# Patient Record
Sex: FEMALE | Race: WHITE | NOT HISPANIC OR LATINO | Employment: OTHER | URBAN - NONMETROPOLITAN AREA
[De-identification: names, ages, dates, MRNs, and addresses within clinical notes are randomized per-mention and may not be internally consistent; named-entity substitution may affect disease eponyms.]

---

## 2021-04-08 DIAGNOSIS — Z23 ENCOUNTER FOR IMMUNIZATION: ICD-10-CM

## 2021-08-20 ENCOUNTER — TELEPHONE (OUTPATIENT)
Dept: PAIN MEDICINE | Facility: CLINIC | Age: 71
End: 2021-08-20

## 2021-08-20 ENCOUNTER — CONSULT (OUTPATIENT)
Dept: PAIN MEDICINE | Facility: CLINIC | Age: 71
End: 2021-08-20
Payer: MEDICARE

## 2021-08-20 VITALS
HEART RATE: 89 BPM | SYSTOLIC BLOOD PRESSURE: 140 MMHG | RESPIRATION RATE: 20 BRPM | DIASTOLIC BLOOD PRESSURE: 72 MMHG | HEIGHT: 66 IN | TEMPERATURE: 97.8 F | BODY MASS INDEX: 33.91 KG/M2 | WEIGHT: 211 LBS

## 2021-08-20 DIAGNOSIS — M47.816 LUMBAR FACET ARTHROPATHY: Primary | ICD-10-CM

## 2021-08-20 PROCEDURE — 99204 OFFICE O/P NEW MOD 45 MIN: CPT | Performed by: ANESTHESIOLOGY

## 2021-08-20 RX ORDER — AMLODIPINE BESYLATE 5 MG/1
5 TABLET ORAL DAILY
COMMUNITY
Start: 2021-06-18

## 2021-08-20 RX ORDER — METHYLPREDNISOLONE ACETATE 80 MG/ML
80 INJECTION, SUSPENSION INTRA-ARTICULAR; INTRALESIONAL; INTRAMUSCULAR; PARENTERAL; SOFT TISSUE ONCE
Status: CANCELLED | OUTPATIENT
Start: 2021-08-20 | End: 2021-08-20

## 2021-08-20 RX ORDER — CLINDAMYCIN HYDROCHLORIDE 150 MG/1
CAPSULE ORAL
COMMUNITY
Start: 2021-06-21 | End: 2021-09-15

## 2021-08-20 RX ORDER — LIDOCAINE HYDROCHLORIDE 10 MG/ML
10 INJECTION, SOLUTION EPIDURAL; INFILTRATION; INTRACAUDAL; PERINEURAL ONCE
Status: CANCELLED | OUTPATIENT
Start: 2021-08-20 | End: 2021-08-20

## 2021-08-20 RX ORDER — BUPIVACAINE HCL/PF 2.5 MG/ML
10 VIAL (ML) INJECTION ONCE
Status: CANCELLED | OUTPATIENT
Start: 2021-08-20 | End: 2021-08-20

## 2021-08-20 RX ORDER — ASPIRIN 81 MG/1
81 TABLET ORAL 2 TIMES DAILY
COMMUNITY
Start: 2021-06-01 | End: 2022-06-01

## 2021-08-20 NOTE — H&P (VIEW-ONLY)
Assessment  1  Lumbar facet arthropathy  -     Case request operating room: BLOCK MEDIAL BRANCH L3, L4, L5; Standing  -     Case request operating room: BLOCK MEDIAL BRANCH L3, L4, L5    Right low axial back pain described by arthritic/ axial features  Denies any radicular component to the pain apart from some mild numbness in right L5 dermatomal distribution that is very intermittent in nature  She has positive facet loading maneuvers, right greater than left  Negative SLR bilaterally; 5/5 strength in all extremities with active range of motion movements bilaterally  Facet arthropathy that is prominent right greater than left at L3 -4 L4-5, L5-S1 facet joints,  Causing in combination with right L4-5 and L5-S1 transforaminal stenosis secondary to disc bulge, severe stenosis with exiting nerve roots at these levels  Previously underwent facet joint injections and epidural steroid injections that significant relief more than 1 year ago  Reasonable at this time to proceed with multimodal pain therapy plan as noted to treat patient's arthritic low back pain  Plan  -Right L3, L4, L5 medial branch blocks #1  -may continue advil sparingly  Patient educated regarding bleeding risk of taking this medication not taking any other nonsteroidal anti-inflammatory medications while taking this medication; counseled thoroughly regarding potential risk of Cardiovascular injury, Kidney injury, Gastrointestinal ulceration/bleeding  Patient voiced understanding  -physical therapy for right-sided lumbar facet arthropathy; Core exercises additionally provided for physician directed home PT that the patient plans to participate with for 1 hour, twice a week for the next 6 weeks  There are risks associated with opioid medications, including dependence, addiction and tolerance  The patient understands and agrees to use these medications only as prescribed   Potential side effects of the medications include, but are not limited to, constipation, drowsiness, addiction, impaired judgment and risk of fatal overdose if not taken as prescribed  The patient was warned against driving while taking sedation medications  Sharing medications is a felony  At this point in time, the patient is showing no signs of addiction, abuse, diversion or suicidal ideation  [place] Prescription Drug Monitoring Program report was reviewed and was appropriate     Complete risks and benefits including bleeding, infection, tissue reaction, nerve injury and allergic reaction were discussed  The approach was demonstrated using models and literature was provided  Verbal and written consent was obtained  My impressions and treatment recommendations were discussed in detail with the patient who verbalized understanding and had no further questions  Discharge instructions were provided  I personally saw and examined the patient and I agree with the above discussed plan of care  New Medications Ordered This Visit   Medications    amLODIPine (NORVASC) 5 mg tablet     Sig: Take 5 mg by mouth daily    aspirin (ECOTRIN LOW STRENGTH) 81 mg EC tablet     Sig: Take 81 mg by mouth 2 (two) times a day    clindamycin (CLEOCIN) 150 mg capsule     Sig: take 4 capsules by mouth 1 HOUR BEFORE DENTIST       History of Present Illness    A Nemo Duenas is a 80 y o  female with previous gastric bypass surgery, obesity presenting with a past medical history of chronic low back pain described primarily as arthritic in nature  She describes 8/10 low back pain that is worse in the mornings and worse at the end of the day  The pain is characterized by achy, nagging, indolent, crampy, stabbing pain in her axial low back  The patient describes that the pain is worse with standing for long periods of time on hard surfaces as well as with walking    The patient is a very active individual and feels as though this pain compromisesher participation with independent activities of daily living  The pain can be debilitating at times and contribute to significant disability, compromising overall activity and independent activities of daily living  She has tried physical therapy with limited relief of symptoms in the past   Medications the patient has tried in the past include tylenol, meloxicam, celebrex, alleve  She describes no radicular symptoms other than some intermittent numbness in right L5 dermatome and otherwise has good strength  The patient denies any bowel or bladder dysfunction as well  I have personally reviewed and/or updated the patient's past medical history, past surgical history, family history, social history, current medications, allergies, and vital signs today  Review of Systems   Constitutional: Positive for activity change  HENT: Negative  Eyes: Negative  Respiratory: Negative  Cardiovascular: Negative  Gastrointestinal: Negative  Endocrine: Negative  Genitourinary: Negative  Musculoskeletal: Positive for arthralgias, back pain, gait problem and myalgias  Skin: Negative  Allergic/Immunologic: Negative  Neurological: Negative for weakness and numbness  Hematological: Negative  Psychiatric/Behavioral: Negative  All other systems reviewed and are negative  There is no problem list on file for this patient  History reviewed  No pertinent past medical history  History reviewed  No pertinent surgical history      Family History   Problem Relation Age of Onset    No Known Problems Mother     No Known Problems Father        Social History     Occupational History    Not on file   Tobacco Use    Smoking status: Never Smoker    Smokeless tobacco: Never Used   Substance and Sexual Activity    Alcohol use: Not on file    Drug use: Not on file    Sexual activity: Not on file       Current Outpatient Medications on File Prior to Visit   Medication Sig    amLODIPine (NORVASC) 5 mg tablet Take 5 mg by mouth daily    aspirin (ECOTRIN LOW STRENGTH) 81 mg EC tablet Take 81 mg by mouth 2 (two) times a day    clindamycin (CLEOCIN) 150 mg capsule take 4 capsules by mouth 1 HOUR BEFORE DENTIST     No current facility-administered medications on file prior to visit  Allergies   Allergen Reactions    Amoxicillin Abdominal Pain and Other (See Comments)    Azithromycin Abdominal Pain and Other (See Comments)         Physical Exam    /72   Pulse 89   Temp 97 8 °F (36 6 °C)   Resp 20   Ht 5' 6 25" (1 683 m)   Wt 95 7 kg (211 lb)   BMI 33 80 kg/m²     Constitutional: normal, well developed, well nourished, alert, in no distress and non-toxic and no overt pain behavior  and obese  Eyes: anicteric  HEENT: grossly intact  Neck: supple, symmetric, trachea midline and no masses   Pulmonary:even and unlabored  Cardiovascular:No edema or pitting edema present  Skin:Normal without rashes or lesions and well hydrated  Psychiatric:Mood and affect appropriate  Neurologic:Cranial Nerves II-XII grossly intact Sensation grossly intact; no clonus negative sanchez's  Reflexes 2+ and brisk  SLR negative bilaterally  Musculoskeletal:normal gait  5/5 strength in all extremities with active range of motion movements bilaterally  Normal heel toe and tip toe walking  pain with lumbar facet loading bilaterally and with lateral spine rotation  ttp over lumbar paraspinal muscles  Negative toby's test, negative gaenslen's negative SIJ loading bilaterally  Imaging      MRI lumbar spine shows no central canal stenosis; however, right L4-5 and L5-S1 transforaminal stenosis secondary to disc herniations  Additionally there is prominent right greater than left facet arthropathy that is moderate to severe throughout axial lumbar spine

## 2021-08-20 NOTE — TELEPHONE ENCOUNTER
In room with [facility] NP for vaginal exam.     Debo Snider RN  01/31/20 9043 S/w pt and scheduled her procedure with Dr Uriel Benz  Went over pre procedure instructions as well  No eating/drinking 1 hour prior  Pt aware she will be called the day before with a time  Appropriate clothing   required  Take medications as usual - hold prn pain meds 6 hrs before  If you get sick or abx, call  Pt verbalized understanding

## 2021-08-20 NOTE — PATIENT INSTRUCTIONS
Core Strengthening Exercises   WHAT YOU NEED TO KNOW:   Your core includes the muscles of your lower back, hip, pelvis, and abdomen  Core strengthening exercises help heal and strengthen these muscles  This helps prevent another injury, and keeps your pelvis, spine, and hips in the correct position  DISCHARGE INSTRUCTIONS:   Contact your healthcare provider if:   · You have sharp or worsening pain during exercise or at rest     · You have questions or concerns about your shoulder exercises  Safety tips:  Talk to your healthcare provider before you start an exercise program  A physical therapist can teach you how to do core strengthening exercises safely  · Do the exercises on a mat or firm surface  A firm surface will support your spine and prevent low back pain  Do not do these exercises on a bed  · Move slowly and smoothly  Avoid fast or jerky motions  · Stop if you feel pain  Core exercises should not be painful  Stop if you feel pain  · Breathe normally during core exercises  Do not hold your breath  This may cause an increase in blood pressure and prevent muscle strengthening  Your healthcare provider will tell you when to inhale and exhale during the exercise  · Begin all of your exercises with abdominal bracing  Abdominal bracing helps warm up your core muscles  You can also practice abdominal bracing throughout the day  Lie on your back with your knees bent and feet flat on the floor  Place your arms in a relaxed position beside your body  Tighten your abdominal muscles  Pull your belly button in and up toward your spine  Hold for 5 seconds  Relax your muscles  Repeat 10 times  Core strengthening exercises: Your healthcare provider will tell you how often to do these exercises  The provider will also tell you how many repetitions of each exercise you should do  Hold each exercise for 5 seconds or as directed  As you get stronger, increase your hold to 10 to 15 seconds   You can do some of these exercises on a stability ball, or with a weight  Ask your healthcare provider how to use a stability ball or weight for these exercises:  · Bridging:  Lie on your back with your knees bent and feet flat on the floor  Rest your arms at your side  Tighten your buttocks, and then lift your hips 1 inch off the floor  Hold for 5 seconds  When you can do this exercise without pain for 10 seconds, increase the distance you lift your hips  A good goal is to be able to lift your hips so that your shoulders, hips, and knees are in a straight line  · Dead bug:  Lie on your back with your knees bent and feet flat on the floor  Place your arms in a relaxed position beside your body  Begin with abdominal bracing  Next, raise one leg, keeping your knee bent  Hold for 5 seconds  Repeat with the other leg  When you can do this exercise without pain for 10 to 15 seconds, you may raise one straight leg and hold  Repeat with the other leg  · Quadruped:  Place your hands and knees on the floor  Keep your wrists directly below your shoulders and your knees directly below your hips  Pull your belly button in toward your spine  Do not flatten or arch your back  Tighten your abdominal muscles below your belly button  Hold for 5 seconds  When you can do this exercise without pain for 10 to 15 seconds, you may extend one arm and hold  Repeat on the other side  · Side bridge exercises:      ? Standing side bridge:  Stand next to a wall and extend one arm toward the wall  Place your palm flat on the wall with your fingers pointing upward  Begin with abdominal bracing  Next, without moving your feet, slowly bend your arm to 90 degrees  Hold for 5 seconds  Repeat on the other side  When you can do this exercise without pain for 10 to 15 seconds, you may do the bent leg side bridge on the floor  ? Bent leg side bridge:  Lie on one side with your legs, hips, and shoulders in a straight line   Prop yourself up onto your forearm so your elbow is directly below your shoulder  Bend your knees back to 90 degrees  Begin with abdominal bracing  Next, lift your hips and balance yourself on your forearm and knees  Hold for 5 seconds  Repeat on the other side  When you can do this exercise without pain for 10 to 15 seconds, you may do the straight leg side bridge on the floor  ? Straight leg side bridge:  Lie on one side with your legs, hips, and shoulders in a straight line  Prop yourself up onto your forearm so your elbow is directly below your shoulder  Begin with abdominal bracing  Lift your hips off the floor and balance yourself on your forearm and the outside of your flexed foot  Do not let your ankle bend sideways  Hold for 5 seconds  Repeat on the other side  When you can do this exercise without pain for 10 to 15 seconds, ask your healthcare provider for more advanced exercises  · Superman:  Lie on your stomach  Extend your arms forward on the floor  Tighten your abdominal muscles and lift your right hand and left leg off the floor  Hold this position  Slowly return to the starting position  Tighten your abdominal muscles and lift your left hand and right leg off the floor  Hold this position  Slowly return to the starting position  · Clam:  Lie on your side with your knees bent  Put your bottom arm under your head to keep your neck in line  Put your top hand on your hip to keep your pelvis from moving  Put your heels together, and keep them together during this exercise  Slowly raise your top knee toward the ceiling  Then lower your leg so your knees are together  Repeat this exercise 10 times  Then switch sides and do the exercise 10 times with the other leg  · Curl up:  Lie on your back with your knees bent and feet flat on the floor  Place your hands, palms down, underneath your lower back   Next, with your elbows on the floor, lift your shoulders and chest 2 to 3 inches off the floor  Keep your head in line with your shoulders  Hold this position  Slowly return to the starting position  · Straight leg raises:  Lie on your back with one leg straight  Bend the other knee and place your foot flat on the floor  Tighten your abdominal muscles  Keep your leg straight and slowly lift it straight up 6 to 12 inches off the floor  Hold this position  Lower your leg slowly  Do as many repetitions as directed on this side  Repeat with the other leg  · Plank:  Lie on your stomach  Bend your elbows and place your forearms flat on the floor  Lift your chest, stomach, and knees off the floor  Make sure your elbows are below your shoulders  Your body should be in a straight line  Do not let your hips or lower back sink to the ground  Squeeze your abdominal muscles together and hold for 15 seconds  To make this exercise harder, hold for 30 seconds or lift 1 leg at a time  · Bicycles:  Lie on your back  Bend both knees and bring them toward your chest  Your calves should be parallel to the floor  Place the palms of your hands on the back of your head  Straighten your right leg and keep it lifted 2 inches off the floor  Raise your head and shoulders off the floor and twist towards your left  Keep your head and shoulders lifted  Bend your right knee while you straighten your left leg  Keep your left leg 2 inches off the floor  Twist your head and chest towards the left leg  Continue to straighten 1 leg at a time and twist        Follow up with your healthcare provider as directed:  Write down your questions so you remember to ask them during your visits  © Copyright [employer] 2021 Information is for End User's use only and may not be sold, redistributed or otherwise used for commercial purposes  All illustrations and images included in CareNotes® are the copyrighted property of A D A M , Inc  or [facility]   The above information is an  only   It is not intended as medical advice for individual conditions or treatments  Talk to your doctor, nurse or pharmacist before following any medical regimen to see if it is safe and effective for you  Lumbar Facet Block   WHAT YOU NEED TO KNOW:   A lumbar facet block is a procedure used to decrease inflammation in your lower spine  Medicines are injected at facet joints in your lower back  Facet joints are found at the back of each vertebrae  HOW TO PREPARE:   The week before your procedure:   · Your healthcare provider will talk to you about how to prepare for your procedure  Arrange to have someone drive you home after the procedure  · Tell your provider about all the medicines you currently take  He or she will tell you if you need to stop any medicine before the procedure, and when to stop  He or she will tell you what medicines to take or not take on the day of your procedure  · You may need blood or urine tests before your procedure  You may also need x-rays, a CT scan, or an MRI  Tell your healthcare provider if you have ever had an allergic reaction to contrast liquid  Do not enter the MRI room with anything metal  Metal can cause serious damage  Tell the provider if you have any metal in or on your body  The night before your procedure: You may be told not to eat or drink anything after midnight  The day of your procedure:   · Take only the medicines your healthcare provider told you to take  · You or a close family member will be asked to sign a legal document called a consent form  It gives healthcare providers permission to do the procedure or surgery  It also explains the problems that may happen, and your choices  Make sure all your questions are answered before you sign this form  · Healthcare providers may insert an intravenous tube (IV) into your vein  A vein in the arm is usually chosen  Through the IV tube, you may be given liquids and medicine      · An anesthesiologist will talk to you before your surgery  You may need medicine to keep you asleep or numb an area of your body during surgery  Tell healthcare providers if you or anyone in your family has had a problem with anesthesia in the past     WHAT WILL HAPPEN:   What will happen:   · You will lie on your stomach, with your body slightly turned to the side  A pillow may be placed under your abdomen, or you may be asked to bend one or both knees  · A needle will be inserted into the facet joint in your lower back  Your surgeon may use contrast liquid with an x-ray or CT to help guide the needle  He or she will inject medicines, such as steroids, to decrease inflammation  After your procedure: You will be taken to a room to rest until you are fully awake  You will be monitored closely for any problems  Do not get out of bed until your healthcare provider says it is okay  Your provider may have you move the area to see if you still have pain  You may then be able to go home  CONTACT YOUR HEALTHCARE PROVIDER IF:   · You have a fever, a cold, or the flu  · You have questions or concerns about your procedure  RISKS:   You may bleed more than expected or get an infection  Nerves, blood vessels, or muscles may be damaged  You may have numbness in other areas  You may still have lower back or leg pain  CARE AGREEMENT:   You have the right to help plan your care  Learn about your health condition and how it may be treated  Discuss treatment options with your healthcare providers to decide what care you want to receive  You always have the right to refuse treatment  © Copyright [employer] 2021 Information is for End User's use only and may not be sold, redistributed or otherwise used for commercial purposes  All illustrations and images included in CareNotes® are the copyrighted property of [employer]  or Martín Perdue  The above information is an  only   It is not intended as medical advice for individual conditions or treatments  Talk to your doctor, nurse or pharmacist before following any medical regimen to see if it is safe and effective for you  Lumbar Radiofrequency Ablation   WHAT YOU NEED TO KNOW:   What do I need to know about lumbar radiofrequency ablation? Lumbar radiofrequency ablation (RFA) is a procedure used to treat facet joint pain in your lower back  Facet joints are found at the back of each vertebra  A needle electrode is used to send electrical currents to the nerves in your facet joint  The electrical currents create heat that damages the nerve so it cannot send pain signals  How do I prepare for lumbar RFA? Your healthcare provider will talk to you about how to prepare for this procedure  He may tell you not to eat or drink anything after midnight on the day of your procedure  He will tell you what medicines to take or not take on the day of your procedure  What will happen during lumbar RFA? · You will lie on your stomach  You will be given local anesthesia to numb the area of your back where the needle electrode will be inserted  You may be given a sedative to help keep you relaxed  You may still feel pressure or pushing during the procedure, but you should not feel any pain  Your healthcare provider will use fluoroscopy (a type of x-ray) to guide the needle electrode to the nerves near your facet joint  · Your healthcare provider may touch the affected nerve to make sure the needle electrode is in the right place  You will feel tingling or pressure when he does this  He will then apply local anesthesia to the nerve to numb it  This will prevent you from feeling pain when he applies heat to the nerve  Your healthcare provider will then apply heat to the nerve using the needle electrode  He may need to apply heat to more than one nerve  He will remove the needle electrode and apply a bandage over the area  What are the risks of lumbar RFA?   You may have pain, numbness, tingling, or burning in the area where the lumbar RFA was done  These normally go away within 6 weeks  The needle electrode may injure your spinal nerves  This may cause permanent leg weakness or nerve pain  CARE AGREEMENT:   You have the right to help plan your care  Learn about your health condition and how it may be treated  Discuss treatment options with your healthcare providers to decide what care you want to receive  You always have the right to refuse treatment  The above information is an  only  It is not intended as medical advice for individual conditions or treatments  Talk to your doctor, nurse or pharmacist before following any medical regimen to see if it is safe and effective for you  © Copyright [employer] 2021 Information is for End User's use only and may not be sold, redistributed or otherwise used for commercial purposes   All illustrations and images included in CareNotes® are the copyrighted property of [employer]  or [address] [place] [address] [employer]

## 2021-08-20 NOTE — PROGRESS NOTES
Assessment  1  Lumbar facet arthropathy  -     Case request operating room: BLOCK MEDIAL BRANCH L3, L4, L5; Standing  -     Case request operating room: BLOCK MEDIAL BRANCH L3, L4, L5    Right low axial back pain described by arthritic/ axial features  Denies any radicular component to the pain apart from some mild numbness in right L5 dermatomal distribution that is very intermittent in nature  She has positive facet loading maneuvers, right greater than left  Negative SLR bilaterally; 5/5 strength in all extremities with active range of motion movements bilaterally  Facet arthropathy that is prominent right greater than left at L3 -4 L4-5, L5-S1 facet joints,  Causing in combination with right L4-5 and L5-S1 transforaminal stenosis secondary to disc bulge, severe stenosis with exiting nerve roots at these levels  Previously underwent facet joint injections and epidural steroid injections that significant relief more than 1 year ago  Reasonable at this time to proceed with multimodal pain therapy plan as noted to treat patient's arthritic low back pain  Plan  -Right L3, L4, L5 medial branch blocks #1  -may continue advil sparingly  Patient educated regarding bleeding risk of taking this medication not taking any other nonsteroidal anti-inflammatory medications while taking this medication; counseled thoroughly regarding potential risk of Cardiovascular injury, Kidney injury, Gastrointestinal ulceration/bleeding  Patient voiced understanding  -physical therapy for right-sided lumbar facet arthropathy; Core exercises additionally provided for physician directed home PT that the patient plans to participate with for 1 hour, twice a week for the next 6 weeks  There are risks associated with opioid medications, including dependence, addiction and tolerance  The patient understands and agrees to use these medications only as prescribed   Potential side effects of the medications include, but are not limited to, constipation, drowsiness, addiction, impaired judgment and risk of fatal overdose if not taken as prescribed  The patient was warned against driving while taking sedation medications  Sharing medications is a felony  At this point in time, the patient is showing no signs of addiction, abuse, diversion or suicidal ideation  [place] Prescription Drug Monitoring Program report was reviewed and was appropriate     Complete risks and benefits including bleeding, infection, tissue reaction, nerve injury and allergic reaction were discussed  The approach was demonstrated using models and literature was provided  Verbal and written consent was obtained  My impressions and treatment recommendations were discussed in detail with the patient who verbalized understanding and had no further questions  Discharge instructions were provided  I personally saw and examined the patient and I agree with the above discussed plan of care  New Medications Ordered This Visit   Medications    amLODIPine (NORVASC) 5 mg tablet     Sig: Take 5 mg by mouth daily    aspirin (ECOTRIN LOW STRENGTH) 81 mg EC tablet     Sig: Take 81 mg by mouth 2 (two) times a day    clindamycin (CLEOCIN) 150 mg capsule     Sig: take 4 capsules by mouth 1 HOUR BEFORE DENTIST       History of Present Illness    A Wendy Apley is a 80 y o  female with previous gastric bypass surgery, obesity presenting with a past medical history of chronic low back pain described primarily as arthritic in nature  She describes 8/10 low back pain that is worse in the mornings and worse at the end of the day  The pain is characterized by achy, nagging, indolent, crampy, stabbing pain in her axial low back  The patient describes that the pain is worse with standing for long periods of time on hard surfaces as well as with walking    The patient is a very active individual and feels as though this pain compromisesher participation with independent activities of daily living  The pain can be debilitating at times and contribute to significant disability, compromising overall activity and independent activities of daily living  She has tried physical therapy with limited relief of symptoms in the past   Medications the patient has tried in the past include tylenol, meloxicam, celebrex, alleve  She describes no radicular symptoms other than some intermittent numbness in right L5 dermatome and otherwise has good strength  The patient denies any bowel or bladder dysfunction as well  I have personally reviewed and/or updated the patient's past medical history, past surgical history, family history, social history, current medications, allergies, and vital signs today  Review of Systems   Constitutional: Positive for activity change  HENT: Negative  Eyes: Negative  Respiratory: Negative  Cardiovascular: Negative  Gastrointestinal: Negative  Endocrine: Negative  Genitourinary: Negative  Musculoskeletal: Positive for arthralgias, back pain, gait problem and myalgias  Skin: Negative  Allergic/Immunologic: Negative  Neurological: Negative for weakness and numbness  Hematological: Negative  Psychiatric/Behavioral: Negative  All other systems reviewed and are negative  There is no problem list on file for this patient  History reviewed  No pertinent past medical history  History reviewed  No pertinent surgical history      Family History   Problem Relation Age of Onset    No Known Problems Mother     No Known Problems Father        Social History     Occupational History    Not on file   Tobacco Use    Smoking status: Never Smoker    Smokeless tobacco: Never Used   Substance and Sexual Activity    Alcohol use: Not on file    Drug use: Not on file    Sexual activity: Not on file       Current Outpatient Medications on File Prior to Visit   Medication Sig    amLODIPine (NORVASC) 5 mg tablet Take 5 mg by mouth daily    aspirin (ECOTRIN LOW STRENGTH) 81 mg EC tablet Take 81 mg by mouth 2 (two) times a day    clindamycin (CLEOCIN) 150 mg capsule take 4 capsules by mouth 1 HOUR BEFORE DENTIST     No current facility-administered medications on file prior to visit  Allergies   Allergen Reactions    Amoxicillin Abdominal Pain and Other (See Comments)    Azithromycin Abdominal Pain and Other (See Comments)         Physical Exam    /72   Pulse 89   Temp 97 8 °F (36 6 °C)   Resp 20   Ht 5' 6 25" (1 683 m)   Wt 95 7 kg (211 lb)   BMI 33 80 kg/m²     Constitutional: normal, well developed, well nourished, alert, in no distress and non-toxic and no overt pain behavior  and obese  Eyes: anicteric  HEENT: grossly intact  Neck: supple, symmetric, trachea midline and no masses   Pulmonary:even and unlabored  Cardiovascular:No edema or pitting edema present  Skin:Normal without rashes or lesions and well hydrated  Psychiatric:Mood and affect appropriate  Neurologic:Cranial Nerves II-XII grossly intact Sensation grossly intact; no clonus negative sanchez's  Reflexes 2+ and brisk  SLR negative bilaterally  Musculoskeletal:normal gait  5/5 strength in all extremities with active range of motion movements bilaterally  Normal heel toe and tip toe walking  pain with lumbar facet loading bilaterally and with lateral spine rotation  ttp over lumbar paraspinal muscles  Negative toby's test, negative gaenslen's negative SIJ loading bilaterally  Imaging      MRI lumbar spine shows no central canal stenosis; however, right L4-5 and L5-S1 transforaminal stenosis secondary to disc herniations  Additionally there is prominent right greater than left facet arthropathy that is moderate to severe throughout axial lumbar spine

## 2021-08-24 ENCOUNTER — TELEPHONE (OUTPATIENT)
Dept: PAIN MEDICINE | Facility: CLINIC | Age: 71
End: 2021-08-24

## 2021-08-24 RX ORDER — LEVOTHYROXINE SODIUM 0.1 MG/1
100 TABLET ORAL DAILY
COMMUNITY

## 2021-08-24 RX ORDER — PRAVASTATIN SODIUM 20 MG
20 TABLET ORAL DAILY
COMMUNITY

## 2021-08-24 RX ORDER — MULTIVITAMIN
1 CAPSULE ORAL DAILY
COMMUNITY

## 2021-08-24 RX ORDER — FAMOTIDINE 40 MG/1
40 TABLET, FILM COATED ORAL
COMMUNITY
End: 2021-09-15

## 2021-08-24 RX ORDER — ERGOCALCIFEROL 1.25 MG/1
50000 CAPSULE ORAL
COMMUNITY

## 2021-08-24 RX ORDER — PANTOPRAZOLE SODIUM 40 MG/1
40 TABLET, DELAYED RELEASE ORAL DAILY
COMMUNITY

## 2021-08-24 RX ORDER — LORATADINE 10 MG/1
10 TABLET ORAL DAILY
COMMUNITY

## 2021-08-24 NOTE — TELEPHONE ENCOUNTER
Patient is asking if there is a different procedure that can be done to target the area of pain instead of the 2 diagnostic procedures and then Ablation  Please advise

## 2021-08-26 ENCOUNTER — APPOINTMENT (OUTPATIENT)
Dept: RADIOLOGY | Facility: HOSPITAL | Age: 71
End: 2021-08-26
Payer: MEDICARE

## 2021-08-26 ENCOUNTER — HOSPITAL ENCOUNTER (OUTPATIENT)
Facility: HOSPITAL | Age: 71
Setting detail: OUTPATIENT SURGERY
Discharge: HOME | End: 2021-08-26
Attending: ANESTHESIOLOGY | Admitting: ANESTHESIOLOGY
Payer: MEDICARE

## 2021-08-26 VITALS
DIASTOLIC BLOOD PRESSURE: 82 MMHG | RESPIRATION RATE: 16 BRPM | TEMPERATURE: 98.2 F | SYSTOLIC BLOOD PRESSURE: 155 MMHG | HEART RATE: 89 BPM | OXYGEN SATURATION: 96 %

## 2021-08-26 PROCEDURE — 64493 INJ PARAVERT F JNT L/S 1 LEV: CPT | Performed by: ANESTHESIOLOGY

## 2021-08-26 PROCEDURE — 64494 INJ PARAVERT F JNT L/S 2 LEV: CPT | Performed by: ANESTHESIOLOGY

## 2021-08-26 PROCEDURE — 76000 FLUOROSCOPY <1 HR PHYS/QHP: CPT

## 2021-08-26 RX ORDER — METHYLPREDNISOLONE ACETATE 80 MG/ML
80 INJECTION, SUSPENSION INTRA-ARTICULAR; INTRALESIONAL; INTRAMUSCULAR; PARENTERAL; SOFT TISSUE ONCE
Status: COMPLETED | OUTPATIENT
Start: 2021-08-26 | End: 2021-08-26

## 2021-08-26 RX ORDER — BUPIVACAINE HCL/PF 2.5 MG/ML
10 VIAL (ML) INJECTION ONCE
Status: COMPLETED | OUTPATIENT
Start: 2021-08-26 | End: 2021-08-26

## 2021-08-26 RX ORDER — LIDOCAINE HYDROCHLORIDE 10 MG/ML
10 INJECTION, SOLUTION EPIDURAL; INFILTRATION; INTRACAUDAL; PERINEURAL ONCE
Status: COMPLETED | OUTPATIENT
Start: 2021-08-26 | End: 2021-08-26

## 2021-08-26 RX ORDER — ALPRAZOLAM 0.5 MG/1
0.5 TABLET ORAL ONCE
Status: DISCONTINUED | OUTPATIENT
Start: 2021-08-26 | End: 2021-08-26 | Stop reason: HOSPADM

## 2021-08-26 NOTE — INTERVAL H&P NOTE
H&P reviewed  After examining the patient I find no changes in the patients condition since the H&P had been written      Vitals:    08/26/21 1032   BP: 137/66   Pulse: 96   Resp: 18   Temp: 98 1 °F (36 7 °C)   SpO2: 95%

## 2021-08-26 NOTE — DISCHARGE INSTRUCTIONS
PLEASE SCHEDULE 2 WEEK FOLLOW UP BY CALLING THE [facility] AT [place]: [phone]      MEDIAL BRANCH BLOCK DISCHARGE INSTRUCTIONS      ACTIVITY  · Please do activities that will bring the normal pain that we are rating  For example, if vacuuming or walking increases the painm do that  Ilan twill give the most accurate response to the diary  · You may shower, but no tub baths today, or applied heat  CARE OF THE INJECTION SITE  · This area may be numb for several hours after the injection  · Notify the [facility] if you have any of the following: redness, drainage, swelling or fever above 100°F     SPECIAL INSTRUCTIONS  · Please return the MBB diary to our office by mail, fax, or drop it off  MEDICATIONS  · Please do not take any break through or short acting pain medications for 8 hours after the block  · Continue to take all routine medications  · Our office may have instructed you to hold some medications  · You may resume _______________________________________________  If you have any problems specifically related to your procedure, please call our office at [phone]  Problems not related to your procedure should be directed at your primary care physician  Lumbar Radiofrequency Ablation   WHAT YOU NEED TO KNOW:   Lumbar radiofrequency ablation (RFA) is a procedure used to treat facet joint pain in your lower back  Facet joints are found at the back of each vertebra  A needle electrode is used to send electrical currents to the nerves in your facet joint  The electrical currents create heat that damages the nerve so it cannot send pain signals  DISCHARGE INSTRUCTIONS:   Seek care immediately if:   · You cannot move your leg  · You cannot control your urine or bowel movements  · You have severe pain in your lower back  Contact your healthcare provider if:   · You have leg weakness       · You develop new symptoms  · You have questions or concerns about your condition or care  Medicines:   · Pain medicine  may be given  Ask how to take this medicine safely  · Take your medicine as directed  Contact your healthcare provider if you think your medicine is not helping or if you have side effects  Tell him or her if you are allergic to any medicine  Keep a list of the medicines, vitamins, and herbs you take  Include the amounts, and when and why you take them  Bring the list or the pill bottles to follow-up visits  Carry your medicine list with you in case of an emergency  Follow up with your healthcare provider as directed:  Write down your questions so you remember to ask them during your visits  Activity:  Do not drive a car or operate machinery within 24 hours after your procedure  Ask your healthcare provider about any other activities you should avoid  © Copyright [employer] 2021 Information is for End User's use only and may not be sold, redistributed or otherwise used for commercial purposes  All illustrations and images included in CareNotes® are the copyrighted property of A D [employer]  or [facility] [employer]   The above information is an  only  It is not intended as medical advice for individual conditions or treatments  Talk to your doctor, nurse or pharmacist before following any medical regimen to see if it is safe and effective for you

## 2021-08-26 NOTE — PROCEDURES
Pre-procedure Diagnosis: Lumbar Facet Arthropathy  Post-procedure Diagnosis: Lumbar Facet Arthropathy  Procedure Title(s):  [RIGHT L3, L4, L5] medial branch nerve blocks [(DIAGNOSTIC)]  Attending Surgeon:   Walter Walker MD  Anesthesia:   Local     Indications: The patient is a 80y o  year-old female with a diagnosis of lumbar facet arthropathy  The patient's history and physical exam were reviewed  The risks, benefits and alternatives to the procedure were discussed, and all questions were answered to the patient's satisfaction  The patient agreed to proceed, and written informed consent was obtained  Procedure in Detail: The patient was brought into the procedure room and placed in the prone position on the fluoroscopy table  The area of the lumbar spine was prepped with chloraprep and then draped in a sterile manner  AP fluoroscopy was used to identify the [L3-L5] levels on the [RIGHT] side  The C-arm was obliqued to visualize the junction of the superior articulate process and transverse process  The sacral ala was identified and marked  The skin in these identified areas was anesthetized with 1% lidocaine  A 22-gauge, 5-inch spinal needle was advanced toward each of these points under fluoroscopic guidance  Once bone was contacted, negative aspiration was confirmed and [1-mL] of a [4mL]mixture of [3mL] [0 25% bupivicaine] and 1mL of 80mg/mL Depomedrol was injected at each level  After the procedure was completed, the patient's back was cleaned and bandages were placed at the needle insertion sites  Disposition: The patient tolerated the procedure well, and there were no apparent complications  The patient was taken to the recovery area where written discharge instructions for the procedure were given  The patient was given a pain diary to determine if the patient's pain improves following the injection   Once the diary is returned we will consider next appropriate course of treatment      Postoperative pain relief [WAS] significant    Estimated Blood Loss: None  Specimens Obtained: N/A

## 2021-08-26 NOTE — OP NOTE
OPERATIVE REPORT  PATIENT NAME: Daina Penny    :  1950  MRN: [id number]  Pt Location:  GI ROOM 01    SURGERY DATE: 2021    Surgeon(s) and Role:      Lucien Stone MD - Primary    Preop Diagnosis:  Lumbar facet arthropathy [M47 816]    Post-Op Diagnosis Codes:     * Lumbar facet arthropathy [M47 816]    Procedure(s) (LRB):  BLOCK MEDIAL BRANCH L3, L4, L5 (Right)    Specimen(s):  * No specimens in log *    Estimated Blood Loss:   Minimal    Drains:  * No LDAs found *    Anesthesia Type:   Local    Operative Indications:  Lumbar facet arthropathy [M47 816]    Operative Findings:  Right L3, L4, L5 medial branch nerve regions identified under fluoroscopic guidance    Complications:   None    Procedure and Technique:  Please see detailed procedure note     I was present for the entire procedure    Patient Disposition:  PACU     SIGNATURE: Luis M Dewitt MD  DATE: 2021  TIME: 11:04 AM

## 2021-08-27 DIAGNOSIS — M47.816 LUMBAR FACET ARTHROPATHY: Primary | ICD-10-CM

## 2021-08-27 RX ORDER — CELECOXIB 200 MG/1
200 CAPSULE ORAL 2 TIMES DAILY
Qty: 60 CAPSULE | Refills: 0 | Status: SHIPPED | OUTPATIENT
Start: 2021-08-27 | End: 2021-09-15

## 2021-09-15 ENCOUNTER — OFFICE VISIT (OUTPATIENT)
Dept: PAIN MEDICINE | Facility: CLINIC | Age: 71
End: 2021-09-15
Payer: MEDICARE

## 2021-09-15 ENCOUNTER — TELEPHONE (OUTPATIENT)
Dept: PAIN MEDICINE | Facility: CLINIC | Age: 71
End: 2021-09-15

## 2021-09-15 VITALS
TEMPERATURE: 97.8 F | BODY MASS INDEX: 33.04 KG/M2 | RESPIRATION RATE: 20 BRPM | DIASTOLIC BLOOD PRESSURE: 88 MMHG | HEIGHT: 66 IN | HEART RATE: 96 BPM | SYSTOLIC BLOOD PRESSURE: 150 MMHG | WEIGHT: 205.6 LBS

## 2021-09-15 DIAGNOSIS — M47.816 LUMBAR FACET ARTHROPATHY: Primary | ICD-10-CM

## 2021-09-15 PROCEDURE — 99214 OFFICE O/P EST MOD 30 MIN: CPT | Performed by: ANESTHESIOLOGY

## 2021-09-15 RX ORDER — METHYLPREDNISOLONE ACETATE 80 MG/ML
80 INJECTION, SUSPENSION INTRA-ARTICULAR; INTRALESIONAL; INTRAMUSCULAR; PARENTERAL; SOFT TISSUE ONCE
Status: CANCELLED | OUTPATIENT
Start: 2021-09-15 | End: 2021-09-15

## 2021-09-15 RX ORDER — OMEPRAZOLE 20 MG/1
20 CAPSULE, DELAYED RELEASE ORAL
COMMUNITY
End: 2021-09-15

## 2021-09-15 RX ORDER — BUPIVACAINE HCL/PF 2.5 MG/ML
10 VIAL (ML) INJECTION ONCE
Status: CANCELLED | OUTPATIENT
Start: 2021-09-15 | End: 2021-09-15

## 2021-09-15 RX ORDER — LIDOCAINE HYDROCHLORIDE 10 MG/ML
10 INJECTION, SOLUTION EPIDURAL; INFILTRATION; INTRACAUDAL; PERINEURAL ONCE
Status: CANCELLED | OUTPATIENT
Start: 2021-09-15 | End: 2021-09-15

## 2021-09-15 RX ORDER — NAPROXEN 500 MG/1
500 TABLET ORAL 2 TIMES DAILY WITH MEALS
Qty: 60 TABLET | Refills: 2 | Status: SHIPPED | OUTPATIENT
Start: 2021-09-15

## 2021-09-15 NOTE — H&P (VIEW-ONLY)
Assessment  1  Lumbar facet arthropathy  -     naproxen (NAPROSYN) 500 mg tablet; Take 1 tablet (500 mg total) by mouth 2 (two) times a day with meals  -     Case request operating room: BLOCK MEDIAL BRANCH Right L3, L4, L5 #2; Standing  -     Case request operating room: BLOCK MEDIAL BRANCH Right L3, L4, L5 #2    Greater than 90% relief of pain with improved ability to participate with IADLs after right L3, L4, L5 medial branch nerve blocks #1 for 2 days  Previously reported the following symptomatology:     Right low axial back pain described by arthritic/ axial features  Denies any radicular component to the pain apart from some mild numbness in right L5 dermatomal distribution that is very intermittent in nature  She has positive facet loading maneuvers, right greater than left  Negative SLR bilaterally; 5/5 strength in all extremities with active range of motion movements bilaterally  Facet arthropathy that is prominent right greater than left at L3 -4 L4-5, L5-S1 facet joints,  Causing in combination with right L4-5 and L5-S1 transforaminal stenosis secondary to disc bulge, severe stenosis with exiting nerve roots at these levels  Previously underwent facet joint injections and epidural steroid injections that significant relief more than 1 year ago  Reasonable at this time to proceed with multimodal pain therapy plan as noted to treat patient's arthritic low back pain  Plan  -Right L3, L4, L5 medial branch blocks #2  -discontinued advil; rx naproxen 500mg BID prn pain  Patient educated regarding bleeding risk of taking this medication not taking any other nonsteroidal anti-inflammatory medications while taking this medication; counseled thoroughly regarding potential risk of Cardiovascular injury, Kidney injury, Gastrointestinal ulceration/bleeding  Patient voiced understanding   If she notes elevation of BP, will consult with pcp to better manage   -physical therapy for right-sided lumbar facet arthropathy; Core exercises additionally provided for physician directed home PT that the patient plans to participate with for 1 hour, twice a week for the next 6 weeks  There are risks associated with opioid medications, including dependence, addiction and tolerance  The patient understands and agrees to use these medications only as prescribed  Potential side effects of the medications include, but are not limited to, constipation, drowsiness, addiction, impaired judgment and risk of fatal overdose if not taken as prescribed  The patient was warned against driving while taking sedation medications  Sharing medications is a felony  At this point in time, the patient is showing no signs of addiction, abuse, diversion or suicidal ideation  [place] Prescription Drug Monitoring Program report was reviewed and was appropriate     Complete risks and benefits including bleeding, infection, tissue reaction, nerve injury and allergic reaction were discussed  The approach was demonstrated using models and literature was provided  Verbal and written consent was obtained  My impressions and treatment recommendations were discussed in detail with the patient who verbalized understanding and had no further questions  Discharge instructions were provided  I personally saw and examined the patient and I agree with the above discussed plan of care  New Medications Ordered This Visit   Medications    CALCIUM-PHOSPHORUS PO     Sig: Take by mouth daily    Multiple Vitamin (MULTIVITAMIN PO)     Sig: Take 1 tablet by mouth daily    naproxen (NAPROSYN) 500 mg tablet     Sig: Take 1 tablet (500 mg total) by mouth 2 (two) times a day with meals     Dispense:  60 tablet     Refill:  2       History of Present Illness    Greater than 90% relief of pain with improved ability to participate with IADLs after right L3, L4, L5 medial branch nerve blocks #1 for 2 days   Previously reported the following symptomatology:     A Aster Pelletier is a 80 y o  female with previous gastric bypass surgery, obesity presenting with a past medical history of chronic low back pain described primarily as arthritic in nature  She describes 8/10 low back pain that is worse in the mornings and worse at the end of the day  The pain is characterized by achy, nagging, indolent, crampy, stabbing pain in her axial low back  The patient describes that the pain is worse with standing for long periods of time on hard surfaces as well as with walking  The patient is a very active individual and feels as though this pain compromisesher participation with independent activities of daily living  The pain can be debilitating at times and contribute to significant disability, compromising overall activity and independent activities of daily living  She has tried physical therapy with limited relief of symptoms in the past   Medications the patient has tried in the past include tylenol, meloxicam, celebrex, alleve  She describes no radicular symptoms other than some intermittent numbness in right L5 dermatome and otherwise has good strength  The patient denies any bowel or bladder dysfunction as well  I have personally reviewed and/or updated the patient's past medical history, past surgical history, family history, social history, current medications, allergies, and vital signs today  Review of Systems   Constitutional: Positive for activity change  HENT: Negative  Eyes: Negative  Respiratory: Negative  Cardiovascular: Negative  Gastrointestinal: Negative  Endocrine: Negative  Genitourinary: Negative  Musculoskeletal: Positive for arthralgias, back pain, gait problem and myalgias  Skin: Negative  Allergic/Immunologic: Negative  Neurological: Negative for weakness and numbness  Hematological: Negative  Psychiatric/Behavioral: Negative  All other systems reviewed and are negative        There is no problem list on file for this patient        Past Medical History:   Diagnosis Date    Chronic pain disorder     Disease of thyroid gland     GERD (gastroesophageal reflux disease)     Hyperlipidemia     Hypertension     Seasonal allergies        Past Surgical History:   Procedure Laterality Date    BACK SURGERY  2019    laminectomy    COLONOSCOPY      JOINT REPLACEMENT Right     RTHR    NERVE BLOCK Right 8/26/2021    Procedure: BLOCK MEDIAL BRANCH L3, L4, L5;  Surgeon: Carmela Hartman MD;  Location: [facility];  Service: Pain Management        Family History   Problem Relation Age of Onset    No Known Problems Mother     No Known Problems Father        Social History     Occupational History    Not on file   Tobacco Use    Smoking status: Never Smoker    Smokeless tobacco: Never Used   Vaping Use    Vaping Use: Never used   Substance and Sexual Activity    Alcohol use: Yes     Comment: social occasions like a wedding    Drug use: Never    Sexual activity: Not on file     Comment: did not ask       Current Outpatient Medications on File Prior to Visit   Medication Sig    amLODIPine (NORVASC) 5 mg tablet Take 5 mg by mouth daily    aspirin (ECOTRIN LOW STRENGTH) 81 mg EC tablet Take 81 mg by mouth 2 (two) times a day    BIOTIN PO Take by mouth daily    CALCIUM-PHOSPHORUS PO Take by mouth daily    Calcium-Phosphorus-Vitamin D (CALCIUM GUMMIES PO) Take by mouth    ergocalciferol (VITAMIN D2) 50,000 units Take 50,000 Units by mouth Pt takes 2 times a month    levothyroxine 100 mcg tablet Take 100 mcg by mouth daily    loratadine (CLARITIN) 10 mg tablet Take 10 mg by mouth daily    Multiple Vitamin (MULTIVITAMIN PO) Take 1 tablet by mouth daily    Multiple Vitamin (multivitamin) capsule Take 1 capsule by mouth daily    Omega-3 Fatty Acids (FISH OIL ADULT GUMMIES PO) Take by mouth    pantoprazole (PROTONIX) 40 mg tablet Take 40 mg by mouth daily    pravastatin (PRAVACHOL) 20 mg tablet Take 20 mg by mouth daily  [DISCONTINUED] celecoxib (CeleBREX) 200 mg capsule Take 1 capsule (200 mg total) by mouth 2 (two) times a day    [DISCONTINUED] CHOLECALCIFEROL PO Take 1 capsule by mouth daily    [DISCONTINUED] clindamycin (CLEOCIN) 150 mg capsule take 4 capsules by mouth 1 HOUR BEFORE DENTIST    [DISCONTINUED] famotidine (PEPCID) 40 MG tablet Take 40 mg by mouth daily at bedtime    [DISCONTINUED] omeprazole (PriLOSEC) 20 mg delayed release capsule Take 20 mg by mouth     No current facility-administered medications on file prior to visit  Allergies   Allergen Reactions    Amoxicillin Abdominal Pain and Other (See Comments)    Azithromycin Abdominal Pain and Other (See Comments)         Physical Exam    /88   Pulse 96   Temp 97 8 °F (36 6 °C)   Resp 20   Ht 5' 6 25" (1 683 m)   Wt 93 3 kg (205 lb 9 6 oz)   BMI 32 93 kg/m²     Constitutional: normal, well developed, well nourished, alert, in no distress and non-toxic and no overt pain behavior  and obese  Eyes: anicteric  HEENT: grossly intact  Neck: supple, symmetric, trachea midline and no masses   Pulmonary:even and unlabored  Cardiovascular:No edema or pitting edema present  Skin:Normal without rashes or lesions and well hydrated  Psychiatric:Mood and affect appropriate  Neurologic:Cranial Nerves II-XII grossly intact Sensation grossly intact; no clonus negative sanchez's  Reflexes 2+ and brisk  SLR negative bilaterally  Musculoskeletal:normal gait  5/5 strength in all extremities with active range of motion movements bilaterally  Normal heel toe and tip toe walking  pain with lumbar facet loading bilaterally and with lateral spine rotation  ttp over lumbar paraspinal muscles  Negative toby's test, negative gaenslen's negative SIJ loading bilaterally  Imaging      MRI lumbar spine shows no central canal stenosis; however, right L4-5 and L5-S1 transforaminal stenosis secondary to disc herniations    Additionally there is prominent right greater than left facet arthropathy that is moderate to severe throughout axial lumbar spine

## 2021-09-15 NOTE — TELEPHONE ENCOUNTER
----- Message from Adriana Bowen MD sent at 9/15/2021 12:02 PM EDT -----  [facility] no worries, can you just let her know when you call for scheduling a later date? Appreciate it  ----- Message -----  From: Nkechi Weller  Sent: 9/15/2021  11:48 AM EDT  To: Adriana Bowen MD    She can't be scheduled until 9/21  Your day is full tomorrow  ----- Message -----  From: Adriana Bowen MD  Sent: 9/15/2021  11:22 AM EDT  To: Nkechi Weller    Can we schedule her for tomorrow?  Right L3, L4, L5 medial branch nerve blocks #2

## 2021-09-15 NOTE — PATIENT INSTRUCTIONS
Core Strengthening Exercises   WHAT YOU NEED TO KNOW:   Your core includes the muscles of your lower back, hip, pelvis, and abdomen  Core strengthening exercises help heal and strengthen these muscles  This helps prevent another injury, and keeps your pelvis, spine, and hips in the correct position  DISCHARGE INSTRUCTIONS:   Contact your healthcare provider if:   · You have sharp or worsening pain during exercise or at rest     · You have questions or concerns about your shoulder exercises  Safety tips:  Talk to your healthcare provider before you start an exercise program  A physical therapist can teach you how to do core strengthening exercises safely  · Do the exercises on a mat or firm surface  A firm surface will support your spine and prevent low back pain  Do not do these exercises on a bed  · Move slowly and smoothly  Avoid fast or jerky motions  · Stop if you feel pain  Core exercises should not be painful  Stop if you feel pain  · Breathe normally during core exercises  Do not hold your breath  This may cause an increase in blood pressure and prevent muscle strengthening  Your healthcare provider will tell you when to inhale and exhale during the exercise  · Begin all of your exercises with abdominal bracing  Abdominal bracing helps warm up your core muscles  You can also practice abdominal bracing throughout the day  Lie on your back with your knees bent and feet flat on the floor  Place your arms in a relaxed position beside your body  Tighten your abdominal muscles  Pull your belly button in and up toward your spine  Hold for 5 seconds  Relax your muscles  Repeat 10 times  Core strengthening exercises: Your healthcare provider will tell you how often to do these exercises  The provider will also tell you how many repetitions of each exercise you should do  Hold each exercise for 5 seconds or as directed  As you get stronger, increase your hold to 10 to 15 seconds   You can do some of these exercises on a stability ball, or with a weight  Ask your healthcare provider how to use a stability ball or weight for these exercises:  · Bridging:  Lie on your back with your knees bent and feet flat on the floor  Rest your arms at your side  Tighten your buttocks, and then lift your hips 1 inch off the floor  Hold for 5 seconds  When you can do this exercise without pain for 10 seconds, increase the distance you lift your hips  A good goal is to be able to lift your hips so that your shoulders, hips, and knees are in a straight line  · Dead bug:  Lie on your back with your knees bent and feet flat on the floor  Place your arms in a relaxed position beside your body  Begin with abdominal bracing  Next, raise one leg, keeping your knee bent  Hold for 5 seconds  Repeat with the other leg  When you can do this exercise without pain for 10 to 15 seconds, you may raise one straight leg and hold  Repeat with the other leg  · Quadruped:  Place your hands and knees on the floor  Keep your wrists directly below your shoulders and your knees directly below your hips  Pull your belly button in toward your spine  Do not flatten or arch your back  Tighten your abdominal muscles below your belly button  Hold for 5 seconds  When you can do this exercise without pain for 10 to 15 seconds, you may extend one arm and hold  Repeat on the other side  · Side bridge exercises:      ? Standing side bridge:  Stand next to a wall and extend one arm toward the wall  Place your palm flat on the wall with your fingers pointing upward  Begin with abdominal bracing  Next, without moving your feet, slowly bend your arm to 90 degrees  Hold for 5 seconds  Repeat on the other side  When you can do this exercise without pain for 10 to 15 seconds, you may do the bent leg side bridge on the floor  ? Bent leg side bridge:  Lie on one side with your legs, hips, and shoulders in a straight line   Prop yourself up onto your forearm so your elbow is directly below your shoulder  Bend your knees back to 90 degrees  Begin with abdominal bracing  Next, lift your hips and balance yourself on your forearm and knees  Hold for 5 seconds  Repeat on the other side  When you can do this exercise without pain for 10 to 15 seconds, you may do the straight leg side bridge on the floor  ? Straight leg side bridge:  Lie on one side with your legs, hips, and shoulders in a straight line  Prop yourself up onto your forearm so your elbow is directly below your shoulder  Begin with abdominal bracing  Lift your hips off the floor and balance yourself on your forearm and the outside of your flexed foot  Do not let your ankle bend sideways  Hold for 5 seconds  Repeat on the other side  When you can do this exercise without pain for 10 to 15 seconds, ask your healthcare provider for more advanced exercises  · Superman:  Lie on your stomach  Extend your arms forward on the floor  Tighten your abdominal muscles and lift your right hand and left leg off the floor  Hold this position  Slowly return to the starting position  Tighten your abdominal muscles and lift your left hand and right leg off the floor  Hold this position  Slowly return to the starting position  · Clam:  Lie on your side with your knees bent  Put your bottom arm under your head to keep your neck in line  Put your top hand on your hip to keep your pelvis from moving  Put your heels together, and keep them together during this exercise  Slowly raise your top knee toward the ceiling  Then lower your leg so your knees are together  Repeat this exercise 10 times  Then switch sides and do the exercise 10 times with the other leg  · Curl up:  Lie on your back with your knees bent and feet flat on the floor  Place your hands, palms down, underneath your lower back   Next, with your elbows on the floor, lift your shoulders and chest 2 to 3 inches off the floor  Keep your head in line with your shoulders  Hold this position  Slowly return to the starting position  · Straight leg raises:  Lie on your back with one leg straight  Bend the other knee and place your foot flat on the floor  Tighten your abdominal muscles  Keep your leg straight and slowly lift it straight up 6 to 12 inches off the floor  Hold this position  Lower your leg slowly  Do as many repetitions as directed on this side  Repeat with the other leg  · Plank:  Lie on your stomach  Bend your elbows and place your forearms flat on the floor  Lift your chest, stomach, and knees off the floor  Make sure your elbows are below your shoulders  Your body should be in a straight line  Do not let your hips or lower back sink to the ground  Squeeze your abdominal muscles together and hold for 15 seconds  To make this exercise harder, hold for 30 seconds or lift 1 leg at a time  · Bicycles:  Lie on your back  Bend both knees and bring them toward your chest  Your calves should be parallel to the floor  Place the palms of your hands on the back of your head  Straighten your right leg and keep it lifted 2 inches off the floor  Raise your head and shoulders off the floor and twist towards your left  Keep your head and shoulders lifted  Bend your right knee while you straighten your left leg  Keep your left leg 2 inches off the floor  Twist your head and chest towards the left leg  Continue to straighten 1 leg at a time and twist        Follow up with your healthcare provider as directed:  Write down your questions so you remember to ask them during your visits  © Copyright [employer] 2021 Information is for End User's use only and may not be sold, redistributed or otherwise used for commercial purposes  All illustrations and images included in CareNotes® are the copyrighted property of [employer]  or [facility] [employer]   The above information is an  only   It is not intended as medical advice for individual conditions or treatments  Talk to your doctor, nurse or pharmacist before following any medical regimen to see if it is safe and effective for you

## 2021-09-15 NOTE — PROGRESS NOTES
Assessment  1  Lumbar facet arthropathy  -     naproxen (NAPROSYN) 500 mg tablet; Take 1 tablet (500 mg total) by mouth 2 (two) times a day with meals  -     Case request operating room: BLOCK MEDIAL BRANCH Right L3, L4, L5 #2; Standing  -     Case request operating room: BLOCK MEDIAL BRANCH Right L3, L4, L5 #2    Greater than 90% relief of pain with improved ability to participate with IADLs after right L3, L4, L5 medial branch nerve blocks #1 for 2 days  Previously reported the following symptomatology:     Right low axial back pain described by arthritic/ axial features  Denies any radicular component to the pain apart from some mild numbness in right L5 dermatomal distribution that is very intermittent in nature  She has positive facet loading maneuvers, right greater than left  Negative SLR bilaterally; 5/5 strength in all extremities with active range of motion movements bilaterally  Facet arthropathy that is prominent right greater than left at L3 -4 L4-5, L5-S1 facet joints,  Causing in combination with right L4-5 and L5-S1 transforaminal stenosis secondary to disc bulge, severe stenosis with exiting nerve roots at these levels  Previously underwent facet joint injections and epidural steroid injections that significant relief more than 1 year ago  Reasonable at this time to proceed with multimodal pain therapy plan as noted to treat patient's arthritic low back pain  Plan  -Right L3, L4, L5 medial branch blocks #2  -discontinued advil; rx naproxen 500mg BID prn pain  Patient educated regarding bleeding risk of taking this medication not taking any other nonsteroidal anti-inflammatory medications while taking this medication; counseled thoroughly regarding potential risk of Cardiovascular injury, Kidney injury, Gastrointestinal ulceration/bleeding  Patient voiced understanding   If she notes elevation of BP, will consult with pcp to better manage   -physical therapy for right-sided lumbar facet arthropathy; Core exercises additionally provided for physician directed home PT that the patient plans to participate with for 1 hour, twice a week for the next 6 weeks  There are risks associated with opioid medications, including dependence, addiction and tolerance  The patient understands and agrees to use these medications only as prescribed  Potential side effects of the medications include, but are not limited to, constipation, drowsiness, addiction, impaired judgment and risk of fatal overdose if not taken as prescribed  The patient was warned against driving while taking sedation medications  Sharing medications is a felony  At this point in time, the patient is showing no signs of addiction, abuse, diversion or suicidal ideation  [place] Prescription Drug Monitoring Program report was reviewed and was appropriate     Complete risks and benefits including bleeding, infection, tissue reaction, nerve injury and allergic reaction were discussed  The approach was demonstrated using models and literature was provided  Verbal and written consent was obtained  My impressions and treatment recommendations were discussed in detail with the patient who verbalized understanding and had no further questions  Discharge instructions were provided  I personally saw and examined the patient and I agree with the above discussed plan of care  New Medications Ordered This Visit   Medications    CALCIUM-PHOSPHORUS PO     Sig: Take by mouth daily    Multiple Vitamin (MULTIVITAMIN PO)     Sig: Take 1 tablet by mouth daily    naproxen (NAPROSYN) 500 mg tablet     Sig: Take 1 tablet (500 mg total) by mouth 2 (two) times a day with meals     Dispense:  60 tablet     Refill:  2       History of Present Illness    Greater than 90% relief of pain with improved ability to participate with IADLs after right L3, L4, L5 medial branch nerve blocks #1 for 2 days   Previously reported the following symptomatology:     A Jumana Stringer is a 80 y o  female with previous gastric bypass surgery, obesity presenting with a past medical history of chronic low back pain described primarily as arthritic in nature  She describes 8/10 low back pain that is worse in the mornings and worse at the end of the day  The pain is characterized by achy, nagging, indolent, crampy, stabbing pain in her axial low back  The patient describes that the pain is worse with standing for long periods of time on hard surfaces as well as with walking  The patient is a very active individual and feels as though this pain compromisesher participation with independent activities of daily living  The pain can be debilitating at times and contribute to significant disability, compromising overall activity and independent activities of daily living  She has tried physical therapy with limited relief of symptoms in the past   Medications the patient has tried in the past include tylenol, meloxicam, celebrex, alleve  She describes no radicular symptoms other than some intermittent numbness in right L5 dermatome and otherwise has good strength  The patient denies any bowel or bladder dysfunction as well  I have personally reviewed and/or updated the patient's past medical history, past surgical history, family history, social history, current medications, allergies, and vital signs today  Review of Systems   Constitutional: Positive for activity change  HENT: Negative  Eyes: Negative  Respiratory: Negative  Cardiovascular: Negative  Gastrointestinal: Negative  Endocrine: Negative  Genitourinary: Negative  Musculoskeletal: Positive for arthralgias, back pain, gait problem and myalgias  Skin: Negative  Allergic/Immunologic: Negative  Neurological: Negative for weakness and numbness  Hematological: Negative  Psychiatric/Behavioral: Negative  All other systems reviewed and are negative        There is no problem list on file for this patient        Past Medical History:   Diagnosis Date    Chronic pain disorder     Disease of thyroid gland     GERD (gastroesophageal reflux disease)     Hyperlipidemia     Hypertension     Seasonal allergies        Past Surgical History:   Procedure Laterality Date    BACK SURGERY  2019    laminectomy    COLONOSCOPY      JOINT REPLACEMENT Right     RTHR    NERVE BLOCK Right 8/26/2021    Procedure: BLOCK MEDIAL BRANCH L3, L4, L5;  Surgeon: Justyna Tamez MD;  [facility]: [facility];  Service: Pain Management        Family History   Problem Relation Age of Onset    No Known Problems Mother     No Known Problems Father        Social History     Occupational History    Not on file   Tobacco Use    Smoking status: Never Smoker    Smokeless tobacco: Never Used   Vaping Use    Vaping Use: Never used   Substance and Sexual Activity    Alcohol use: Yes     Comment: social occasions like a wedding    Drug use: Never    Sexual activity: Not on file     Comment: did not ask       Current Outpatient Medications on File Prior to Visit   Medication Sig    amLODIPine (NORVASC) 5 mg tablet Take 5 mg by mouth daily    aspirin (ECOTRIN LOW STRENGTH) 81 mg EC tablet Take 81 mg by mouth 2 (two) times a day    BIOTIN PO Take by mouth daily    CALCIUM-PHOSPHORUS PO Take by mouth daily    Calcium-Phosphorus-Vitamin D (CALCIUM GUMMIES PO) Take by mouth    ergocalciferol (VITAMIN D2) 50,000 units Take 50,000 Units by mouth Pt takes 2 times a month    levothyroxine 100 mcg tablet Take 100 mcg by mouth daily    loratadine (CLARITIN) 10 mg tablet Take 10 mg by mouth daily    Multiple Vitamin (MULTIVITAMIN PO) Take 1 tablet by mouth daily    Multiple Vitamin (multivitamin) capsule Take 1 capsule by mouth daily    Omega-3 Fatty Acids (FISH OIL ADULT GUMMIES PO) Take by mouth    pantoprazole (PROTONIX) 40 mg tablet Take 40 mg by mouth daily    pravastatin (PRAVACHOL) 20 mg tablet Take 20 mg by mouth daily  [DISCONTINUED] celecoxib (CeleBREX) 200 mg capsule Take 1 capsule (200 mg total) by mouth 2 (two) times a day    [DISCONTINUED] CHOLECALCIFEROL PO Take 1 capsule by mouth daily    [DISCONTINUED] clindamycin (CLEOCIN) 150 mg capsule take 4 capsules by mouth 1 HOUR BEFORE DENTIST    [DISCONTINUED] famotidine (PEPCID) 40 MG tablet Take 40 mg by mouth daily at bedtime    [DISCONTINUED] omeprazole (PriLOSEC) 20 mg delayed release capsule Take 20 mg by mouth     No current facility-administered medications on file prior to visit  Allergies   Allergen Reactions    Amoxicillin Abdominal Pain and Other (See Comments)    Azithromycin Abdominal Pain and Other (See Comments)         Physical Exam    /88   Pulse 96   Temp 97 8 °F (36 6 °C)   Resp 20   Ht 5' 6 25" (1 683 m)   Wt 93 3 kg (205 lb 9 6 oz)   BMI 32 93 kg/m²     Constitutional: normal, well developed, well nourished, alert, in no distress and non-toxic and no overt pain behavior  and obese  Eyes: anicteric  HEENT: grossly intact  Neck: supple, symmetric, trachea midline and no masses   Pulmonary:even and unlabored  Cardiovascular:No edema or pitting edema present  Skin:Normal without rashes or lesions and well hydrated  Psychiatric:Mood and affect appropriate  Neurologic:Cranial Nerves II-XII grossly intact Sensation grossly intact; no clonus negative sanchez's  Reflexes 2+ and brisk  SLR negative bilaterally  Musculoskeletal:normal gait  5/5 strength in all extremities with active range of motion movements bilaterally  Normal heel toe and tip toe walking  pain with lumbar facet loading bilaterally and with lateral spine rotation  ttp over lumbar paraspinal muscles  Negative toby's test, negative gaenslen's negative SIJ loading bilaterally  Imaging      MRI lumbar spine shows no central canal stenosis; however, right L4-5 and L5-S1 transforaminal stenosis secondary to disc herniations    Additionally there is prominent right greater than left facet arthropathy that is moderate to severe throughout axial lumbar spine

## 2021-09-16 NOTE — TELEPHONE ENCOUNTER
Pt scheduled for 2nd MBB on 9/21  Went over pre procedure instructions with her as well  Pt wants to know if she can have a virtual follow up on 10/4 instead of coming in for it because she is supposed to be in [place] with her grandchildren

## 2021-09-16 NOTE — TELEPHONE ENCOUNTER
Patient is requesting to change her procedure time frame for afternoon if possible  Procedure  states she'll put in a note for Dr Kath Peña & will receive a call the day before procedure to confirm time frame

## 2021-09-21 ENCOUNTER — HOSPITAL ENCOUNTER (OUTPATIENT)
Facility: HOSPITAL | Age: 71
Setting detail: OUTPATIENT SURGERY
Discharge: HOME | End: 2021-09-21
Attending: ANESTHESIOLOGY | Admitting: ANESTHESIOLOGY
Payer: MEDICARE

## 2021-09-21 ENCOUNTER — APPOINTMENT (OUTPATIENT)
Dept: RADIOLOGY | Facility: HOSPITAL | Age: 71
End: 2021-09-21
Payer: MEDICARE

## 2021-09-21 VITALS
TEMPERATURE: 97.9 F | BODY MASS INDEX: 32.95 KG/M2 | DIASTOLIC BLOOD PRESSURE: 75 MMHG | OXYGEN SATURATION: 98 % | HEIGHT: 66 IN | SYSTOLIC BLOOD PRESSURE: 138 MMHG | RESPIRATION RATE: 18 BRPM | HEART RATE: 83 BPM | WEIGHT: 205 LBS

## 2021-09-21 PROCEDURE — 64493 INJ PARAVERT F JNT L/S 1 LEV: CPT | Performed by: ANESTHESIOLOGY

## 2021-09-21 PROCEDURE — 76000 FLUOROSCOPY <1 HR PHYS/QHP: CPT

## 2021-09-21 PROCEDURE — 64494 INJ PARAVERT F JNT L/S 2 LEV: CPT | Performed by: ANESTHESIOLOGY

## 2021-09-21 RX ORDER — ALPRAZOLAM 0.5 MG/1
0.5 TABLET ORAL ONCE
Status: COMPLETED | OUTPATIENT
Start: 2021-09-21 | End: 2021-09-21

## 2021-09-21 RX ORDER — METHYLPREDNISOLONE ACETATE 80 MG/ML
INJECTION, SUSPENSION INTRA-ARTICULAR; INTRALESIONAL; INTRAMUSCULAR; SOFT TISSUE AS NEEDED
Status: DISCONTINUED | OUTPATIENT
Start: 2021-09-21 | End: 2021-09-21 | Stop reason: HOSPADM

## 2021-09-21 RX ORDER — LIDOCAINE HYDROCHLORIDE 10 MG/ML
INJECTION, SOLUTION EPIDURAL; INFILTRATION; INTRACAUDAL; PERINEURAL AS NEEDED
Status: DISCONTINUED | OUTPATIENT
Start: 2021-09-21 | End: 2021-09-21 | Stop reason: HOSPADM

## 2021-09-21 RX ORDER — BUPIVACAINE HYDROCHLORIDE 2.5 MG/ML
INJECTION, SOLUTION EPIDURAL; INFILTRATION; INTRACAUDAL AS NEEDED
Status: DISCONTINUED | OUTPATIENT
Start: 2021-09-21 | End: 2021-09-21 | Stop reason: HOSPADM

## 2021-09-21 RX ADMIN — ALPRAZOLAM 0.5 MG: 0.5 TABLET ORAL at 12:18

## 2021-09-21 NOTE — INTERVAL H&P NOTE
H&P reviewed  After examining the patient I find no changes in the patients condition since the H&P had been written      Vitals:    09/21/21 1204   BP: 159/76   Pulse: 86   Resp: 18   Temp: 98 °F (36 7 °C)   SpO2: 98%

## 2021-09-21 NOTE — PROCEDURES
Pre-procedure Diagnosis: Lumbar Facet Arthropathy  Post-procedure Diagnosis: Lumbar Facet Arthropathy  Procedure Title(s):  [RIGHT L3, L4, L5] medial branch nerve blocks [(CONFIRMATORY)]  Attending Surgeon:   Justyna Tamez MD  Anesthesia:   Local     Indications: The patient is a 80y o  year-old female with a diagnosis of lumbar facet arthropathy  The patient's history and physical exam were reviewed  The risks, benefits and alternatives to the procedure were discussed, and all questions were answered to the patient's satisfaction  The patient agreed to proceed, and written informed consent was obtained  Procedure in Detail: The patient was brought into the procedure room and placed in the prone position on the fluoroscopy table  The area of the lumbar spine was prepped with chloraprep and then draped in a sterile manner  AP fluoroscopy was used to identify the [L3-L5] levels on the [RIGHT] side  The C-arm was obliqued to visualize the junction of the superior articulate process and transverse process  The sacral ala was identified and marked  The skin in these identified areas was anesthetized with 1% lidocaine  A 22-gauge, 5-inch spinal needle was advanced toward each of these points under fluoroscopic guidance  Once bone was contacted, negative aspiration was confirmed and [1-mL] of a [4mL]mixture of [3mL] [0 25% bupivicaine] and 1mL of 80mg/mL Depomedrol was injected at each level  After the procedure was completed, the patient's back was cleaned and bandages were placed at the needle insertion sites  Disposition: The patient tolerated the procedure well, and there were no apparent complications  The patient was taken to the recovery area where written discharge instructions for the procedure were given  The patient was given a pain diary to determine if the patient's pain improves following the injection   Once the diary is returned we will consider next appropriate course of treatment      Postoperative pain relief [WAS] significant    Estimated Blood Loss: None  Specimens Obtained: N/A

## 2021-09-21 NOTE — DISCHARGE INSTRUCTIONS
PLEASE SCHEDULE 2 WEEK FOLLOW UP BY CALLING THE [facility] [place]: [phone]    MEDIAL BRANCH BLOCK DISCHARGE INSTRUCTIONS      ACTIVITY  · Please do activities that will bring the normal pain that we are rating  For example, if vacuuming or walking increases the painm do that  Ilan twill give the most accurate response to the diary  · You may shower, but no tub baths today, or applied heat  CARE OF THE INJECTION SITE  · This area may be numb for several hours after the injection  · Notify the [facility] if you have any of the following: redness, drainage, swelling or fever above 100°F     SPECIAL INSTRUCTIONS  · Please return the MBB diary to our office by mail, fax, or drop it off  MEDICATIONS  · Please do not take any break through or short acting pain medications for 8 hours after the block  · Continue to take all routine medications  · Our office may have instructed you to hold some medications  · You may resume _______________________________________________  If you have any problems specifically related to your procedure, please call our office at [phone]  Problems not related to your procedure should be directed at your primary care physician  Lumbar Radiofrequency Ablation   WHAT YOU NEED TO KNOW:   Lumbar radiofrequency ablation (RFA) is a procedure used to treat facet joint pain in your lower back  Facet joints are found at the back of each vertebra  A needle electrode is used to send electrical currents to the nerves in your facet joint  The electrical currents create heat that damages the nerve so it cannot send pain signals  DISCHARGE INSTRUCTIONS:   Seek care immediately if:   · You cannot move your leg  · You cannot control your urine or bowel movements  · You have severe pain in your lower back  Contact your healthcare provider if:   · You have leg weakness       · You develop new symptoms  · You have questions or concerns about your condition or care  Medicines:   · Pain medicine  may be given  Ask how to take this medicine safely  · Take your medicine as directed  Contact your healthcare provider if you think your medicine is not helping or if you have side effects  Tell him or her if you are allergic to any medicine  Keep a list of the medicines, vitamins, and herbs you take  Include the amounts, and when and why you take them  Bring the list or the pill bottles to follow-up visits  Carry your medicine list with you in case of an emergency  Follow up with your healthcare provider as directed:  Write down your questions so you remember to ask them during your visits  Activity:  Do not drive a car or operate machinery within 24 hours after your procedure  Ask your healthcare provider about any other activities you should avoid  © Copyright [employer] 2021 Information is for End User's use only and may not be sold, redistributed or otherwise used for commercial purposes  All illustrations and images included in CareNotes® are the copyrighted property of A D [employer] M [employer]  or [facility] [employer]   The above information is an  only  It is not intended as medical advice for individual conditions or treatments  Talk to your doctor, nurse or pharmacist before following any medical regimen to see if it is safe and effective for you

## 2021-09-21 NOTE — OP NOTE
OPERATIVE REPORT  PATIENT NAME: Sandoval Garcia    :  1950  MRN: [id number]  Pt Location:  GI ROOM 01    SURGERY DATE: 2021    Surgeon(s) and Role: Lion Ortez MD - Primary    Preop Diagnosis:  Lumbar facet arthropathy [M47 816]    Post-Op Diagnosis Codes:     * Lumbar facet arthropathy [M47 816]    Procedure(s) (LRB):  BLOCK MEDIAL BRANCH Right L3, L4, L5 #2 (Right)    Specimen(s):  * No specimens in log *    Estimated Blood Loss:   Minimal    Drains:  * No LDAs found *    Anesthesia Type:   Local    Operative Indications:  Lumbar facet arthropathy [M47 816]    Operative Findings:  Right L3, L4, L5 medial branch nerve regions identified under fluoroscopic guidance      Complications:   None    Procedure and Technique:  Please see detailed procedure note     I was present for the entire procedure    Patient Disposition:  PACU     SIGNATURE: Galindo Wadsworth MD  DATE: 2021  TIME: 12:50 PM

## 2021-10-04 ENCOUNTER — TELEMEDICINE (OUTPATIENT)
Dept: PAIN MEDICINE | Facility: CLINIC | Age: 71
End: 2021-10-04
Payer: MEDICARE

## 2021-10-04 DIAGNOSIS — M47.816 LUMBAR FACET ARTHROPATHY: Primary | ICD-10-CM

## 2021-10-04 PROCEDURE — G2012 BRIEF CHECK IN BY MD/QHP: HCPCS | Performed by: ANESTHESIOLOGY

## 2021-10-04 PROCEDURE — 1124F ACP DISCUSS-NO DSCNMKR DOCD: CPT | Performed by: ANESTHESIOLOGY

## 2021-10-04 RX ORDER — LATANOPROST 50 UG/ML
SOLUTION/ DROPS OPHTHALMIC
COMMUNITY
Start: 2021-09-17 | End: 2021-11-03

## 2021-10-04 RX ORDER — FAMOTIDINE 40 MG/1
TABLET, FILM COATED ORAL
COMMUNITY

## 2021-10-06 ENCOUNTER — TELEPHONE (OUTPATIENT)
Dept: PAIN MEDICINE | Facility: CLINIC | Age: 71
End: 2021-10-06

## 2021-10-08 ENCOUNTER — PREP FOR PROCEDURE (OUTPATIENT)
Dept: PAIN MEDICINE | Facility: CLINIC | Age: 71
End: 2021-10-08

## 2021-10-08 DIAGNOSIS — M47.816 LUMBAR FACET ARTHROPATHY: Primary | ICD-10-CM

## 2021-10-14 ENCOUNTER — APPOINTMENT (OUTPATIENT)
Dept: RADIOLOGY | Facility: HOSPITAL | Age: 71
End: 2021-10-14
Payer: MEDICARE

## 2021-10-14 ENCOUNTER — TELEPHONE (OUTPATIENT)
Dept: RADIOLOGY | Facility: CLINIC | Age: 71
End: 2021-10-14

## 2021-10-14 ENCOUNTER — HOSPITAL ENCOUNTER (OUTPATIENT)
Facility: HOSPITAL | Age: 71
Setting detail: OUTPATIENT SURGERY
Discharge: HOME | End: 2021-10-14
Attending: ANESTHESIOLOGY | Admitting: ANESTHESIOLOGY
Payer: MEDICARE

## 2021-10-14 VITALS
BODY MASS INDEX: 32.95 KG/M2 | HEART RATE: 84 BPM | RESPIRATION RATE: 18 BRPM | SYSTOLIC BLOOD PRESSURE: 143 MMHG | WEIGHT: 205 LBS | TEMPERATURE: 97.8 F | DIASTOLIC BLOOD PRESSURE: 82 MMHG | HEIGHT: 66 IN | OXYGEN SATURATION: 97 %

## 2021-10-14 PROCEDURE — 64636 DESTROY L/S FACET JNT ADDL: CPT | Performed by: ANESTHESIOLOGY

## 2021-10-14 PROCEDURE — 76000 FLUOROSCOPY <1 HR PHYS/QHP: CPT

## 2021-10-14 PROCEDURE — 64635 DESTROY LUMB/SAC FACET JNT: CPT | Performed by: ANESTHESIOLOGY

## 2021-10-14 RX ORDER — ALPRAZOLAM 0.5 MG/1
0.5 TABLET ORAL ONCE
Status: COMPLETED | OUTPATIENT
Start: 2021-10-14 | End: 2021-10-14

## 2021-10-14 RX ORDER — METHYLPREDNISOLONE ACETATE 80 MG/ML
INJECTION, SUSPENSION INTRA-ARTICULAR; INTRALESIONAL; INTRAMUSCULAR; SOFT TISSUE AS NEEDED
Status: DISCONTINUED | OUTPATIENT
Start: 2021-10-14 | End: 2021-10-14 | Stop reason: HOSPADM

## 2021-10-14 RX ORDER — LIDOCAINE HYDROCHLORIDE 20 MG/ML
INJECTION, SOLUTION EPIDURAL; INFILTRATION; INTRACAUDAL; PERINEURAL AS NEEDED
Status: DISCONTINUED | OUTPATIENT
Start: 2021-10-14 | End: 2021-10-14 | Stop reason: HOSPADM

## 2021-10-14 RX ORDER — OXYCODONE HYDROCHLORIDE AND ACETAMINOPHEN 5; 325 MG/1; MG/1
1 TABLET ORAL ONCE
Status: COMPLETED | OUTPATIENT
Start: 2021-10-14 | End: 2021-10-14

## 2021-10-14 RX ORDER — BUPIVACAINE HYDROCHLORIDE 2.5 MG/ML
INJECTION, SOLUTION EPIDURAL; INFILTRATION; INTRACAUDAL AS NEEDED
Status: DISCONTINUED | OUTPATIENT
Start: 2021-10-14 | End: 2021-10-14 | Stop reason: HOSPADM

## 2021-10-14 RX ADMIN — OXYCODONE HYDROCHLORIDE AND ACETAMINOPHEN 1 TABLET: 5; 325 TABLET ORAL at 07:56

## 2021-10-14 RX ADMIN — ALPRAZOLAM 0.5 MG: 0.5 TABLET ORAL at 07:56

## 2021-11-03 ENCOUNTER — OFFICE VISIT (OUTPATIENT)
Dept: PAIN MEDICINE | Facility: CLINIC | Age: 71
End: 2021-11-03
Payer: MEDICARE

## 2021-11-03 VITALS
DIASTOLIC BLOOD PRESSURE: 78 MMHG | HEIGHT: 67 IN | SYSTOLIC BLOOD PRESSURE: 142 MMHG | BODY MASS INDEX: 33.24 KG/M2 | HEART RATE: 83 BPM | TEMPERATURE: 97.5 F | WEIGHT: 211.8 LBS | RESPIRATION RATE: 20 BRPM

## 2021-11-03 DIAGNOSIS — F11.20 UNCOMPLICATED OPIOID DEPENDENCE (HCC): ICD-10-CM

## 2021-11-03 DIAGNOSIS — M54.16 LUMBAR RADICULOPATHY: Primary | ICD-10-CM

## 2021-11-03 DIAGNOSIS — M96.1 POSTLAMINECTOMY SYNDROME: ICD-10-CM

## 2021-11-03 DIAGNOSIS — G89.4 CHRONIC PAIN SYNDROME: ICD-10-CM

## 2021-11-03 PROCEDURE — 99214 OFFICE O/P EST MOD 30 MIN: CPT | Performed by: ANESTHESIOLOGY

## 2021-11-03 RX ORDER — TRAMADOL HYDROCHLORIDE 50 MG/1
50 TABLET ORAL EVERY 8 HOURS PRN
Qty: 90 TABLET | Refills: 0 | Status: SHIPPED | OUTPATIENT
Start: 2021-11-03

## 2021-11-06 LAB
6MAM UR QL CFM: NEGATIVE NG/ML
7AMINOCLONAZEPAM UR QL CFM: NEGATIVE NG/ML
A-OH ALPRAZ UR QL CFM: NEGATIVE NG/ML
ACCEPTABLE CREAT UR QL: NORMAL MG/DL
ACCEPTIBLE SP GR UR QL: NORMAL
AMPHET UR QL CFM: NEGATIVE NG/ML
AMPHET UR QL CFM: NEGATIVE NG/ML
BUPRENORPHINE UR QL CFM: NEGATIVE NG/ML
BZE UR QL CFM: NEGATIVE NG/ML
CARISOPRODOL UR QL CFM: NEGATIVE NG/ML
CODEINE UR QL CFM: NEGATIVE NG/ML
EDDP UR QL CFM: NEGATIVE NG/ML
ETHYL GLUCURONIDE UR QL SCN: NEGATIVE NG/ML
FENTANYL UR QL CFM: NEGATIVE NG/ML
GLIADIN IGG SER IA-ACNC: NEGATIVE NG/ML
HYDROCODONE UR QL CFM: NEGATIVE NG/ML
HYDROCODONE UR QL CFM: NEGATIVE NG/ML
HYDROMORPHONE UR QL CFM: NEGATIVE NG/ML
LORAZEPAM UR QL CFM: NEGATIVE NG/ML
MDMA UR QL CFM: NEGATIVE NG/ML
MEPROBAMATE UR QL CFM: NEGATIVE NG/ML
METHADONE UR QL CFM: NEGATIVE NG/ML
METHAMPHET UR QL CFM: NEGATIVE NG/ML
MORPHINE UR QL CFM: NEGATIVE NG/ML
MORPHINE UR QL CFM: NEGATIVE NG/ML
NITRITE UR QL: NORMAL UG/ML
NORBUPRENORPHINE UR QL CFM: NEGATIVE NG/ML
NORDIAZEPAM UR QL CFM: NEGATIVE NG/ML
NORFENTANYL UR QL CFM: NEGATIVE NG/ML
NORHYDROCODONE UR QL CFM: NEGATIVE NG/ML
NORHYDROCODONE UR QL CFM: NEGATIVE NG/ML
NOROXYCODONE UR QL CFM: NEGATIVE NG/ML
OXAZEPAM UR QL CFM: NEGATIVE NG/ML
OXYCODONE UR QL CFM: NEGATIVE NG/ML
OXYMORPHONE UR QL CFM: NEGATIVE NG/ML
OXYMORPHONE UR QL CFM: NEGATIVE NG/ML
RESULT ALL_PRESCRIBED MEDS AND SPECIAL INSTRUCTIONS: NORMAL
SL AMB 3-METHYL-FENTANYL QUANTIFICATION: NORMAL NG/ML
SL AMB 4-ANPP QUANTIFICATION: NORMAL NG/ML
SL AMB 4-FIBF QUANTIFICATION: NORMAL NG/ML
SL AMB 7-OH-MITRAGYNINE (KRATOM ALKALOID) QUANTIFICATION: NEGATIVE NG/ML
SL AMB ACETYL FENTANYL QUANTIFICATION: NORMAL NG/ML
SL AMB ACETYL NORFENTANYL QUANTIFICATION: NORMAL NG/ML
SL AMB ACRYL FENTANYL QUANTIFICATION: NORMAL NG/ML
SL AMB BUTRYL FENTANYL QUANTIFICATION: NORMAL NG/ML
SL AMB CARFENTANIL QUANTIFICATION: NORMAL NG/ML
SL AMB CITALOPRAM/ESCITALOPRAM QUANTIFICATION: NEGATIVE NG/ML
SL AMB CITALOPRAM/ESCITALOPRAM QUANTIFICATION: NEGATIVE NG/ML
SL AMB CTHC (MARIJUANA METABOLITE) QUANTIFICATION: NEGATIVE NG/ML
SL AMB CYCLOPROPYL FENTANYL QUANTIFICATION: NORMAL NG/ML
SL AMB FURANYL FENTANYL QUANTIFICATION: NORMAL NG/ML
SL AMB HYDROXYBUPROPION QUANTIFICATION: NEGATIVE NG/ML
SL AMB METHOXYACETYL FENTANYL QUANTIFICATION: NORMAL NG/ML
SL AMB N-DESMETHYL U-47700 QUANTIFICATION: NORMAL NG/ML
SL AMB N-DESMETHYL-TRAMADOL QUANTIFICATION: NEGATIVE NG/ML
SL AMB PREGABALIN QUANTIFICATION: NEGATIVE
SL AMB U-47700 QUANTIFICATION: NORMAL NG/ML
SPECIMEN PH ACCEPTABLE UR: NORMAL
TAPENTADOL UR QL CFM: NEGATIVE NG/ML
TEMAZEPAM UR QL CFM: NEGATIVE NG/ML
TEMAZEPAM UR QL CFM: NEGATIVE NG/ML
TRAMADOL UR QL CFM: NEGATIVE NG/ML
URATE/CREAT 24H UR: NEGATIVE NG/ML

## 2021-11-08 ENCOUNTER — TELEPHONE (OUTPATIENT)
Dept: PAIN MEDICINE | Facility: CLINIC | Age: 71
End: 2021-11-08

## 2023-03-21 ENCOUNTER — EVALUATION (OUTPATIENT)
Dept: PHYSICAL THERAPY | Facility: CLINIC | Age: 73
End: 2023-03-21

## 2023-03-21 DIAGNOSIS — M70.61 TROCHANTERIC BURSITIS, RIGHT HIP: ICD-10-CM

## 2023-03-21 DIAGNOSIS — M25.551 RIGHT HIP PAIN: ICD-10-CM

## 2023-03-21 DIAGNOSIS — Z96.641 HISTORY OF TOTAL RIGHT HIP REPLACEMENT: ICD-10-CM

## 2023-03-21 DIAGNOSIS — S76.011D TEAR OF GLUTEUS MINIMUS TENDON, RIGHT, SUBSEQUENT ENCOUNTER: Primary | ICD-10-CM

## 2023-03-21 NOTE — LETTER
2023    Bg Millard, [address] [facility]  [address]  [facility]    Patient: Cherylene Cuff   YOB: 1950   Date of Visit: 3/21/2023     Encounter Diagnosis     ICD-10-CM    1  Tear of gluteus minimus tendon, right, subsequent encounter  S76 011D       2  History of total right hip replacement  Z96 641       3  Trochanteric bursitis, right hip  M70 61       4  Right hip pain  M25 551           Dear Dr Thalia Henry: Thank you for your recent referral of Cherylene Cuff  Please review the attached evaluation summary from A Porsche's recent visit  Please verify that you agree with the plan of care by signing the attached order  If you have any questions or concerns, please do not hesitate to call  I sincerely appreciate the opportunity to share in the care of one of your patients and hope to have another opportunity to work with you in the near future  Sincerely,    Manuel Louis, PT      Referring Provider:      I certify that I have read the below Plan of Care and certify the need for these services furnished under this plan of treatment while under my care  Bg Millard MD  [address] [id number]  Via Fax: [phone]          PT Evaluation     Today's date: 3/22/2023  Patient name: Cherylene Cuff  : 1950  MRN: [id number]  Referring provider: Cassie Lomas, *  Dx:   Encounter Diagnosis     ICD-10-CM    1  Tear of gluteus minimus tendon, right, subsequent encounter  S76 011D       2  History of total right hip replacement  Z96 641       3  Trochanteric bursitis, right hip  M70 61       4   Right hip pain  M25 551                      Assessment  Assessment details: 67 yo motivated  female referred to care for  R gluteus minimus tear presenting with strength deficits, single limb balance deficits, decreased pelvic dynamic control,  gait dysfunction and impairments of ambulation tolerances, gait pattern, pain and limited walking for exercise and community outings, recreation family  Prior hx of chronic hip pain, chronic long distance ambulation deficits, use of AD, prior R hip surgery, back surgery and L hip OA may impact ability to achieve rehabilitation goals  MRI findings of positive gluteus minimus tear and tendinosis of gluteus medius tendon may impact ability to achieve goals  Patient was educated in prognosis of achieving goals of care  Impairments: abnormal or restricted ROM, impaired physical strength, lacks appropriate home exercise program, pain with function and weight-bearing intolerance  Functional limitations: distance ambulation, develops + trendelenberg adn pain  100 ft  w/o AD  Symptom irritability: moderateUnderstanding of Dx/Px/POC: good   Prognosis: fair  Prognosis details: Fair to good - MRI findings , prior hx of chronic hip pain, chronic long distance ambulation deficits, use of AD, prior R hip surgery, prior injections,  back surgery and L hip OA may impact ability to achieve rehabilitation goals  Goals  STG 2-4 weeks  Increase  R hip single limb stance to 20-30 seconds to improve hip/pelvioc stability with gait  Increase  Core and hip strength by  1/2 MMT  To allow for improved ambulation tolerances and decrased hip pain, no greater than 1-2/10 amb   40-50 min with least AD  LTG 4-6 weeks:  Increase strength R hip and core mm by 1 MMT to allow for ambulation no to least AD 60 min with pain no greater than 2/10 R hip  Improve SLB RLE to 40-60 seconds to allow for improved endurance stability of hip/pelvis with ambulation  Patient I with final HEP and self managements to self manage and reduce likelihood of recurrence following d/c to HEP      Plan  Patient would benefit from: skilled physical therapy  Planned modality interventions: cryotherapy  Planned therapy interventions: manual therapy, abdominal trunk stabilization, balance/weight bearing training, neuromuscular re-education, patient education, therapeutic exercise, home exercise program, graded exercise, functional ROM exercises and flexibility  Frequency: 2x week  Duration in weeks: 6  Plan of Care beginning date: 3/21/2023  Plan of Care expiration date: 2023  Treatment plan discussed with: patient        Subjective Evaluation    History of Present Illness  Mechanism of injury: Hx of right hip pain, underwent THR RLE  21 and completed PT rehabilitation and reports hx of persistent difficulty with ambulation following feeling something "was not quite right"  Underwent  lumbar surgery  22 fusion of L4-5 which she notes is well healed but did have rhizotomy 2023 secondary to persistent pain  Patient  CC  Hip pain, limp and difficulty walking distances  Current ability is  30-40 minutes with use of SLC 3 days per week for exercise  She reports increasing tolerance for ambuation over the past 6 months since prior PT and  Initiation of walking program, but pain persists  Patient using ice and alleve, sitting, cane and heel lift on L,   rest from WB to assist with pain inR  hip  Denies any night pain   Patient notes recent xray assessment of L hip shows advanced OA and surgical approach has been discussed with her orthopedist            [place] jaimes    Quality of life: excellent    Pain  Current pain ratin  At best pain ratin  At worst pain ratin  Location: R posterolateral hip  Quality: sharp, dull ache and discomfort  Relieving factors: ice, relaxation, rest, support and medications  Aggravating factors: standing  Progression: improved    Social Support  Lives in: multiple-level home  Lives with: spouse    Employment status: not working  Exercise history: 30-40 min 3 days per week - walking with SLC      Diagnostic Tests  X-ray: abnormal  MRI studies: abnormal  Treatments  Previous treatment: physical therapy, injection treatment and medication (heel lift L, SLC use, 3 injections for bursitis post THR)  Current treatment: medication  Patient Goals  Patient goals for therapy: decreased pain and increased strength  Patient goal: walking  tolerances to increase with no to little pain, to be more active with family  and walking in community, recreationslly and for exercise        Objective     Observations   Left Hip  Positive for atrophy  Right Hip  Positive for atrophy  Additional Observation Details  Atrophy of gluteal musculature B LEs, lumbar incision well healed and mobile scar  Increase length of RLE versus L  Scar posterolateral R hip from THR  Palpation     Right   Tenderness of the gluteus gigi, gluteus medius, iliopsoas, lumbar paraspinals, piriformis and TFL  Trigger point to gluteus medius  Lumbar Screen  Lumbar range of motion within normal limits with the following exceptions:Severe loss of lumbar extension with hx of fusion L4-5, strength of  RA/TA  3+/5 to 4-/5    Neurological Testing     Sensation     Hip   Left Hip   Intact: light touch    Right Hip   Intact: light touch    Active Range of Motion   Left Hip   Extension: 5 degrees   Abduction: 20 degrees   Adduction: 5 degrees with pain  External rotation (90/90): 30 degrees   Internal rotation (90/90): 10 degrees     Right Hip   Flexion: 95 degrees   Extension: 5 degrees   Abduction: 30 degrees   External rotation (90/90): 25 degrees   Internal rotation (90/90): 10 degrees     Additional Active Range of Motion Details  Moderate B HS muscle flexibility loss assessed supine PSLR  Knee AROM B  WNLs except for L knee  extenison decreased by  10 deg  From 0       Passive Range of Motion   Left Hip   Flexion: 110 degrees   Extension: 8 degrees   Abduction: 30 degrees with pain  Adduction: 5 degrees with pain    Right Hip   Flexion: 105 degrees   Extension: 7 degrees   Abduction: 35 degrees     Strength/Myotome Testing     Left Hip   Planes of Motion   Flexion: 4+  Extension: 4  Abduction: 4  Adduction: 4  External rotation: 4  Internal rotation: 4    Right Hip   Planes of Motion   Flexion: 4  Extension: 4  Abduction: 4  Adduction: 4  External rotation: 4-  Internal rotation: 4    Tests     Left Hip   Positive PETRONA and scour  SLR: Negative  Right Hip   SLR: Negative  Additional Tests Details  trendelenberg test  Negative with SLB RLE x 10-15 seconds but is observed as positive with ambulation 100 ft    With pain  3/10 R post hip            Precautions: RTHR, lumbar fusion  L4-5, OA L hip     Daily Treatment Diary:      Initial Evaluation Date: 03/22/23  Compliance 3/21                     Visit Number 1                    Re-Eval  IE                 [facility]   [employer]                           3/21                     Manual                      STM/TPR R gluteals, TFL, ITB 10m                                                                 Ther-Ex                      Bridging  B, R only 10x each                     LS/L RLE hip abd w/ext knee neutral and ER hip 10 ea                     Clam shell and firehydr mod -                     Self HS stretch -                     SLR, PNF OKC, CKC -                     Step up lat -                     Lunge walk outs  -                                                                                       Neuro Re-Ed                      SLB RLE 10" x 3                     Min isquats AE             AE step overs                                       Ther-Act              pt ed [facility] use PC                                                Modalities                      CP R hip -

## 2023-03-21 NOTE — PROGRESS NOTES
PT Evaluation     Today's date: 3/22/2023  Patient name: Gary Walker  : 1950  MRN: [id number]  Referring provider: Kiley Rivera, *  Dx:   Encounter Diagnosis     ICD-10-CM    1  Tear of gluteus minimus tendon, right, subsequent encounter  S76 011D       2  History of total right hip replacement  Z96 641       3  Trochanteric bursitis, right hip  M70 61       4  Right hip pain  M25 551                      Assessment  Assessment details: 67 yo motivated  female referred to care for  R gluteus minimus tear presenting with strength deficits, single limb balance deficits, decreased pelvic dynamic control,  gait dysfunction and impairments of ambulation tolerances, gait pattern, pain and limited walking for exercise and community outings, recreation family  Prior hx of chronic hip pain, chronic long distance ambulation deficits, use of AD, prior R hip surgery, back surgery and L hip OA may impact ability to achieve rehabilitation goals  MRI findings of positive gluteus minimus tear and tendinosis of gluteus medius tendon may impact ability to achieve goals  Patient was educated in prognosis of achieving goals of care  Impairments: abnormal or restricted ROM, impaired physical strength, lacks appropriate home exercise program, pain with function and weight-bearing intolerance  Functional limitations: distance ambulation, develops + trendelenberg adn pain  100 ft  w/o AD  Symptom irritability: moderateUnderstanding of Dx/Px/POC: good   Prognosis: fair  Prognosis details: Fair to good - MRI findings , prior hx of chronic hip pain, chronic long distance ambulation deficits, use of AD, prior R hip surgery, prior injections,  back surgery and L hip OA may impact ability to achieve rehabilitation goals         Goals  STG 2-4 weeks  Increase  R hip single limb stance to 20-30 seconds to improve hip/pelvioc stability with gait  Increase  Core and hip strength by  1/2 MMT  To allow for improved ambulation tolerances and decrased hip pain, no greater than 1-2/10 amb  40-50 min with least AD  LTG 4-6 weeks:  Increase strength R hip and core mm by 1 MMT to allow for ambulation no to least AD 60 min with pain no greater than 2/10 R hip  Improve SLB RLE to 40-60 seconds to allow for improved endurance stability of hip/pelvis with ambulation  Patient I with final HEP and self managements to self manage and reduce likelihood of recurrence following d/c to HEP      Plan  Patient would benefit from: skilled physical therapy  Planned modality interventions: cryotherapy  Planned therapy interventions: manual therapy, abdominal trunk stabilization, balance/weight bearing training, neuromuscular re-education, patient education, therapeutic exercise, home exercise program, graded exercise, functional ROM exercises and flexibility  Frequency: 2x week  Duration in weeks: 6  Plan of Care beginning date: 3/21/2023  Plan of Care expiration date: 5/2/2023  Treatment plan discussed with: patient        Subjective Evaluation    History of Present Illness  Mechanism of injury: Hx of right hip pain, underwent THR RLE  6/1/21 and completed PT rehabilitation and reports hx of persistent difficulty with ambulation following feeling something "was not quite right"  Underwent  lumbar surgery  2/18/22 fusion of L4-5 which she notes is well healed but did have rhizotomy 2/14/2023 secondary to persistent pain  Patient  CC  Hip pain, limp and difficulty walking distances  Current ability is  30-40 minutes with use of SLC 3 days per week for exercise  She reports increasing tolerance for ambuation over the past 6 months since prior PT and  Initiation of walking program, but pain persists  Patient using ice and alleve, sitting, cane and heel lift on L,   rest from WB to assist with pain inR  hip  Denies any night pain   Patient notes recent xray assessment of L hip shows advanced OA and surgical approach has been discussed with her orthopedist  Recurrent probem    Quality of life: excellent    Pain  Current pain ratin  At best pain ratin  At worst pain ratin  Location: R posterolateral hip  Quality: sharp, dull ache and discomfort  Relieving factors: ice, relaxation, rest, support and medications  Aggravating factors: standing  Progression: improved    Social Support  Lives in: multiple-level home  Lives with: spouse    Employment status: not working  Exercise history: 30-40 min 3 days per week - walking with [facility] & [address]      Diagnostic Tests  X-ray: abnormal  MRI studies: abnormal  Treatments  Previous treatment: physical therapy, injection treatment and medication (heel lift L, SLC use, 3 injections for bursitis post THR)  Current treatment: medication  Patient Goals  Patient goals for therapy: decreased pain and increased strength  Patient goal: walking  tolerances to increase with no to little pain, to be more active with family  and walking in community, recreationslly and for exercise        Objective     Observations   Left Hip  Positive for atrophy  Right Hip  Positive for atrophy  Additional Observation Details  Atrophy of gluteal musculature B LEs, lumbar incision well healed and mobile scar  Increase length of RLE versus L  Scar posterolateral R hip from THR  Palpation     Right   Tenderness of the gluteus gigi, gluteus medius, iliopsoas, lumbar paraspinals, piriformis and TFL  Trigger point to gluteus medius       Lumbar Screen  Lumbar range of motion within normal limits with the following exceptions:Severe loss of lumbar extension with hx of fusion L4-5, strength of  RA/TA  3+/5 to 4-/5    Neurological Testing     Sensation     Hip   Left Hip   Intact: light touch    Right Hip   Intact: light touch    Active Range of Motion   Left Hip   Extension: 5 degrees   Abduction: 20 degrees   Adduction: 5 degrees with pain  External rotation (90/90): 30 degrees   Internal rotation (90/90): 10 degrees     Right Hip   Flexion: 95 degrees   Extension: 5 degrees   Abduction: 30 degrees   External rotation (90/90): 25 degrees   Internal rotation (90/90): 10 degrees     Additional Active Range of Motion Details  Moderate B HS muscle flexibility loss assessed supine PSLR  Knee AROM B  WNLs except for L knee  extenison decreased by  10 deg  From 0  Passive Range of Motion   Left Hip   Flexion: 110 degrees   Extension: 8 degrees   Abduction: 30 degrees with pain  Adduction: 5 degrees with pain    Right Hip   Flexion: 105 degrees   Extension: 7 degrees   Abduction: 35 degrees     Strength/Myotome Testing     Left Hip   Planes of Motion   Flexion: 4+  Extension: 4  Abduction: 4  Adduction: 4  External rotation: 4  Internal rotation: 4    Right Hip   Planes of Motion   Flexion: 4  Extension: 4  Abduction: 4  Adduction: 4  External rotation: 4-  Internal rotation: 4    Tests     Left Hip   Positive PETRONA and scour  SLR: Negative  Right Hip   SLR: Negative  Additional Tests Details  trendelenberg test  Negative with SLB RLE x 10-15 seconds but is observed as positive with ambulation 100 ft    With pain  3/10 R post hip             Precautions: RTHR, lumbar fusion  L4-5, OA L hip     Daily Treatment Diary:      Initial Evaluation Date: 03/22/23  Compliance 3/21                     Visit Number 1                    Re-Eval  IE                 [address]   [employer] [employer]                           3/21                     Manual                      STM/TPR R gluteals, TFL, ITB 10m                                                                 Ther-Ex                      Bridging  B, R only 10x each                     LS/L RLE hip abd w/ext knee neutral and ER hip 10 ea                     Clam shell and firehydr mod -                     Self HS stretch -                     SLR, PNF OKC, CKC -                     Step up lat -                     Lunge walk outs  - Neuro Re-Ed                      SLB RLE 10" x 3                     Min isquats AE             AE step overs                                       Ther-Act              pt ed [facility] use PC                                                Modalities                      CP R hip -

## 2023-03-22 VITALS — OXYGEN SATURATION: 98 % | SYSTOLIC BLOOD PRESSURE: 128 MMHG | DIASTOLIC BLOOD PRESSURE: 80 MMHG | HEART RATE: 77 BPM

## 2023-03-23 ENCOUNTER — OFFICE VISIT (OUTPATIENT)
Dept: PHYSICAL THERAPY | Facility: CLINIC | Age: 73
End: 2023-03-23

## 2023-03-23 DIAGNOSIS — M25.551 RIGHT HIP PAIN: ICD-10-CM

## 2023-03-23 DIAGNOSIS — Z96.641 HISTORY OF TOTAL RIGHT HIP REPLACEMENT: ICD-10-CM

## 2023-03-23 DIAGNOSIS — S76.011D TEAR OF GLUTEUS MINIMUS TENDON, RIGHT, SUBSEQUENT ENCOUNTER: Primary | ICD-10-CM

## 2023-03-23 DIAGNOSIS — M70.61 TROCHANTERIC BURSITIS, RIGHT HIP: ICD-10-CM

## 2023-03-23 NOTE — PROGRESS NOTES
Daily Note     Today's date: 3/23/2023  Patient name: Perez Guido  : 1950  MRN: [id number]  Referring provider: Juan Manuel Rivas, *  Dx:   Encounter Diagnosis     ICD-10-CM    1  Tear of gluteus minimus tendon, right, subsequent encounter  S76 011D       2  History of total right hip replacement  Z96 641       3  Trochanteric bursitis, right hip  M70 61       4  Right hip pain  M25 551                      Subjective: " I walked in [address] [place] [address] about  6,000 step each day and my left hip became painful"      Objective: See treatment diary below      Assessment:   Patient progressed with care today including tband green clam shell and fire hydrants, B LE bridging with Gr Tband, AE step overs for neuro maritza, static stabs WBAT RLE 4 dir UE pulls and ended session with nustep 11:00 minutes  RPE 5  Patient progressed to mini squats 2 x 15 each,  Leg press with 75# 2 x 15 reps  Patient noted fatigue following care today but had no pain in either hip  Pt continues to require skiled care to achieve goals  Plan: Continue per plan of care        Precautions: RTHR, lumbar fusion  L4-5, OA L hip, HTN     Daily Treatment Diary:      Initial Evaluation Date: 23  Compliance 3/21  3/23                   Visit Number 1 2                   Re-Eval  IE -                MC   [employer] -                          3/21  3/23                   Manual                      STM/TPR R gluteals, TFL, ITB 10m  10m                                                               Ther-Ex                      Bridging  B, R only 10x each  B GTB 2x10,                    LS/L RLE hip abd w/ext knee neutral and ER hip 10 ea  2x10 AA to A                   Clam shell and firehydr mod -  GR Tband 1 x 10 ea                   Self HS stretch -  -                   SLR, PNF OKC, CKC -  -                   Step up lat -                     Lunge walk outs  -                                                                 mustep 11:00, L3; RPE 5                   Neuro Re-Ed                      SLB RLE 10" x 3  15"x3                   Min isquats AE  15x2 floor           AE step overs  30x RLE           Static stabs 4 dir  15x ea  4 dir GTB                         Ther-Act              pt ed SLC use PC  -                                              Modalities                      CP R hip -

## 2023-03-28 ENCOUNTER — OFFICE VISIT (OUTPATIENT)
Dept: PHYSICAL THERAPY | Facility: CLINIC | Age: 73
End: 2023-03-28

## 2023-03-28 DIAGNOSIS — Z96.641 HISTORY OF TOTAL RIGHT HIP REPLACEMENT: ICD-10-CM

## 2023-03-28 DIAGNOSIS — M70.61 TROCHANTERIC BURSITIS, RIGHT HIP: ICD-10-CM

## 2023-03-28 DIAGNOSIS — S76.011D TEAR OF GLUTEUS MINIMUS TENDON, RIGHT, SUBSEQUENT ENCOUNTER: Primary | ICD-10-CM

## 2023-03-28 DIAGNOSIS — M25.551 RIGHT HIP PAIN: ICD-10-CM

## 2023-03-28 NOTE — PROGRESS NOTES
"Daily Note     Today's date: 3/28/2023  Patient name: Vito Nieto  : 1950  MRN: [id number]  Referring provider: Royce Wesley, *  Dx:   Encounter Diagnosis     ICD-10-CM    1  Tear of gluteus minimus tendon, right, subsequent encounter  S76 011D       2  History of total right hip replacement  Z96 641       3  Trochanteric bursitis, right hip  M70 61       4  Right hip pain  M25 551                      Subjective: \" I am stiff today in R Hip today\"      Objective: See treatment diary below      Assessment: Tolerated treatment well  She completed strength ex with out pain sx  Gait mechanics demonstrated decreased stance RLE with no trunk rotation and decreases B arm swing but denied pain  Pt progressed to tband resisted ex, standing CKC RLE slides into flex, abd, ext LLE with finger tip A and dfficulty maintaining Balance RLE for task without UE A  Patient demonstrated fatigue post treatment, exhibited good technique with therapeutic exercises and would benefit from continued PT      Plan: Continue per plan of care        Precautions: RTHR, lumbar fusion  L4-5, OA L hip, HTN     Daily Treatment Diary:      Initial Evaluation Date: 23  Compliance 3/21  3/23  3/28                 Visit Number 1 2  3                 Re-Eval  IE -                MC   [employer] Y -                          3/21  3/23  3/28                 Manual                      STM/TPR R gluteals, TFL, ITB 10m  10m  10m  Roller ITB                                                             Ther-Ex                      Bridging  B, R only 10x each  B GTB 2x10,   B GTB 2x10                 LS/L RLE hip abd w/ext knee neutral and ER hip 10 ea  2x10 AA to A  2x15 AA to A                 Clam shell and firehydr mod -  GR Tband 1 x 10 ea  Gr TBand 2 x 10                 Self HS stretch -  -                   SLR, PNF OKC, CKC -  -                   Step up lat -                     Lunge walk outs  -                   " "  semirecl hip lifts 3\"     2 x 10                                       mustep   11:00, L3; RPE 5  11:00 L3, RPE 5                 Neuro Re-Ed                      SLB RLE 10\" x 3  15\"x3  15 x 3\"                 Min isquats AE  15x2 floor 15x2 AE          AE step overs  30x RLE 30x          Static stabs 4 dir  15x ea  4 dir GTB  -          Sliders LLE 3 way   2x10 UEA          Ther-Act              pt ed SLC use PC  -                                              Modalities                      CP R hip -                                                                 "

## 2023-03-30 ENCOUNTER — OFFICE VISIT (OUTPATIENT)
Dept: PHYSICAL THERAPY | Facility: CLINIC | Age: 73
End: 2023-03-30

## 2023-03-30 DIAGNOSIS — M25.551 RIGHT HIP PAIN: ICD-10-CM

## 2023-03-30 DIAGNOSIS — M70.61 TROCHANTERIC BURSITIS, RIGHT HIP: ICD-10-CM

## 2023-03-30 DIAGNOSIS — Z96.641 HISTORY OF TOTAL RIGHT HIP REPLACEMENT: ICD-10-CM

## 2023-03-30 DIAGNOSIS — S76.011D TEAR OF GLUTEUS MINIMUS TENDON, RIGHT, SUBSEQUENT ENCOUNTER: Primary | ICD-10-CM

## 2023-03-30 NOTE — PROGRESS NOTES
Daily Note     Today's date: 3/30/2023  Patient name: Sada Whitaker  : 1950  MRN: [id number]  Referring provider: Bella Simeon, *  Dx:   Encounter Diagnosis     ICD-10-CM    1  Tear of gluteus minimus tendon, right, subsequent encounter  S76 011D       2  History of total right hip replacement  Z96 641       3  Trochanteric bursitis, right hip  M70 61       4  Right hip pain  M25 551                      Subjective:  I feel good, no pain      Objective: See treatment diary below      Assessment: Tolerated treatment well including progression as documented for strengthening R Hip musculature  Patient did demonstrate improved dynamic control of R hip for  WB and progressed to CKC L hip PNF patterns today  Patient tolerated progression  With height of lift for hip lifts today semi reclined for hip strength progression  Pt completed leg extension and leg press isotonics for strength ex  Patient ended tx with  CP to right hip seated x 10 m to assist with any post tx muscle soreness  Patient is reporting good sx managements and functional improvements to date  Patient exhibited good technique with therapeutic exercises and would benefit from continued PT      Plan: Continue per plan of care        Precautions: RTHR, lumbar fusion  L4-5, OA L hip, HTN     Daily Treatment Diary:      Initial Evaluation Date: 23  Compliance 3/21  3/23  3/28  3/30               Visit Number 1 2  3  4               Re-Eval  IE -    -            MC   Foto Captured Y -    -                      3/21  3/23  3/28  3/30               Manual                      STM/TPR R gluteals, TFL, ITB 10m  10m  10m  Roller ITB  10 m   completed TPR and roller ITB                                                           Ther-Ex                      Bridging  B, R only 10x each  B GTB 2x10,   B GTB 2x10  B BTB  2x10; 2 x 10 RLE only                LS/L RLE hip abd w/ext knee neutral and ER hip 10 ea  2x10 AA to A  2x15 AA to A  2x15 "AA To A               Clam shell and firehydr mod -  GR Tband 1 x 10 ea  Gr TBand 2 x 10  Blue Tband  2x10               Self HS stretch -  -                   SLR, PNF OKC, CKC -  -    PNF CKC 1 x 15 WB On RLE               Step up lat -                     Lunge walk outs  -                     semirecl hip lifts 3\"     2 x 10  over plyoball 3 x 10               Leg press/ leg ext machines       2 x 10  3pl, 85# B               mustep   11:00, L3; RPE 5  11:00 L3, RPE 5  12:00  L3  RPE 7               Neuro Re-Ed                      SLB RLE 10\" x 3  15\"x3  15 x 3\"  15x3\"               Min isquats AE  15x2 floor 15x2 AE 15x2         AE step overs  30x RLE 30x 30x         Static stabs 4 dir  15x ea  4 dir GTB  - -         Sliders LLE 3 way   2x10 UEA 2 x 10 UEA          Ther-Act              pt ed SLC use PC  -                                              Modalities                      CP R hip -   10 m                                                                "

## 2023-04-04 ENCOUNTER — OFFICE VISIT (OUTPATIENT)
Dept: PHYSICAL THERAPY | Facility: CLINIC | Age: 73
End: 2023-04-04

## 2023-04-04 DIAGNOSIS — Z96.641 HISTORY OF TOTAL RIGHT HIP REPLACEMENT: ICD-10-CM

## 2023-04-04 DIAGNOSIS — S76.011D TEAR OF GLUTEUS MINIMUS TENDON, RIGHT, SUBSEQUENT ENCOUNTER: Primary | ICD-10-CM

## 2023-04-04 NOTE — PROGRESS NOTES
Daily Note     Today's date: 2023  Patient name: Sada Whitaker  : 1950  MRN: [id number]  Referring provider: Bella Simeon, *  Dx:   Encounter Diagnosis     ICD-10-CM    1  Tear of gluteus minimus tendon, right, subsequent encounter  S76 011D       2  History of total right hip replacement  Z96 641                    Subjective: Im doing well and  happy with having only mild soreness , no pain  Objective: See treatment diary below      Assessment: Tolerated treatment well and is showing improved dynamic control of R hip in WB with tasks in tx today  Patient tolerated 10 active reps of hip abd with ER in L S/L with good technqiue , improved from IE  Berdie Landau Patient demonstrated fatigue post treatment, exhibited good technique with therapeutic exercises and would benefit from continued PT  Discharge planning initiated today as pt is progressing well toward all goals of care  Anticipate d/c in the next 1-2 weeks  Plan: Continue per plan of care        Precautions: RTHR, lumbar fusion  L4-5, OA L hip, HTN     Daily Treatment Diary:      Initial Evaluation Date: 23  Compliance 3/21  3/23  3/28  3/30  4/4             Visit Number 1 2  3  4  5             Re-Eval  IE -    -  -          MC   Foto Captured Y -    -  Y                    3/21  3/23  3/28  3/30  4/4             Manual                      STM/TPR R gluteals, TFL, ITB 10m  10m  10m  Roller ITB  10 m   completed TPR and roller ITB  10m TPR, roller GM, ITB                                                         Ther-Ex                      Bridging  B, R only 10x each  B GTB 2x10,   B GTB 2x10  B BTB  2x10; 2 x 10 RLE only   B B TB 2 x 10, singel RLE bridge             LS/L RLE hip abd w/ext knee neutral and ER hip 10 ea  2x10 AA to A  2x15 AA to A  2x15 AA To A  10x A             Clam shell and firehydr mod -  GR Tband 1 x 10 ea  Gr TBand 2 x 10  Blue Tband  2x10  BTB 2 x 10             Self HS stretch -  -                   SLR, "PNF OKC, CKC -  -    PNF CKC 1 x 15 WB On RLE  PNF CKC 15x WB On RLE             Step up lat -                     Lunge walk outs  -                     semirecl hip lifts 3\"     2 x 10  over plyoball 3 x 10  -             Leg press/ leg ext machines       2 x 10  3pl, 85# B  2x10, 3 pl, 85# B leg press             mustep   11:00, L3; RPE 5  11:00 L3, RPE 5  12:00  L3  RPE 7  NT             Neuro Re-Ed                      SLB RLE 10\" x 3  15\"x3  15 x 3\"  15x3\"  15x4 on AE             Min isquats AE  15x2 floor 15x2 AE 15x2         AE step overs  30x RLE 30x 30x 30x        Static stabs 4 dir  15x ea  4 dir GTB  - -         Sliders LLE 3 way   2x10 UEA 2 x 10 UEA  20x UE A        Ther-Act              pt ed SLC use PC  -      Pt ed   D/C plan initiated                                        Modalities                      CP R hip -   10 m 10m                                                                 "

## 2023-04-06 ENCOUNTER — APPOINTMENT (OUTPATIENT)
Dept: PHYSICAL THERAPY | Facility: CLINIC | Age: 73
End: 2023-04-06

## 2023-10-24 ENCOUNTER — EVALUATION (OUTPATIENT)
Dept: PHYSICAL THERAPY | Facility: CLINIC | Age: 73
End: 2023-10-24
Payer: MEDICARE

## 2023-10-24 VITALS — DIASTOLIC BLOOD PRESSURE: 82 MMHG | OXYGEN SATURATION: 96 % | HEART RATE: 89 BPM | SYSTOLIC BLOOD PRESSURE: 140 MMHG

## 2023-10-24 DIAGNOSIS — G57.01 PIRIFORMIS SYNDROME, RIGHT: ICD-10-CM

## 2023-10-24 DIAGNOSIS — M76.31 ILIOTIBIAL BAND SYNDROME, RIGHT: Primary | ICD-10-CM

## 2023-10-24 DIAGNOSIS — M54.16 RIGHT LUMBAR RADICULOPATHY: ICD-10-CM

## 2023-10-24 PROCEDURE — 97162 PT EVAL MOD COMPLEX 30 MIN: CPT | Performed by: PHYSICAL THERAPIST

## 2023-10-24 NOTE — PROGRESS NOTES
PT Evaluation     Today's date: 10/24/2023  Patient name: Kat Espinoza  : 1950  MRN: [id number]  Referring provider: Jennifer Jacob MD  Dx:   Encounter Diagnosis     ICD-10-CM    1. Iliotibial band syndrome, right  M76.31       2. Piriformis syndrome, right  G57.01       3. Right lumbar radiculopathy  M54.16                      Assessment  Assessment details: 69 yo female dx with right gluteus minimus tear, fatty infiltration of gluteus medium muscle, OA of L hip, Right ITB syndrome, R piriformis syndrome with R sacroiliitis, s/p R THR and leg length discrepancy: Pt referred for physical therapy for stretching program with ambulation training. Pt with known hx of lumbar surgery, radiofrequency ablation in pain management. Prior use of heel lift for leg length discrepency. Current amb with use of SLC limited tolerances to short distances currently secondary to pain and gait dysfunction. Prior hx of lumbar fusion L4-5 and pain provocation with lumbar extension and R SB. Pt is anticipated to benefit from skilled care to address impairments and achieve goals outlined. Impairments: abnormal gait, abnormal or restricted ROM, impaired physical strength, lacks appropriate home exercise program, pain with function, weight-bearing intolerance and poor posture   Other impairment: SLS LLE 60 seconds,  RLE 30 seconds with sway no pain increase    Symptom irritability: moderateUnderstanding of Dx/Px/POC: good   Prognosis: fair  Prognosis details: Chronic pain, tear of gluteus minimus, lumbar fusion, prior injections may impact ability to achieve rehab goals of care    Goals  Goals  STG 2-4 weeks  Increase  R hip single limb stance to 20-30 seconds to improve hip/pelvioc stability with gait  Increase  Core and hip strength by  1/2 MMT  To allow for improved ambulation tolerances and decrased hip pain, no greater than 1-2/10 amb.  40-50 min.with least AD  LTG 4-6 weeks:  Increase strength R hip and core mm by 1 MMT to allow for ambulation no to least AD 60 min with pain no greater than 2/10 R hip  Improve SLB RLE to 40-60 seconds to allow for improved endurance stability of hip/pelvis with ambulation  Patient I with final HEP and self managements to self manage and reduce likelihood of recurrence following d/c to HEP      Plan  Patient would benefit from: skilled physical therapy  Planned modality interventions: cryotherapy and unattended electrical stimulation  Planned therapy interventions: abdominal trunk stabilization, gait training, functional ROM exercises, stretching, strengthening, neuromuscular re-education, manual therapy, home exercise program, therapeutic exercise, postural training and patient education  Frequency: 2x week  Duration in weeks: 5  Plan of Care beginning date: 10/24/2023  Plan of Care expiration date: 11/28/2023  Treatment plan discussed with: patient        Subjective Evaluation    History of Present Illness  Mechanism of injury: Chronc pain in R lateral hip since about one year duration. She has had PT in the past with improvements- most recently in 3/23 at our facility. In the past, she has tried heel lifts,  AD of cane, salon pas, arnica gel, tylenol, mobic or celebrex, CP, injections in Each Hip  and lumbar spine, lumbar rhizotomies. Most recent injections into R hip were completed 1/23. Most recent rhizotomy  8/15/23 with some relief reported. Lumbar fusion L4-5 2022. Pt was recently to Dr. Letty Olivarez for assessment of R gluteus minimus tear and open repair was recommended she reports. Pt notes she is seeking another opinion from [facility]. Pt notes regression in actvity tolerances and pain increases since June 2023 with out injury or known CADEN, but did travel for family vacation and had increase in pain sx. She did  note increase in sx of pain again with travel in September 2023 but no injury or incident occurring.      Was doing walking program at mall 30-35 minutes with [facility] in winter 2023 when she felt her best and now reports inability to tolerate 30-35 minutes secondary to her pain. Pt medications, PMH and allergies reviewed in chart and with  patient today. Recurrent probem    Quality of life: good    Patient Goals  Patient goals for therapy: decreased pain and increased strength  Patient goal: decrease pain medications to manage her pain, to stand to complete cooking, cleaning, ambulation longer and on stairs improved  Pain  Current pain ratin  At best pain ratin  At worst pain ratin  Quality: sharp, discomfort and dull ache  Relieving factors: relaxation, rest, ice and medications  Aggravating factors: standing, walking, stair climbing and lifting  Progression: worsening    Social Support  Steps to enter house: yes  Stairs in house: yes   Lives in: multiple-level home  Lives with: spouse    Employment status: not working (retired)  Hand dominance: right      Diagnostic Tests  Abnormal MRI: tear of gluteus minimus, atrophy of gluteus medius. Treatments  Previous treatment: physical therapy, medication and injection treatment  Current treatment: medication        Objective     Static Posture     Comments  Increase in RLE leg length, R knee flexed during stance, WB on lateral aspect of L foot during stance. Postural Observations  Seated posture: fair  Standing posture: fair  Correction of posture: makes symptoms worse    Additional Postural Observation Details  Minimal to moderate forward head posturing in seated and standing as well as with ambulation. Pt with increasing lumbar extension toward neutral increases pain in R lower lumbar region and R buttock lateral hip with out radiation into RLE. Palpation   Left   No palpable tenderness to the erector spinae. Right   Hypertonic in the erector spinae. Tenderness of the erector spinae. Trigger point to erector spinae.      Additional Palpation Details  Palpation of R TFL and ITB  pain reported, hypertonic  Palpation of R piriformis m hypertonic and painful    Neurological Testing     Sensation     Lumbar   Left   Intact: light touch    Right   Intact: light touch    Reflexes   Left   Patellar (L4): normal (2+)  Achilles (S1): trace (1+)    Right   Patellar (L4): normal (2+)  Achilles (S1): trace (1+)    Active Range of Motion   Cervical/Thoracic Spine       Thoracic    Flexion:  Restriction level: minimal  Extension:  with pain Restriction level: maximal  Left lateral flexion:  Restriction level: moderate  Right lateral flexion:  Restriction level: moderate  Left rotation:  Restriction level: moderate  Right rotation:  Restriction level: moderate    Lumbar   Flexion:  Restriction level: minimal  Extension:  with pain Restriction level: maximal  Left lateral flexion:  Restriction level: moderate  Right lateral flexion:  with pain Restriction level: maximal    Additional Active Range of Motion Details  Repeated movements in to lumbar extension and right side bending mcclelland create increase in pain sx in R lower lumbar region and R buttock/hip with out radiation into RLE    Strength/Myotome Testing     Left Hip   Planes of Motion   Flexion: 4+  Extension: 4  Abduction: 4  Adduction: 4+  External rotation: 4+  Internal rotation: 4+    Right Hip   Planes of Motion   Flexion: 4+  Extension: 4  Abduction: 4  Adduction: 4+  External rotation: 4+  Internal rotation: 4    Left Knee   Flexion: 4+  Extension: 4+    Right Knee   Flexion: 4+  Extension: 4+    Left Ankle/Foot   Dorsiflexion: 4+  Plantar flexion: 4+  Inversion: 4+  Eversion: 4+  Great toe flexion: 4+  Great toe extension: 4+    Right Ankle/Foot   Dorsiflexion: 4+  Plantar flexion: 4+  Eversion: 4+  Great toe flexion: 4+  Great toe extension: 4+    Additional Strength Details  TA activation fair hooklying  and seated    Tests     Lumbar     Left   Negative passive SLR. Right   Negative passive SLR. Right Hip   Positive long sit. Ambulation     Observational Gait   Gait: antalgic   Decreased walking speed, stride length and right stance time.               Precautions: Lumbar surgery, L THR,      Daily Treatment Diary:      Initial Evaluation Date: 10/24/23  Compliance 10/24                     Visit Number 1                    Re-Eval  IE                 [facility]   Foto Captured Y                           10/24                     Manual                      STM R lumbar PVMs, R piri, ITB/TFL                      IASTM same                                            Ther-Ex                      TA setting H/L 10x 5"                     LTR short arc 10x                     Hip abd w/knee ext S/L B 10x                     Th ext over roll                      Lumbar ext standing                      Seated UTR B                      Seated TA with scap retractions                                                                  nustep                      Neuro Re-Ed                      Mini squats                        Lunges with TA             Step ups/lateral                                       Ther-Act                                                               Modalities                      CP/HP R hip             TENs R hip

## 2023-10-24 NOTE — LETTER
2023    Ivelisse Fuentes MD  [facility]  [address] [facility].    Patient: Cem Nguyen   YOB: 1950   Date of Visit: 10/24/2023     Encounter Diagnosis     ICD-10-CM    1. Iliotibial band syndrome, right  M76.31       2. Piriformis syndrome, right  G57.01       3. Right lumbar radiculopathy  M54.16           Dear Dr. Mimi Rueda:    Thank you for your recent referral of Cem Nguyen. Please review the attached evaluation summary from A Porsche's recent visit. Please verify that you agree with the plan of care by signing the attached order. If you have any questions or concerns, please do not hesitate to call. I sincerely appreciate the opportunity to share in the care of one of your patients and hope to have another opportunity to work with you in the near future. Sincerely,    Alyson Alamo, PT      Referring Provider:      I certify that I have read the below Plan of Care and certify the need for these services furnished under this plan of treatment while under my care. Ivelisse Fuentes MD  [address] [facility] [address]  Via Fax: [phone]          PT Evaluation     Today's date: 10/24/2023  Patient name: Cem Nguyen  : 1950  MRN: [id number]  Referring provider: Ivelisse Fuentes MD  Dx:   Encounter Diagnosis     ICD-10-CM    1. Iliotibial band syndrome, right  M76.31       2. Piriformis syndrome, right  G57.01       3. Right lumbar radiculopathy  M54.16                      Assessment  Assessment details: 67 yo female dx with right gluteus minimus tear, fatty infiltration of gluteus medium muscle, OA of L hip, Right ITB syndrome, R piriformis syndrome with R sacroiliitis, s/p R THR and leg length discrepancy: Pt referred for physical therapy for stretching program with ambulation training. Pt with known hx of lumbar surgery, radiofrequency ablation in pain management.  Prior use of heel lift for leg length discrepency. Current amb with use of SLC limited tolerances to short distances currently secondary to pain and gait dysfunction. Prior hx of lumbar fusion L4-5 and pain provocation with lumbar extension and R SB. Pt is anticipated to benefit from skilled care to address impairments and achieve goals outlined. Impairments: abnormal gait, abnormal or restricted ROM, impaired physical strength, lacks appropriate home exercise program, pain with function, weight-bearing intolerance and poor posture   Other impairment: SLS LLE 60 seconds,  RLE 30 seconds with sway no pain increase    Symptom irritability: moderateUnderstanding of Dx/Px/POC: good   Prognosis: fair  Prognosis details: Chronic pain, tear of gluteus minimus, lumbar fusion, prior injections may impact ability to achieve rehab goals of care    Goals  Goals  STG 2-4 weeks  Increase  R hip single limb stance to 20-30 seconds to improve hip/pelvioc stability with gait  Increase  Core and hip strength by  1/2 MMT  To allow for improved ambulation tolerances and decrased hip pain, no greater than 1-2/10 amb.  40-50 min.with least AD  LTG 4-6 weeks:  Increase strength R hip and core mm by 1 MMT to allow for ambulation no to least AD 60 min with pain no greater than 2/10 R hip  Improve SLB RLE to 40-60 seconds to allow for improved endurance stability of hip/pelvis with ambulation  Patient I with final HEP and self managements to self manage and reduce likelihood of recurrence following d/c to HEP      Plan  Patient would benefit from: skilled physical therapy  Planned modality interventions: cryotherapy and unattended electrical stimulation  Planned therapy interventions: abdominal trunk stabilization, gait training, functional ROM exercises, stretching, strengthening, neuromuscular re-education, manual therapy, home exercise program, therapeutic exercise, postural training and patient education  Frequency: 2x week  Duration in weeks: 5  Plan of Care beginning date: 10/24/2023  Plan of Care expiration date: 2023  Treatment plan discussed with: patient        Subjective Evaluation    History of Present Illness  Mechanism of injury: Chronc pain in R lateral hip since about one year duration. She has had PT in the past with improvements- most recently in 3/23 at our facility. In the past, she has tried heel lifts,  AD of cane, salon pas, arnica gel, tylenol, mobic or celebrex, CP, injections in Each Hip  and lumbar spine, lumbar rhizotomies. Most recent injections into R hip were completed . Most recent rhizotomy  8/15/23 with some relief reported. Lumbar fusion L4-5 . Pt was recently to Dr. Zach Mac for assessment of R gluteus minimus tear and open repair was recommended she reports. Pt notes she is seeking another opinion from [facility]. Pt notes regression in actvity tolerances and pain increases since 2023 with out injury or known CADEN, but did travel for family vacation and had increase in pain sx. She did  note increase in sx of pain again with travel in 2023 but no injury or incident occurring. Was doing walking program at mall 30-35 minutes with [address] in winter 2023 when she felt her best and now reports inability to tolerate 30-35 minutes secondary to her pain. Pt medications, PMH and allergies reviewed in chart and with  patient today.            Recurrent probem    Quality of life: good    Patient Goals  Patient goals for therapy: decreased pain and increased strength  Patient goal: decrease pain medications to manage her pain, to stand to complete cooking, cleaning, ambulation longer and on stairs improved  Pain  Current pain ratin  At best pain ratin  At worst pain ratin  Quality: sharp, discomfort and dull ache  Relieving factors: relaxation, rest, ice and medications  Aggravating factors: standing, walking, stair climbing and lifting  Progression: worsening    Social Support  Steps to enter house: yes  Stairs in house: yes   Lives in: multiple-level home  Lives with: spouse    Employment status: not working (retired)  Hand dominance: right      Diagnostic Tests  Abnormal MRI: tear of gluteus minimus, atrophy of gluteus medius. Treatments  Previous treatment: physical therapy, medication and injection treatment  Current treatment: medication        Objective     Static Posture     Comments  Increase in RLE leg length, R knee flexed during stance, WB on lateral aspect of L foot during stance. Postural Observations  Seated posture: fair  Standing posture: fair  Correction of posture: makes symptoms worse    Additional Postural Observation Details  Minimal to moderate forward head posturing in seated and standing as well as with ambulation. Pt with increasing lumbar extension toward neutral increases pain in R lower lumbar region and R buttock lateral hip with out radiation into RLE. Palpation   Left   No palpable tenderness to the erector spinae. Right   Hypertonic in the erector spinae. Tenderness of the erector spinae. Trigger point to erector spinae.      Additional Palpation Details  Palpation of R TFL and ITB  pain reported, hypertonic  Palpation of R piriformis m hypertonic and painful    Neurological Testing     Sensation     Lumbar   Left   Intact: light touch    Right   Intact: light touch    Reflexes   Left   Patellar (L4): normal (2+)  Achilles (S1): trace (1+)    Right   Patellar (L4): normal (2+)  Achilles (S1): trace (1+)    Active Range of Motion   Cervical/Thoracic Spine       Thoracic    Flexion:  Restriction level: minimal  Extension:  with pain Restriction level: maximal  Left lateral flexion:  Restriction level: moderate  Right lateral flexion:  Restriction level: moderate  Left rotation:  Restriction level: moderate  Right rotation:  Restriction level: moderate    Lumbar   Flexion:  Restriction level: minimal  Extension:  with pain Restriction level: maximal  Left lateral flexion:  Restriction level: moderate  Right lateral flexion:  with pain Restriction level: maximal    Additional Active Range of Motion Details  Repeated movements in to lumbar extension and right side bending mcclelland create increase in pain sx in R lower lumbar region and R buttock/hip with out radiation into RLE    Strength/Myotome Testing     Left Hip   Planes of Motion   Flexion: 4+  Extension: 4  Abduction: 4  Adduction: 4+  External rotation: 4+  Internal rotation: 4+    Right Hip   Planes of Motion   Flexion: 4+  Extension: 4  Abduction: 4  Adduction: 4+  External rotation: 4+  Internal rotation: 4    Left Knee   Flexion: 4+  Extension: 4+    Right Knee   Flexion: 4+  Extension: 4+    Left Ankle/Foot   Dorsiflexion: 4+  Plantar flexion: 4+  Inversion: 4+  Eversion: 4+  Great toe flexion: 4+  Great toe extension: 4+    Right Ankle/Foot   Dorsiflexion: 4+  Plantar flexion: 4+  Eversion: 4+  Great toe flexion: 4+  Great toe extension: 4+    Additional Strength Details  TA activation fair hooklying  and seated    Tests     Lumbar     Left   Negative passive SLR. Right   Negative passive SLR. Right Hip   Positive long sit. Ambulation     Observational Gait   Gait: antalgic   Decreased walking speed, stride length and right stance time.               Precautions: Lumbar surgery, L THR,      Daily Treatment Diary:      Initial Evaluation Date: 10/24/23  Compliance 10/24                     Visit Number 1                    Re-Eval  IE                 [facility]   [employer]                           10/24                     Manual                      STM R lumbar PVMs, R piri, ITB/TFL                      IASTM same                                            Ther-Ex                      TA setting H/L 10x 5"                     LTR short arc 10x                     Hip abd w/knee ext S/L B 10x                     Th ext over roll                      Lumbar ext standing                      Seated UTR B Seated TA with scap retractions                                                                  nustep                      Neuro Re-Ed                      Mini squats                        Lunges with TA             Step ups/lateral                                       Ther-Act                                                               Modalities                      CP/HP R hip             TENs R hip

## 2023-10-26 ENCOUNTER — OFFICE VISIT (OUTPATIENT)
Dept: PHYSICAL THERAPY | Facility: CLINIC | Age: 73
End: 2023-10-26
Payer: MEDICARE

## 2023-10-26 DIAGNOSIS — M54.16 RIGHT LUMBAR RADICULOPATHY: ICD-10-CM

## 2023-10-26 DIAGNOSIS — M76.31 ILIOTIBIAL BAND SYNDROME, RIGHT: Primary | ICD-10-CM

## 2023-10-26 DIAGNOSIS — G57.01 PIRIFORMIS SYNDROME, RIGHT: ICD-10-CM

## 2023-10-26 PROCEDURE — 97110 THERAPEUTIC EXERCISES: CPT

## 2023-10-26 PROCEDURE — 97140 MANUAL THERAPY 1/> REGIONS: CPT

## 2023-10-26 NOTE — PROGRESS NOTES
Daily Note     Today's date: 10/26/2023  Patient name: Corona Eugene  : 1950  MRN: [id number]  Referring provider: La Garcia MD  Dx:   Encounter Diagnosis     ICD-10-CM    1. Iliotibial band syndrome, right  M76.31       2. Piriformis syndrome, right  G57.01       3. Right lumbar radiculopathy  M54.16                      Subjective: Patient reports some mild R sided low back and R hip pain at beginning of session. She says it was worse earlier this am, but she used the salonpas. Objective: See treatment diary below      Assessment: Tolerated treatment well without complaint. Hypertonicity of piriformis, R QL and ITB with MT. Patient would benefit from continued PT to increase R LE and trunk srength and mobility for improved function in ADLs. Plan: Continue per plan of care.       Precautions: Lumbar surgery, L THR,      Daily Treatment Diary:      Initial Evaluation Date: 10/24/23  Compliance 10/24  10/26                   Visit Number 1 2                   Re-Eval  IE                 [facility]   [employer]                           10/24  10/26                   Manual                      STM R lumbar PVMs, R piri, ITB/TFL   15'                   IASTM same                                            Ther-Ex                      TA setting H/L 10x 5"  5"x15                   LTR short arc 10x  5"x10                   Hip abd w/knee ext S/L B 10x  15x L, 10x R                                Th ext over roll                      Lumbar ext standing   5"x5                   Seated UTR B   10x ea                   Seated TA with scap retractions   5"x10                                                               nustep   L1 6 min                   Neuro Re-Ed                      Mini squats     10x                   Lunges with TA             Step ups/lateral                                       Ther-Act                                                               Modalities CP/HP R hip             TENs R hip

## 2023-10-30 ENCOUNTER — OFFICE VISIT (OUTPATIENT)
Dept: PHYSICAL THERAPY | Facility: CLINIC | Age: 73
End: 2023-10-30
Payer: MEDICARE

## 2023-10-30 DIAGNOSIS — G57.01 PIRIFORMIS SYNDROME, RIGHT: Primary | ICD-10-CM

## 2023-10-30 DIAGNOSIS — M54.16 RIGHT LUMBAR RADICULOPATHY: ICD-10-CM

## 2023-10-30 DIAGNOSIS — M76.31 ILIOTIBIAL BAND SYNDROME, RIGHT: ICD-10-CM

## 2023-10-30 PROCEDURE — 97110 THERAPEUTIC EXERCISES: CPT | Performed by: PHYSICAL THERAPIST

## 2023-10-30 PROCEDURE — 97140 MANUAL THERAPY 1/> REGIONS: CPT | Performed by: PHYSICAL THERAPIST

## 2023-10-30 NOTE — PROGRESS NOTES
Daily Note     Today's date: 10/30/2023  Patient name: Mervin Edge  : 1950  MRN: [id number]  Referring provider: Marin Mckeon MD  Dx:   Encounter Diagnosis     ICD-10-CM    1. Piriformis syndrome, right  G57.01       2. Iliotibial band syndrome, right  M76.31       3. Right lumbar radiculopathy  M54.16                      Subjective: I am more achy in the left hip and knees today related to the weather. I did feel ok leaving here last time. I got a new lift for my L shoe. Objective: See treatment diary below      Assessment: Pt  shows improved posture and symmetry in standing with new heel lift on L shoe-figures other was about one year old. Pt  notes she is pursuing appointment with hip specialist in [facility] Dr. Fely Javier to discuss surgical approach for R hip gluteus minimus tear. Tolerated treatment well. Progressed to Supine GTB resisted clam shells for further strength ex and was well tolerated. Pt. Tolerated gentelepassive stretch supine of R hip ITB into minimal flexion and cross body adduction with ROM achieved 20-25 degrees without pain or apprehension and reported it was well tolerated. Will avoid aggressive or combined stretching of R lateral hip secondary to posterolateral approach THR. Patient demonstrated fatigue post treatment and would benefit from continued PT. Tx ended with HP to R hip/ITB x 10 m seated to assist with any post tx soreness. Plan: Continue per plan of care.       Precautions: Lumbar surgery, R THR, L hip OA     Daily Treatment Diary:      Initial Evaluation Date: 10/24/23  Compliance 10/24  10/26  10/30                 Visit Number 1 2  3                 Re-Eval  IE                 [facility]                           10/24  10/26  10/30                 Manual                      STM R lumbar PVMs, R piri, ITB/TFL   15'  15'                 IASTM same                      Gentle passive ITB no combined flexion and adduction     3 x 30" supine                 Ther-Ex                      TA setting H/L 10x 5"  5"x15  5" x 15                 LTR short arc 10x  5"x10  5" x 10                 Hip abd w/knee ext S/L B 10x  15x L, 10x R  2x 10 R and L                              Th ext over roll                      Lumbar ext standing   5"x5  5" x 5                 Seated UTR B   10x ea  10x ea                 Seated TA with scap retractions   5"x10  5" x 10                                                             nustep   L1 6 min  L1 10m                 Neuro Re-Ed                      Mini squats     10x 2 x stopped sec to R knee pain                 Lunges with TA             Step ups/lateral                                       Ther-Act                                                               Modalities                      CP/HP R hip   10m          TENs R hip

## 2023-10-31 ENCOUNTER — TELEPHONE (OUTPATIENT)
Age: 73
End: 2023-10-31

## 2023-10-31 NOTE — TELEPHONE ENCOUNTER
Caller: Patient    Doctor: Cornelio Aly    Reason for call:     Patient had surgery on her right hip (total right hip) 06/01/2021, by Dr Facundo Abdi at [facility], he is retired now. She would like a second opinion, she experiencing pain, believes she has a tear in the hip and a limp when she walks. She has MRI and   Xrays from surgery. She will be dropping them off in next few weeks and after the doctor reviews would like a call if she can be seen. .    Call back#: [phone]

## 2023-11-02 ENCOUNTER — OFFICE VISIT (OUTPATIENT)
Dept: PHYSICAL THERAPY | Facility: CLINIC | Age: 73
End: 2023-11-02
Payer: MEDICARE

## 2023-11-02 DIAGNOSIS — G57.01 PIRIFORMIS SYNDROME, RIGHT: Primary | ICD-10-CM

## 2023-11-02 DIAGNOSIS — M54.16 RIGHT LUMBAR RADICULOPATHY: ICD-10-CM

## 2023-11-02 DIAGNOSIS — M76.31 ILIOTIBIAL BAND SYNDROME, RIGHT: ICD-10-CM

## 2023-11-02 PROCEDURE — 97110 THERAPEUTIC EXERCISES: CPT

## 2023-11-02 PROCEDURE — 97140 MANUAL THERAPY 1/> REGIONS: CPT

## 2023-11-02 NOTE — PROGRESS NOTES
Daily Note     Today's date: 2023  Patient name: Yovany Alejandra  : 1950  MRN: [id number]  Referring provider: Joseph Rodas MD  Dx:   Encounter Diagnosis     ICD-10-CM    1. Piriformis syndrome, right  G57.01       2. Iliotibial band syndrome, right  M76.31       3. Right lumbar radiculopathy  M54.16                      Subjective: Patient reports she has been pretty sore over past 2 days. She notes pain has not been miserable, but she has been feeling pain in the R hip more so than usual. Does not know if it is related to PT last session. Objective: See treatment diary below      Assessment: Tolerated treatment well without complaint. Minimal progressions due to subjective complaints. Patient would benefit from continued PT to increase R hip and low back strength and mobility for improved function in daily activities. Plan: Continue per plan of care.       Precautions: Lumbar surgery, R THR, L hip OA     Daily Treatment Diary:      Initial Evaluation Date: 10/24/23  Compliance 10/24  10/26  10/30  11/2               Visit Number 1 2  3  4               Re-Eval  IE                 [facility]   [employer]                           10/24  10/26  10/30  11/2               Manual                      STM R lumbar PVMs, R piri, ITB/TFL   15'  15'  15'               IASTM same                      Gentle passive ITB no combined flexion and adduction     3 x 30" supine  3 x 30" supine               Ther-Ex                      TA setting H/L 10x 5"  5"x15  5" x 15  5"x15               LTR short arc 10x  5"x10  5" x 10  5"x10               Hip abd w/knee ext S/L B 10x  15x L, 10x R  2x 10 R and L  2x10 B                            Th ext over roll                      Lumbar ext standing   5"x5  5" x 5  5"x10 over counter               Seated UTR B   10x ea  10x ea                 Seated TA with scap retractions   5"x10  5" x 10  5"x10                                                           nustep L1 6 min  L1 10m  L2 10 min               Neuro Re-Ed                      Mini squats     10x 2 x stopped sec to R knee pain  held               Lunges with TA             Step ups/lateral                                       Ther-Act                                                               Modalities                      CP/HP R hip   10m 10min          TENs R hip

## 2023-11-06 ENCOUNTER — OFFICE VISIT (OUTPATIENT)
Dept: PHYSICAL THERAPY | Facility: CLINIC | Age: 73
End: 2023-11-06
Payer: MEDICARE

## 2023-11-06 DIAGNOSIS — G57.01 PIRIFORMIS SYNDROME, RIGHT: Primary | ICD-10-CM

## 2023-11-06 DIAGNOSIS — M54.16 RIGHT LUMBAR RADICULOPATHY: ICD-10-CM

## 2023-11-06 DIAGNOSIS — M76.31 ILIOTIBIAL BAND SYNDROME, RIGHT: ICD-10-CM

## 2023-11-06 PROCEDURE — 97110 THERAPEUTIC EXERCISES: CPT

## 2023-11-06 PROCEDURE — 97140 MANUAL THERAPY 1/> REGIONS: CPT

## 2023-11-06 NOTE — PROGRESS NOTES
Daily Note     Today's date: 2023  Patient name: Kathie Lea  : 1950  MRN: [id number]  Referring provider: Saúl Rogel MD  Dx:   Encounter Diagnosis     ICD-10-CM    1. Piriformis syndrome, right  G57.01       2. Iliotibial band syndrome, right  M76.31       3. Right lumbar radiculopathy  M54.16           Start Time: 1115  Stop Time: 1210  Total time in clinic (min): 55 minutes    Subjective: Patient reports mornings a pretty rough until she starts going. Objective: See treatment diary below      Assessment: Tolerated treatment well without new complaints or concerns. Patient would benefit from continued PT to increase R hip and low back strength and mobility for improved function in daily activities. Plan: Continue per plan of care.       Precautions: Lumbar surgery, R THR, L hip OA     Daily Treatment Diary:      Initial Evaluation Date: 10/24/23  Compliance 10/24  10/26  10/30  11/2  11/6             Visit Number 1 2  3  4  5             Re-Eval  IE                 [facility]   [employer] Y                           10/24  10/26  10/30  11/2  11/6             Manual                      STM R lumbar PVMs, R piri, ITB/TFL   15'  15'  15'  KL             IASTM same                      Gentle passive ITB no combined flexion and adduction     3 x 30" supine  3 x 30" supine  3 x 30" supine             Ther-Ex                      TA setting H/L 10x 5"  5"x15  5" x 15  5"x15  5"x15             LTR short arc 10x  5"x10  5" x 10  5"x10  5"x10             Hip abd w/knee ext S/L B 10x  15x L, 10x R  2x 10 R and L  2x10 B   2x10 B                          Th ext over roll                      Lumbar ext standing   5"x5  5" x 5  5"x10 over counter  5"x10 over counter             Seated UTR B   10x ea  10x ea                 Seated TA with scap retractions   5"x10  5" x 10  5"x10  5"x10                                                         nustep   L1 6 min  L1 10m  L2 10 min   L2 10 min Neuro Re-Ed                      Mini squats     10x 2 x stopped sec to R knee pain  held               Lunges with TA             Step ups/lateral                                       Ther-Act                                                               Modalities                      CP/HP R hip   10m 10min  5 mins        TENs R hip

## 2023-11-08 NOTE — TELEPHONE ENCOUNTER
Called pt and lvm. Let her know she should not see Dr. Abdiel Rios for this injury, but instead a total joint specialist. I specified that Dr. Walda Litten in [facility] would most likely be the best doctor for her to see as she lives in [place]. I included a good number for her to call back to schedule with Dr. Walda Litten if she would like.

## 2023-11-09 ENCOUNTER — OFFICE VISIT (OUTPATIENT)
Dept: PHYSICAL THERAPY | Facility: CLINIC | Age: 73
End: 2023-11-09
Payer: MEDICARE

## 2023-11-09 DIAGNOSIS — M54.16 RIGHT LUMBAR RADICULOPATHY: ICD-10-CM

## 2023-11-09 DIAGNOSIS — M76.31 ILIOTIBIAL BAND SYNDROME, RIGHT: ICD-10-CM

## 2023-11-09 DIAGNOSIS — G57.01 PIRIFORMIS SYNDROME, RIGHT: Primary | ICD-10-CM

## 2023-11-09 PROCEDURE — 97110 THERAPEUTIC EXERCISES: CPT | Performed by: PHYSICAL THERAPIST

## 2023-11-09 PROCEDURE — 97140 MANUAL THERAPY 1/> REGIONS: CPT | Performed by: PHYSICAL THERAPIST

## 2023-11-09 NOTE — PROGRESS NOTES
Daily Note     Today's date: 2023  Patient name: Taylor Corey  : 1950  MRN: [id number]  Referring provider: Elana Cole MD  Dx:   Encounter Diagnosis     ICD-10-CM    1. Piriformis syndrome, right  G57.01       2. Iliotibial band syndrome, right  M76.31       3. Right lumbar radiculopathy  M54.16                      Subjective: " I am doing well  today and have no pain. I am walking better. The new lift in shoe is helping. I am getting stronger and more flexible"      Objective: See treatment diary below      Assessment: Tolerated treatment well. Including progression to L hip and R hip PREs - completed in standing to R hip. Pt. Was unable to complete more than 5 reps of R hip abduction with knee extended in S/L secondary to hip muscle strength deficit. She did complete 3 x 10 in standing with 2# at ankle with fatigue and no pain but onset of L hip pain limited continued standing ex for R hip. Pt. Limited with standing tolerance for Scap retr with Tband resistance with posture cues to 10 reps secondary to fatigue in lower back demonstrating weight shift to LLE, dec WB On RLE at sx onset. Patient follows with her PCP today. She is scheduling appointment with a THR specialist of [facility] for another opinion. She has made slow but steady progress toward goals of care. Back pain, L hip pain and R hip strength deficits are limiting for progression at times. Pt R ITB flexibility assessed supine is good. Mild tenderness about mid to distal ITB. No specific pain  with palpation fo R lateral hip and mild tenderness noted R gluteal mm. , R  lumbar PVMs. Plan: Continue per plan of care.       Precautions: Lumbar surgery, R THR, L hip OA     Daily Treatment Diary:      Initial Evaluation Date: 10/24/23  Compliance 10/24  10/26  10/30  11/2  11/6  11/9           Visit Number 1 2  3  4  5  6           Re-Trip  IE                 [facility]   Foto Captured Y                           10/24  10/26  10/30  11/2 11/6 11/9           Manual                      STM R lumbar PVMs, R piri, ITB/TFL   15'  15'  15'  KL  10 PC           IASTM same                      Gentle passive ITB no combined flexion and adduction     3 x 30" supine  3 x 30" supine  3 x 30" supine  3 x 30" supine           Ther-Ex                      TA setting H/L 10x 5"  5"x15  5" x 15  5"x15  5"x15  10x 2" dead bugs           LTR short arc 10x  5"x10  5" x 10  5"x10  5"x10  5" x 10           Hip abd w/knee ext S/L B 10x  15x L, 10x R  2x 10 R and L  2x10 B   2x10 B  3x10 R only  2#                 Fire hydrants  1# 2 x 10 L S/L       Th ext over roll                      Lumbar ext standing   5"x5  5" x 5  5"x10 over counter  5"x10 over counter  nc           Seated UTR B   10x ea  10x ea      10x 5"           Seated TA with scap retractions   5"x10  5" x 10  5"x10  5"x10  standing red tband 10x 5" cues for equal weight distrib           B BLE bridge           U bridges 10x r and L                                 nustep   L1 6 min  L1 10m  L2 10 min   L2 10 min  L 2 10           Neuro Re-Ed                      Mini squats     10x 2 x stopped sec to R knee pain  held               Lunges with TA             Step ups/lateral                                       Ther-Act                                                               Modalities                      CP/HP R hip   10m 10min  5 mins 10m HP R hip       TENs R hip

## 2023-11-13 ENCOUNTER — APPOINTMENT (OUTPATIENT)
Dept: PHYSICAL THERAPY | Facility: CLINIC | Age: 73
End: 2023-11-13
Payer: MEDICARE

## 2023-11-16 ENCOUNTER — OFFICE VISIT (OUTPATIENT)
Dept: PHYSICAL THERAPY | Facility: CLINIC | Age: 73
End: 2023-11-16
Payer: MEDICARE

## 2023-11-16 DIAGNOSIS — M54.16 RIGHT LUMBAR RADICULOPATHY: ICD-10-CM

## 2023-11-16 DIAGNOSIS — G57.01 PIRIFORMIS SYNDROME, RIGHT: Primary | ICD-10-CM

## 2023-11-16 DIAGNOSIS — M76.31 ILIOTIBIAL BAND SYNDROME, RIGHT: ICD-10-CM

## 2023-11-16 PROCEDURE — 97140 MANUAL THERAPY 1/> REGIONS: CPT

## 2023-11-16 PROCEDURE — 97110 THERAPEUTIC EXERCISES: CPT

## 2023-11-16 NOTE — PROGRESS NOTES
Daily Note     Today's date: 2023  Patient name: Safia Perrin  : 1950  MRN: [id number]  Referring provider: Linda Snyder MD  Dx:   Encounter Diagnosis     ICD-10-CM    1. Piriformis syndrome, right  G57.01       2. Iliotibial band syndrome, right  M76.31       3. Right lumbar radiculopathy  M54.16                      Subjective: Patient notes pain in her back has been more so than the R hip pain. She note back pain is "like a puppy." Stating it always lets her know it is there lately. She notes her discomfort is worse with prolonged standing, which she has been doing with getting ready for the holidays. Objective: See treatment diary below      Assessment: Tolerated treatment well without complaint. Patient would benefit from continued PT to increase trunk and LE strength and mobility for improved function and activity tolerance. Plan: Continue per plan of care.       Precautions: Lumbar surgery, R THR, L hip OA     Daily Treatment Diary:      Initial Evaluation Date: 10/24/23  Compliance 10/24  10/26  10/30  11/2  11/6  11/9  11/16         Visit Number 1 2  3  4  5  6  7         Re-Eval  [address] Captured Y           Y                10/24  10/26  10/30  11/2  11/6  11/9  11/16         Manual                      STM R lumbar PVMs, R piri, ITB/TFL   15'  15'  15'  KL  10 PC  10'         IASTM same                      Gentle passive ITB no combined flexion and adduction     3 x 30" supine  3 x 30" supine  3 x 30" supine  3 x 30" supine  3 x 30" supine         Ther-Ex                      TA setting H/L 10x 5"  5"x15  5" x 15  5"x15  5"x15  10x 2" dead bugs  10x 2" dead bugs         LTR short arc 10x  5"x10  5" x 10  5"x10  5"x10  5" x 10 5"x10         Hip abd w/knee ext S/L B 10x  15x L, 10x R  2x 10 R and L  2x10 B   2x10 B  3x10 R only  2#  2# 2x10 ea stand               Fire hydrants  1# 2 x 10 L S/L Fire hydrants  1# 2 x 10 L S/L      Th ext over roll Lumbar ext standing   5"x5  5" x 5  5"x10 over counter  5"x10 over counter  nc           Seated UTR B   10x ea  10x ea      10x 5"  10x 5"         Seated TA with scap retractions   5"x10  5" x 10  5"x10  5"x10  standing red tband 10x 5" cues for equal weight distrib  standing red tband 15x 5" cues for equal weight distrib         B BLE bridge           U bridges 10x r and L  10x ea U bridge                               nustep   L1 6 min  L1 10m  L2 10 min   L2 10 min  L 2 10  L2 10 min         Neuro Re-Ed                      Mini squats     10x 2 x stopped sec to R knee pain  held               Lunges with TA             Step ups/lateral                                       Ther-Act                                                               Modalities                      CP/HP R hip   10m 10min  5 mins 10m HP R hip Cp 10 min post      TENs R hip

## 2023-11-20 ENCOUNTER — OFFICE VISIT (OUTPATIENT)
Dept: PHYSICAL THERAPY | Facility: CLINIC | Age: 73
End: 2023-11-20
Payer: MEDICARE

## 2023-11-20 DIAGNOSIS — G57.01 PIRIFORMIS SYNDROME, RIGHT: Primary | ICD-10-CM

## 2023-11-20 DIAGNOSIS — M76.31 ILIOTIBIAL BAND SYNDROME, RIGHT: ICD-10-CM

## 2023-11-20 DIAGNOSIS — M54.16 RIGHT LUMBAR RADICULOPATHY: ICD-10-CM

## 2023-11-20 PROCEDURE — 97110 THERAPEUTIC EXERCISES: CPT

## 2023-11-20 PROCEDURE — 97140 MANUAL THERAPY 1/> REGIONS: CPT

## 2023-11-20 NOTE — PROGRESS NOTES
Daily Note     Today's date: 2023  Patient name: Jenkins Kussmaul  : 1950  MRN: [id number]  Referring provider: Gerard Ramírez MD  Dx:   Encounter Diagnosis     ICD-10-CM    1. Piriformis syndrome, right  G57.01       2. Iliotibial band syndrome, right  M76.31       3. Right lumbar radiculopathy  M54.16                      Subjective: Patient notes some soreness in her thoracic area. She states her hip and low back have not been too bad recently. She says "it's just about getting the right mechanics now."      Objective: See treatment diary below      Assessment: Tolerated treatment well without complaint. Patient would benefit from continued PT to increase overall strength and mobility for improved function and activity tolerance. Plan: Continue per plan of care.       Precautions: Lumbar surgery, R THR, L hip OA     Daily Treatment Diary:      Initial Evaluation Date: 10/24/23  Compliance 10/24  10/26  10/30  11/2  11/6  11/9  11/16  11/20       Visit Number 1 2  3  4  5  6  7  8       Re-Eval  IE                 [address] [facility] Captured Y           Y                10/24  10/26  10/30  11/2  11/6  11/9  11/16  11/20       Manual                      STM R lumbar PVMs, R piri, ITB/TFL   15'  15'  15'  KL  10 PC  10'  thoracic 10 min       IASTM same                      Gentle passive ITB no combined flexion and adduction     3 x 30" supine  3 x 30" supine  3 x 30" supine  3 x 30" supine  3 x 30" supine  3 x 30" supine       Ther-Ex                      TA setting H/L 10x 5"  5"x15  5" x 15  5"x15  5"x15  10x 2" dead bugs  10x 2" dead bugs  20x 2" dead bugs       LTR short arc 10x  5"x10  5" x 10  5"x10  5"x10  5" x 10 5"x10  5"x10       Hip abd w/knee ext S/L B 10x  15x L, 10x R  2x 10 R and L  2x10 B   2x10 B  3x10 R only  2#  2# 2x10 ea stand               Fire hydrants  1# 2 x 10 L S/L Fire hydrants  1# 2 x 10 L S/L Fire hydrants  1# 2 x 10 L S/L     Th ext over roll               open book 5"x10 ea       Lumbar ext standing   5"x5  5" x 5  5"x10 over counter  5"x10 over counter  nc           Seated UTR B   10x ea  10x ea      10x 5"  10x 5"  5"x10       Seated TA with scap retractions   5"x10  5" x 10  5"x10  5"x10  standing red tband 10x 5" cues for equal weight distrib  standing red tband 15x 5" cues for equal weight distrib  np       B BLE bridge           U bridges 10x r and L  10x ea U bridge  10x ea U bridge                             nustep   L1 6 min  L1 10m  L2 10 min   L2 10 min  L 2 10  L2 10 min  np       Neuro Re-Ed                      Mini squats     10x 2 x stopped sec to R knee pain  held               Ta c hip add        5"X20     TA c hip abd        GTB 5"x20     Lunges with TA             Step ups/lateral                                       Ther-Act                                                               Modalities                      CP/HP R hip   10m 10min  5 mins 10m HP R hip Cp 10 min post Cp 10 min post     TENs R hip

## 2023-11-28 ENCOUNTER — APPOINTMENT (OUTPATIENT)
Dept: PHYSICAL THERAPY | Facility: CLINIC | Age: 73
End: 2023-11-28
Payer: MEDICARE

## 2023-11-30 ENCOUNTER — OFFICE VISIT (OUTPATIENT)
Dept: PHYSICAL THERAPY | Facility: CLINIC | Age: 73
End: 2023-11-30
Payer: MEDICARE

## 2023-11-30 DIAGNOSIS — M76.31 ILIOTIBIAL BAND SYNDROME, RIGHT: ICD-10-CM

## 2023-11-30 DIAGNOSIS — M54.16 RIGHT LUMBAR RADICULOPATHY: ICD-10-CM

## 2023-11-30 DIAGNOSIS — G57.01 PIRIFORMIS SYNDROME, RIGHT: Primary | ICD-10-CM

## 2023-11-30 PROCEDURE — 97140 MANUAL THERAPY 1/> REGIONS: CPT

## 2023-11-30 PROCEDURE — 97110 THERAPEUTIC EXERCISES: CPT

## 2023-11-30 NOTE — PROGRESS NOTES
Daily Note     Today's date: 2023  Patient name: Linda Rodrigez  : 1950  MRN: [id number]  Referring provider: Kimberly March MD  Dx:   Encounter Diagnosis     ICD-10-CM    1. Piriformis syndrome, right  G57.01       2. Iliotibial band syndrome, right  M76.31       3. Right lumbar radiculopathy  M54.16                      Subjective: Patient reports R hip and mid/low back are about the same. Objective: See treatment diary below      Assessment: Tolerated treatment well without complaint. Patient would benefit from continued PT to increase mobility and strength for improved function in ADLs. Plan: Continue per plan of care.       Precautions: Lumbar surgery, R THR, L hip OA     Daily Treatment Diary:      Initial Evaluation Date: 10/24/23  Compliance 10/24  10/26  10/30  11/2  11/6  11/9  11/16  11/20  11/30     Visit Number 1 2  3  4  5  6  7  8  9     Re-Eval  IE                 [facility]   Foto Captured Y           Y                10/24  10/26  10/30  11/2  11/6  11/9  11/16  11/20  11/30     Manual                      STM R lumbar PVMs, R piri, ITB/TFL   15'  15'  15'  KL  10 PC  10'  thoracic 10 min  R glutes/piri and thoracic 15 min     IASTM same                      Gentle passive ITB no combined flexion and adduction     3 x 30" supine  3 x 30" supine  3 x 30" supine  3 x 30" supine  3 x 30" supine  3 x 30" supine  -     Ther-Ex                      TA setting H/L 10x 5"  5"x15  5" x 15  5"x15  5"x15  10x 2" dead bugs  10x 2" dead bugs  20x 2" dead bugs  20x 2" dead bugs     LTR short arc 10x  5"x10  5" x 10  5"x10  5"x10  5" x 10 5"x10  5"x10  5"x10     Hip abd w/knee ext S/L B 10x  15x L, 10x R  2x 10 R and L  2x10 B   2x10 B  3x10 R only  2#  2# 2x10 ea stand               Fire hydrants  1# 2 x 10 L S/L Fire hydrants  1# 2 x 10 L S/L Fire hydrants  1# 2 x 10 L S/L S/L clams 2x10 Fire hydrants 2 x 10 L S/L    Th ext over roll               open book 5"x10 ea  open book 5"x10 ea Lumbar ext standing   5"x5  5" x 5  5"x10 over counter  5"x10 over counter  nc           Seated UTR B   10x ea  10x ea      10x 5"  10x 5"  5"x10  5"x10     Seated TA with scap retractions   5"x10  5" x 10  5"x10  5"x10  standing red tband 10x 5" cues for equal weight distrib  standing red tband 15x 5" cues for equal weight distrib  np       B BLE bridge           U bridges 10x r and L  10x ea U bridge  10x ea U bridge  10x ea U bridge                           nustep   L1 6 min  L1 10m  L2 10 min   L2 10 min  L 2 10  L2 10 min  np       Neuro Re-Ed                      Mini squats     10x 2 x stopped sec to R knee pain  held               Ta c hip add        5"X20 5"x20    TA c hip abd        GTB 5"x20 GTB 5"x20    Lunges with TA             Step ups/lateral                                       Ther-Act                                                               Modalities                      CP/HP R hip   10m 10min  5 mins 10m HP R hip Cp 10 min post Cp 10 min post Cp 10m post    TENs R hip

## 2023-12-05 ENCOUNTER — EVALUATION (OUTPATIENT)
Dept: PHYSICAL THERAPY | Facility: CLINIC | Age: 73
End: 2023-12-05
Payer: MEDICARE

## 2023-12-05 DIAGNOSIS — M54.16 RIGHT LUMBAR RADICULOPATHY: ICD-10-CM

## 2023-12-05 DIAGNOSIS — M76.31 ILIOTIBIAL BAND SYNDROME, RIGHT: ICD-10-CM

## 2023-12-05 DIAGNOSIS — G57.01 PIRIFORMIS SYNDROME, RIGHT: Primary | ICD-10-CM

## 2023-12-05 PROCEDURE — 97110 THERAPEUTIC EXERCISES: CPT | Performed by: PHYSICAL THERAPIST

## 2023-12-05 PROCEDURE — 97112 NEUROMUSCULAR REEDUCATION: CPT | Performed by: PHYSICAL THERAPIST

## 2023-12-05 PROCEDURE — 97140 MANUAL THERAPY 1/> REGIONS: CPT | Performed by: PHYSICAL THERAPIST

## 2023-12-05 NOTE — LETTER
2023    Zen Cody MD  [facility]  [address] [facility].    Patient: Ce Messina   YOB: 1950   Date of Visit: 2023     Encounter Diagnosis     ICD-10-CM    1. Piriformis syndrome, right  G57.01       2. Iliotibial band syndrome, right  M76.31       3. Right lumbar radiculopathy  M54.16           Dear Dr. Leatha العراقي:    Thank you for your recent referral of Ce Messina. Please review the attached evaluation summary from A Porsche's recent visit. Please verify that you agree with the plan of care by signing the attached order. If you have any questions or concerns, please do not hesitate to call. I sincerely appreciate the opportunity to share in the care of one of your patients and hope to have another opportunity to work with you in the near future. Sincerely,    Valentín Babin, PT      Referring Provider:      I certify that I have read the below Plan of Care and certify the need for these services furnished under this plan of treatment while under my care. Zen Cody MD  [address] [facility] [address]  Via Fax: [phone]                                                                                            PT RE-EVALUATION    Today's date: 2023  Patient name: Ce Messina  : 1950  MRN: [id number]  Referring provider: Zen Cody MD  Dx:   Encounter Diagnosis     ICD-10-CM    1. Piriformis syndrome, right  G57.01       2. Iliotibial band syndrome, right  M76.31       3. Right lumbar radiculopathy  M54.16                      Assessment  Assessment details: 67 yo female dx with right gluteus minimus tear, fatty infiltration of gluteus medium muscle, OA of L hip, Right ITB syndrome, R piriformis syndrome with R sacroiliitis, s/p R THR and leg length discrepancy: Pt atttending  physical therapy for stretching  strengthening program with ambulation training.  Pt with known hx of lumbar surgery, radiofrequency ablation in pain management. Prior and current use of heel lift for leg length discrepency. Current amb with use of SLC limited tolerances to short distances currently secondary to pain and gait dysfunction. Prior hx of lumbar fusion L4-5 and pain provocation with lumbar extension and R SB. Pt is anticipated to benefit from skilled care to address impairments and achieve goals outlined. Pt R hip abduction strength 4-/5 to 4/5. Gait dysfunction of dec. Stance time RLE, antalgic gait. POP of R ITB< piriformis and gluteus medius m. Impairments: abnormal gait, abnormal or restricted ROM, impaired physical strength, lacks appropriate home exercise program, pain with function, weight-bearing intolerance and poor posture   Other impairment: SLS LLE 60 seconds,  RLE 20 seconds with sway no pain increase    Symptom irritability: moderateUnderstanding of Dx/Px/POC: good   Prognosis: fair  Prognosis details: Chronic pain, tear of gluteus minimus, lumbar fusion, prior injections may impact ability to achieve rehab goals of care    Goals  Goals  STG 2-4 weeks  Increase  R hip single limb stance to 20-30 seconds to improve hip/pelvioc stability with gait-  plateaued at  79-25 seconds  Increase  Core and hip strength by  1/2 MMT  To allow for improved ambulation tolerances and decrased hip pain, no greater than 1-2/10 amb. 40-50 min.with least AD- not achieved  LTG 4-6 weeks:  Increase strength R hip and core mm by 1 MMT to allow for ambulation no to least AD 60 min with pain no greater than 2/10 R hip no t achieved  Improve SLB RLE to 40-60 seconds to allow for improved endurance stability of hip/pelvis with ambulation not achieved- progressing  Patient I with final HEP and self managements to self manage and reduce likelihood of recurrence following d/c to HEP      Plan  Plan details: Care to cont as directed by Dr. La Kamara following assessment 12/7/23.    Patient would benefit from: skilled physical therapy  Planned modality interventions: cryotherapy and unattended electrical stimulation  Planned therapy interventions: abdominal trunk stabilization, gait training, functional ROM exercises, stretching, strengthening, neuromuscular re-education, manual therapy, home exercise program, therapeutic exercise, postural training and patient education  Frequency: 2x week  Duration in weeks: 5  Plan of Care beginning date: 11/28/2023  Plan of Care expiration date: 1/2/2024  Treatment plan discussed with: patient        Subjective Evaluation    History of Present Illness  Mechanism of injury:  Pt notes she feels some plateau in progress since initial levels with onset of care in PT. She notes she is compliant with her HEP most days. She notes difficulty with standing tolerances, walking tolerances, stair climbing and functional WB. Sh enotes pian is worse in the AM OOB and improves with use of medications. Pt. Notes pain persists in R posterolateral hip which increases with increase in weight bearing activity. She notes decrease in motivation for ambulation as her hip fatigues and gets sore even with use of cane. She notes not being able to participate with decorating  and homekeeping, family outings as she would like. She notes thoracic and L hip pain  and at times lumbar pain, all do contribute to her decrease in activity tolerances but feels the R hip is the primary reason for her decrease in WB function. She is seeking care from Dr. Christiano Moseley Thursday to learn /discuss if a surgical approach is an option to improve her function. Pt notes she continues to use heel lift,  AD of cane, salon pas, arnica gel, tylenol, mobic or celebrex, CP. No recent  injections in either hip. But + PMH Of injections. Pt medications, PMH and allergies reviewed in chart and with  patient today.            Recurrent probem    Quality of life: good    Patient Goals  Patient goals for therapy: decreased pain and increased strength  Patient goal: decrease pain medications to manage her pain, to stand to complete cooking, cleaning, ambulation longer and on stairs improved  Pain  Current pain ratin  At best pain ratin  At worst pain ratin  Location: R buttock, gluteal region and lateral hip, at times thoracic  and lumbar regions  Quality: sharp, discomfort and dull ache  Relieving factors: relaxation, rest, ice and medications  Aggravating factors: standing, walking, stair climbing and lifting  Progression: worsening    Social Support  Steps to enter house: yes  Stairs in house: yes   Lives in: multiple-level home  Lives with: spouse    Employment status: not working (retired)  Hand dominance: right      Diagnostic Tests  Abnormal MRI: tear of gluteus minimus, atrophy of gluteus medius. Treatments  Previous treatment: physical therapy, medication and injection treatment  Current treatment: medication        Objective     Static Posture     Comments  Increase in RLE leg length, R knee flexed during stance, WB on lateral aspect of L foot during stance. Postural Observations  Seated posture: fair  Standing posture: fair  Correction of posture: makes symptoms worse    Additional Postural Observation Details  Minimal to moderate forward head posturing in seated and standing as well as with ambulation. Pt with increasing lumbar extension toward neutral increases pain in R lower lumbar region and R buttock lateral hip with out radiation into RLE. Palpation   Left   No palpable tenderness to the erector spinae. Right   Hypertonic in the erector spinae. Tenderness of the erector spinae. Trigger point to erector spinae.      Additional Palpation Details  Palpation of R TFL and ITB  pain reported, hypertonic  Palpation of R piriformis m hypertonic and painful    Neurological Testing     Sensation     Lumbar   Left   Intact: light touch    Right   Intact: light touch    Reflexes   Left   Patellar (L4): normal (2+)  Achilles (S1): trace (1+)    Right   Patellar (L4): normal (2+)  Achilles (S1): trace (1+)    Active Range of Motion   Cervical/Thoracic Spine       Thoracic    Flexion:  Restriction level: minimal  Extension:  with pain Restriction level: maximal  Left lateral flexion:  Restriction level: moderate  Right lateral flexion:  Restriction level: moderate  Left rotation:  Restriction level: moderate  Right rotation:  Restriction level: moderate    Lumbar   Flexion:  Restriction level: minimal  Extension:  with pain Restriction level: maximal  Left lateral flexion:  Restriction level: moderate  Right lateral flexion:  with pain Restriction level: maximal    Passive Range of Motion     Additional Passive Range of Motion Details  Flexibility of R ITB  WFLs without pain provocation R hip , LE    Strength/Myotome Testing     Left Hip   Planes of Motion   Flexion: 4+  Extension: 4  Abduction: 4  Adduction: 4+  External rotation: 4+  Internal rotation: 4+    Right Hip   Planes of Motion   Flexion: 4+  Extension: 4  Abduction: 4 (4-/5 to 4/5 , dec in endurance)  Adduction: 4+  External rotation: 4+  Internal rotation: 4    Left Knee   Flexion: 4+  Extension: 4+    Right Knee   Flexion: 4+  Extension: 4+    Left Ankle/Foot   Dorsiflexion: 4+  Plantar flexion: 4+  Inversion: 4+  Eversion: 4+  Great toe flexion: 4+  Great toe extension: 4+    Right Ankle/Foot   Dorsiflexion: 4+  Plantar flexion: 4+  Eversion: 4+  Great toe flexion: 4+  Great toe extension: 4+    Additional Strength Details  TA activation fair to good  hooklying  and seated    Tests     Lumbar     Left   Negative passive SLR. Right   Negative passive SLR. Right Hip   Positive long sit. Ambulation     Observational Gait   Gait: antalgic   Decreased walking speed, stride length and right stance time.      Additional Observational Gait Details  12/5/23- use of SLC with RUE no arm swing, no trunk rotation           Precautions: Lumbar surgery, R THR, L hip OA     Daily Treatment Diary:      Initial Evaluation Date: 10/24/23                                       POC 1/2/2024  Compliance 10/24  10/26  10/30  11/2  11/6  11/9  11/16  11/20  11/30  12/5   Visit Number 1 2  3  4  5  6  7  8  9  10   Re-Eval  IE                 [facility]   [employer] Captured [place]          10/24  10/26  10/30  11/2  11/6  11/9  11/16  11/20  11/30 12/5   Manual                      STM R lumbar PVMs, R piri, ITB/TFL   15'  15'  15'  KL  10 PC  10'  thoracic 10 min  R glutes/piri and thoracic 15 min  R Glut, piri, ITB   IASTM same                      Gentle passive ITB no combined flexion and adduction     3 x 30" supine  3 x 30" supine  3 x 30" supine  3 x 30" supine  3 x 30" supine  3 x 30" supine  -  3 x 30     Ther-Ex                      TA setting H/L 10x 5"  5"x15  5" x 15  5"x15  5"x15  10x 2" dead bugs  10x 2" dead bugs  20x 2" dead bugs  20x 2" dead bugs  20x2"  Dead bugs   LTR short arc 10x  5"x10  5" x 10  5"x10  5"x10  5" x 10 5"x10  5"x10  5"x10  5"x10   Hip abd w/knee ext S/L B 10x  15x L, 10x R  2x 10 R and L  2x10 B   2x10 B  3x10 R only  2#  2# 2x10 ea stand               Fire hydrants  1# 2 x 10 L S/L Fire hydrants  1# 2 x 10 L S/L Fire hydrants  1# 2 x 10 L S/L S/L clams 2x10 Fire hydrants 2 x 10 L S/L -   Th ext over roll               open book 5"x10 ea  open book 5"x10 ea D/c   Lumbar ext standing   5"x5  5" x 5  5"x10 over counter  5"x10 over counter  nc           Seated UTR B   10x ea  10x ea      10x 5"  10x 5"  5"x10  5"x10     Seated TA with scap retractions   5"x10  5" x 10  5"x10  5"x10  standing red tband 10x 5" cues for equal weight distrib  standing red tband 15x 5" cues for equal weight distrib  np       B BLE bridge           U bridges 10x r and L  10x ea U bridge  10x ea U bridge  10x ea U bridge  2 x 10 RLE bridge with TA                         nustep   L1 6 min  L1 10m  L2 10 min   L2 10 min  L 2 10  L2 10 min  np       Neuro Re-Ed Mini squats     10x 2 x stopped sec to R knee pain  held            SLB Each LE 3 x 10-20 seconds HR A   Ta c hip add        5"X20 5"x20 Ball sqz 5" x 2m   TA c hip abd        GTB 5"x20 GTB 5"x20 Pilates [place] 5" x 20   Lunges with TA             Step ups/lateral          6" 10 x each LE-noted R knee pain as limiting                             Ther-Act                                                               Modalities                      CP/HP R hip   10m 10min  5 mins 10m HP R hip Cp 10 min post Cp 10 min post Cp 10m post CP 10 m post   TENs R hip

## 2023-12-06 ENCOUNTER — TELEPHONE (OUTPATIENT)
Dept: OBGYN CLINIC | Facility: HOSPITAL | Age: 73
End: 2023-12-06

## 2023-12-07 ENCOUNTER — OFFICE VISIT (OUTPATIENT)
Dept: OBGYN CLINIC | Facility: MEDICAL CENTER | Age: 73
End: 2023-12-07
Payer: MEDICARE

## 2023-12-07 ENCOUNTER — APPOINTMENT (OUTPATIENT)
Dept: RADIOLOGY | Facility: MEDICAL CENTER | Age: 73
End: 2023-12-07
Payer: MEDICARE

## 2023-12-07 VITALS
BODY MASS INDEX: 31.55 KG/M2 | SYSTOLIC BLOOD PRESSURE: 124 MMHG | WEIGHT: 201 LBS | HEIGHT: 67 IN | HEART RATE: 91 BPM | DIASTOLIC BLOOD PRESSURE: 79 MMHG

## 2023-12-07 DIAGNOSIS — M25.551 RIGHT HIP PAIN: ICD-10-CM

## 2023-12-07 DIAGNOSIS — S76.019A RUPTURE OF HIP ABDUCTOR TENDON: Primary | ICD-10-CM

## 2023-12-07 DIAGNOSIS — M46.1 SACROILIITIS (HCC): ICD-10-CM

## 2023-12-07 DIAGNOSIS — T84.84XA PAINFUL ORTHOPAEDIC HARDWARE (HCC): ICD-10-CM

## 2023-12-07 PROCEDURE — 73502 X-RAY EXAM HIP UNI 2-3 VIEWS: CPT

## 2023-12-07 PROCEDURE — 99204 OFFICE O/P NEW MOD 45 MIN: CPT | Performed by: STUDENT IN AN ORGANIZED HEALTH CARE EDUCATION/TRAINING PROGRAM

## 2023-12-07 NOTE — PROGRESS NOTES
Hip New Office Note    Assessment:     1. Rupture of hip abductor tendon    2. Right hip pain    3. Sacroiliitis ([address])    4. Painful orthopaedic hardware ([address])        Plan:   Diagnoses and all orders for this visit:    Rupture of hip abductor tendon    Right hip pain  -     XR hip/pelv 2-3 vws right if performed; Future  -     Cancel: MRI hip right wo contrast; Future  -     Ambulatory Referral to Physical Therapy; Future  -     Sedimentation rate, automated; Future  -     C-reactive protein; Future  -     MRI hip right wo contrast; Future    Sacroiliitis ([address])    Painful orthopaedic hardware ([address])      MRI from 2/23 reviewed with abductor tear. No significant fatty infiltration of glut med/minimus musculature Plan for MRI and possible hip abductor tendon repair based on results. Repeat MRI for grading of fatty infiltration to predict success or repair. Follow up with MRI. PT script provided     Subjective:     Patient ID: RACHNA Olivas is a 68 y.o. female. Chief Complaint: right hip pain  HPI: Right hip pain since her right hip replacement in 2021 with Dr. Bobbi Chavez. She had pain in groin for long time before hip replacement. The hip replacement did help her but about 6 months afterwards she had onset right hip pain laterally based without significant radiation or numbness or tingling. Pain worse with use and direct contact to lateral hip or with walking long distances. The pain and limp has significantly limited her ability to ambulate and perform ADL's. She denies fevers chills nausea vomiting. Pain did not respond to steroid injections or oral medications.        Allergy:  Allergies   Allergen Reactions    Amoxicillin Abdominal Pain and Other (See Comments)    Azithromycin Abdominal Pain and Other (See Comments)     Medications:  all current active meds have been reviewed  Past Medical History:  Past Medical History:   Diagnosis Date    Chronic pain disorder     Disease of thyroid gland     GERD (gastroesophageal reflux disease)     Hyperlipidemia     Hypertension     Seasonal allergies      Past Surgical History:  Past Surgical History:   Procedure Laterality Date    BACK SURGERY  2019    laminectomy    COLONOSCOPY      JOINT REPLACEMENT Right     RTHR    NERVE BLOCK Right 8/26/2021    Procedure: BLOCK MEDIAL BRANCH L3, L4, L5;  Surgeon: Ailin Cochran MD;  Location: [facility];  Service: Pain Management     NERVE BLOCK Right 9/21/2021    Procedure: BLOCK MEDIAL BRANCH Right L3, L4, L5 #2;  Surgeon: Ailin Cochran MD;  Location: [facility];  Service: Pain Management     RADIOFREQUENCY ABLATION Right 10/14/2021    Procedure: L3 L4 L5 RADIO FREQUENCY ABLATION;  Surgeon: Ailin Cochran MD;  Location: [facility];  Service: Pain Management      Family History:  Family History   Problem Relation Age of Onset    No Known Problems Mother     No Known Problems Father      Social History:  Social History     Substance and Sexual Activity   Alcohol Use Yes    Comment: social occasions like a wedding     Social History     Substance and Sexual Activity   Drug Use Never     Social History     Tobacco Use   Smoking Status Never   Smokeless Tobacco Never           ROS:  General: Per HPI  Skin: Negative, except if noted below  HEENT: Negative  Respiratory: Negative  Cardiovascular: Negative  Gastrointestinal: Negative  Urinary: Negative  Vascular: Negative  Musculoskeletal: Positive per HPI   Neurologic: Positive per HPI  Endocrine: Negative    Objective:  BP Readings from Last 1 Encounters:   12/07/23 124/79      Wt Readings from Last 1 Encounters:   12/07/23 91.2 kg (201 lb)        Respiratory:   non-labored respirations    Lymphatics:  no palpable lymph nodes    Gait and Station:   Trendelenburg gait    Neurologic:   Alert and oriented times 3  Patient with normal sensation except as noted below  Deep tendon reflexes 2+ except as noted in MSK exam    Right Lower Extremity:    Right Hip     Inspection: skin inciision well healed. Approx 1cm increase leg length on right compared to left. Range of Motion: 0-100 flexion with pain lateral right hip with deep flexion and external rotation.     - log roll    + Trendelenburg sign    Motor: 5/5 Q/HS/TA/GS/P    Pulses: 2+ DP     SILT DP/SP/S/S/TN    Imaging:  My interpretation XR AP pelvis/ right hip: well fixed press-fit right KULWANT, mild increase in leg length and offset on the right. BMI:   Estimated body mass index is 31.96 kg/m² as calculated from the following:    Height as of this encounter: 5' 6.5" (1.689 m). Weight as of this encounter: 91.2 kg (201 lb). BSA:   Estimated body surface area is 2.02 meters squared as calculated from the following:    Height as of this encounter: 5' 6.5" (1.689 m). Weight as of this encounter: 91.2 kg (201 lb).            Scribe Attestation      I,:   am acting as a scribe while in the presence of the attending physician.:       I,:   personally performed the services described in this documentation    as scribed in my presence.:

## 2023-12-08 ENCOUNTER — OFFICE VISIT (OUTPATIENT)
Dept: PHYSICAL THERAPY | Facility: CLINIC | Age: 73
End: 2023-12-08
Payer: MEDICARE

## 2023-12-08 DIAGNOSIS — M76.31 ILIOTIBIAL BAND SYNDROME, RIGHT: ICD-10-CM

## 2023-12-08 DIAGNOSIS — G57.01 PIRIFORMIS SYNDROME, RIGHT: Primary | ICD-10-CM

## 2023-12-08 DIAGNOSIS — M54.16 RIGHT LUMBAR RADICULOPATHY: ICD-10-CM

## 2023-12-08 PROCEDURE — 97110 THERAPEUTIC EXERCISES: CPT

## 2023-12-08 PROCEDURE — 97140 MANUAL THERAPY 1/> REGIONS: CPT

## 2023-12-08 NOTE — PROGRESS NOTES
Daily Note     Today's date: 2023  Patient name: Corona Eugene  : 1950  MRN: [id number]  Referring provider: La Garcia MD  Dx:   Encounter Diagnosis     ICD-10-CM    1. Piriformis syndrome, right  G57.01       2. Iliotibial band syndrome, right  M76.31       3. Right lumbar radiculopathy  M54.16                      Subjective: Patient offers no new complaints. She notes she had another dr appt yesterday and has a new script to add dx. Objective: See treatment diary below      Assessment: Tolerated treatment well without complaint. Held on step ups due to this aggravating her knee last session Moderate relief noted with STM. Patient would benefit from continued PT to increase R hip strength for improved function in ADLs. Plan: Continue per plan of care.       Precautions: Lumbar surgery, R THR, L hip OA     Daily Treatment Diary:      Initial Evaluation Date: 10/24/23                                       POC 2024  Compliance    Visit Number 11     5  6  7  8  9  10   [address] Captured          Y      Y             Manual                   STM R lumbar PVMs, R piri, ITB/TFL R Glut, piri, ITB 10 min     KL  10 PC  10'  thoracic 10 min  R glutes/piri and thoracic 15 min  R Glut, piri, ITB   IASTM same                   Gentle passive ITB no combined flexion and adduction 3 x 30     3 x 30" supine  3 x 30" supine  3 x 30" supine  3 x 30" supine  -  3 x 30     Ther-Ex                   TA setting H/L 20x2"  Dead bugs     5"x15  10x 2" dead bugs  10x 2" dead bugs  20x 2" dead bugs  20x 2" dead bugs  20x2"  Dead bugs   LTR short arc 5"x10     5"x10  5" x 10 5"x10  5"x10  5"x10  5"x10   Hip abd w/knee ext S/L B       2x10 B  3x10 R only  2#  2# 2x10 ea stand          S/l clams 2x10     Fire hydrants  1# 2 x 10 L S/L Fire hydrants  1# 2 x 10 L S/L Fire hydrants  1# 2 x 10 L S/L S/L clams 2x10 Fire hydrants 2 x 10 L S/L -   Th ext over roll            open book 5"x10 ea  open book 5"x10 ea D/c   Lumbar ext standing      5"x10 over counter  nc           Seated UTR B        10x 5"  10x 5"  5"x10  5"x10     Seated TA with scap retractions      5"x10  standing red tband 10x 5" cues for equal weight distrib  standing red tband 15x 5" cues for equal weight distrib  np       B BLE bridge  2 x 10 RLE bridge with TA       U bridges 10x r and L  10x ea U bridge  10x ea U bridge  10x ea U bridge  2 x 10 RLE bridge with TA                      nustep       L2 10 min  L 2 10  L2 10 min  np       Neuro Re-Ed                   Mini squats   SLB Each LE 3 x 10-20 seconds HR A               SLB Each LE 3 x 10-20 seconds HR A   Ta c hip add Ball sqz 5" x 2m       5"X20 5"x20 Ball sqz 5" x 2m   TA c hip abd Pilates [place] 5" x 20       GTB 5"x20 GTB 5"x20 Pilates [place] 5" x 20   Lunges with TA             Step ups/lateral held         6" 10 x each LE-noted R knee pain as limiting                             Ther-Act                                                            Modalities                   CP/HP R hip CP 10m post    5 mins 10m HP R hip Cp 10 min post Cp 10 min post Cp 10m post CP 10 m post   TENs R hip

## 2023-12-11 ENCOUNTER — EVALUATION (OUTPATIENT)
Dept: PHYSICAL THERAPY | Facility: CLINIC | Age: 73
End: 2023-12-11
Payer: MEDICARE

## 2023-12-11 DIAGNOSIS — S76.019A RUPTURE OF HIP ABDUCTOR TENDON: Primary | ICD-10-CM

## 2023-12-11 DIAGNOSIS — T84.84XA PAINFUL ORTHOPAEDIC HARDWARE (HCC): ICD-10-CM

## 2023-12-11 DIAGNOSIS — M25.551 RIGHT HIP PAIN: ICD-10-CM

## 2023-12-11 DIAGNOSIS — M46.1 SACROILIITIS (HCC): ICD-10-CM

## 2023-12-11 PROCEDURE — 97110 THERAPEUTIC EXERCISES: CPT | Performed by: PHYSICAL THERAPIST

## 2023-12-11 PROCEDURE — 97112 NEUROMUSCULAR REEDUCATION: CPT | Performed by: PHYSICAL THERAPIST

## 2023-12-11 PROCEDURE — 97164 PT RE-EVAL EST PLAN CARE: CPT | Performed by: PHYSICAL THERAPIST

## 2023-12-11 NOTE — PROGRESS NOTES
PT RE-EVALUATION    Today's date: 2023  Patient name: Madai Oates  : 1950  MRN: [id number]  Referring provider: Darlin Marquez MD  Dx:   Encounter Diagnosis     ICD-10-CM    1. Rupture of hip abductor tendon  S76.019A       2. Right hip pain  M25.551       3. Sacroiliitis ([address])  M46.1       4. Painful orthopaedic hardware ([address])  T84.84XA           Start Time: 1015  Stop Time: 1100  Total time in clinic (min): 45 minutes    Assessment  Assessment details: 69 yo female  reerred to care by Dr. Nathan Ricardo for care of R dx with right  hip abductor tendon rupture, R hip pain, sacroliliits, and painful orthopedic hardware. Pt has been tx in PT for care of  lumbar spine radiculopathy, ITB syndrome and piriformis syndrome with some positive response but feeling some plateau in progress. Pt notes R hip "mechanics", pain and decreased strength are most limiting to daily WB function. SLB on RLE 10 seconds with sway. R hip abduction strength 4-/5, R hip extension and flexion ROM limited as per document. Gait dysfunction present with dec. Gait speed, dec stance RLE, trunk flexed with amb. Pt impairments limits her tolerance for walking, standing and  ADLs, homekeeping and walking for exercise and community outings. She does require A Of  for homekeeping tasks ie vaccuuming. Pt ability to achieve outlined goals may be limited by severity of tear, aggravation of lower back pain should it occur, and  any decreased compliance with HEP should it occur.    Impairments: abnormal gait, abnormal or restricted ROM, impaired physical strength, lacks appropriate home exercise program, pain with function, weight-bearing intolerance and poor posture   Other impairment: 10 sec RLE with sway, no pain    Symptom irritability: moderateUnderstanding of Dx/Px/POC: good   Prognosis: fair  Prognosis details: Chronic pain, tear of gluteus minimus, lumbar fusion, prior injections may impact ability to achieve rehab goals of care    Goals  Goals  STG 2-4 weeks  Increase  R hip single limb stance to 20-30 seconds to improve hip/pelvioc stability with gait-  plateaued at  79-50 seconds  Increase  Core and hip strength by  1/2 MMT  To allow for improved ambulation tolerances and decrased hip pain, no greater than 1-2/10 amb. 40-50 min.with least AD- not achieved  LTG 4-6 weeks:  Increase strength R hip and core mm by 1 MMT to allow for ambulation no to least AD 60 min with pain no greater than 2/10 R hip not achieved  Improve SLB RLE to 40-60 seconds to allow for improved endurance stability of hip/pelvis with ambulation not achieved- progressing  Patient I with final HEP and self managements to self manage and reduce likelihood of recurrence following d/c to HEP      Plan  Patient would benefit from: skilled physical therapy  Planned modality interventions: cryotherapy and unattended electrical stimulation  Planned therapy interventions: abdominal trunk stabilization, gait training, functional ROM exercises, stretching, strengthening, neuromuscular re-education, manual therapy, home exercise program, therapeutic exercise, postural training and patient education  Frequency: 2x week  Duration in weeks: 5  Plan of Care beginning date: 12/11/2023  Plan of Care expiration date: 1/15/2024  Treatment plan discussed with: patient        Subjective Evaluation    History of Present Illness  Mechanism of injury:  Update 12/11/23- Pt returns for care today with orders from Dr. Chelo Alfaro for care of R hip dx of rupture of R hip abductor tendon, painful orthopaedic hardware, R hip pain and sacroilitis. She was to see Dr. Roxanne Dial for second opinion of R hip abductor muscle tear and recommended approach regarding surgery. Pt. Is scheduled for MRI  later in December. She is ordered for PT to R hip and sacroiliac region.  Pt notes she feels some plateau in progress since initial levels with onset of care in PT. However she does admit to not being very active and found she was able to increase her waling tolerance over weekend and surprised herself. She notes increase awareness to increase her walking as tolerated. She notes she is compliant with her HEP most days. She notes difficulty with standing tolerances, walking tolerances, stair climbing and functional WB. Sh coleen gomes is worse in the AM OOB and improves with use of medications. Pt. Notes pain persists in R posterolateral hip which increases with increase in weight bearing activity. She notes decrease in motivation for ambulation as her hip fatigues and gets sore even with use of cane. She notes not being able to participate with decorating  and homekeeping, family outings as she would like. She notes thoracic and L hip pain  and at times lumbar pain, all do contribute to her decrease in activity tolerances but feels the R hip is the primary reason for her decrease in WB function. Pt notes she continues to use heel lift,  AD of cane, salon pas, arnica gel, tylenol, mobic or celebrex, CP. No recent  injections in either hip. But + PMH Of injections. Pt medications, PMH and allergies reviewed in chart and with  patient today.               Recurrent probem    Quality of life: good    Patient Goals  Patient goals for therapy: decreased pain and increased strength  Patient goal: decrease pain medications to manage her pain, to stand to complete cooking, cleaning, ambulation longer and on stairs improved, to get rid of cane  Pain  Current pain ratin  At best pain ratin  At worst pain ratin  Location: R buttock, gluteal region and lateral hip, at times thoracic  and lumbar regions  Quality: sharp, discomfort and dull ache  Relieving factors: relaxation, rest, ice and medications  Aggravating factors: standing, walking, stair climbing and lifting  Progression: worsening    Social Support  Steps to enter house: yes  Stairs in house: yes   Lives in: multiple-level home  Lives with: spouse    Employment status: not working (retired)  Hand dominance: right      Diagnostic Tests  Abnormal MRI: tear of gluteus minimus, atrophy of gluteus medius. Treatments  Previous treatment: physical therapy, medication and injection treatment  Current treatment: medication        Objective     Static Posture     Comments  Increase in RLE leg length, R knee flexed during stance, WB on lateral aspect of L foot during stance. Postural Observations  Seated posture: fair  Standing posture: fair  Correction of posture: makes symptoms worse    Additional Postural Observation Details  Minimal to moderate forward head posturing in seated and standing as well as with ambulation. Pt with increasing lumbar extension toward neutral increases pain in R lower lumbar region and R buttock lateral hip with out radiation into RLE. Palpation   Left   No palpable tenderness to the erector spinae. Right   Hypertonic in the erector spinae. Tenderness of the erector spinae, gluteus medius and lumbar paraspinals. Trigger point to erector spinae. Additional Palpation Details  Palpation of R TFL and ITB  pain reported, hypertonic  Palpation of R piriformis m hypertonic and painful    Tenderness     Right Hip   Tenderness in the PSIS and greater trochanter. Neurological Testing     Sensation     Lumbar   Left   Intact: light touch    Right   Intact: light touch    Reflexes   Left   Patellar (L4): normal (2+)  Achilles (S1): trace (1+)    Right   Patellar (L4): normal (2+)  Achilles (S1): trace (1+)    Additional Neurological Details  SLB  RLE with sway, negative trendelenberg, tolerates 10 seconds prior to UE A on HR, denies any pain with this  task.      Active Range of Motion   Cervical/Thoracic Spine       Thoracic    Flexion:  Restriction level: minimal  Extension:  with pain Restriction level: maximal  Left lateral flexion:  Restriction level: moderate  Right lateral flexion:  Restriction level: moderate  Left rotation:  Restriction level: moderate  Right rotation:  Restriction level: moderate    Lumbar   Flexion:  Restriction level: minimal  Extension:  with pain Restriction level: maximal  Left lateral flexion:  Restriction level: moderate  Right lateral flexion:  with pain Restriction level: maximal    Right Hip   Flexion: 95 degrees   Extension: 5 degrees   Abduction: 25 degrees   Adduction: 10 degrees   External rotation (90/90): 25 degrees     Passive Range of Motion     Additional Passive Range of Motion Details  Flexibility of R ITB  WFLs without pain provocation R hip , LE    Strength/Myotome Testing     Left Hip   Planes of Motion   Flexion: 4+  Extension: 4  Abduction: 4  Adduction: 4+  External rotation: 4+  Internal rotation: 4+    Right Hip   Planes of Motion   Flexion: 4+  Extension: 4  Abduction: 4- (4-/5 to 4/5 , dec in endurance)  Adduction: 4+  External rotation: 4-  Internal rotation: 4    Left Knee   Flexion: 4+  Extension: 4+    Right Knee   Flexion: 4+  Extension: 4+    Left Ankle/Foot   Dorsiflexion: 4+  Plantar flexion: 4+  Inversion: 4+  Eversion: 4+  Great toe flexion: 4+  Great toe extension: 4+    Right Ankle/Foot   Dorsiflexion: 4+  Plantar flexion: 4+  Eversion: 4+  Great toe flexion: 4+  Great toe extension: 4+    Additional Strength Details  TA activation fair to good  hooklying  and seated    Tests     Lumbar     Left   Negative passive SLR. Right   Negative passive SLR. Right Hip   Positive long sit. Ambulation     Observational Gait   Gait: antalgic   Decreased walking speed, stride length and right stance time. Additional Observational Gait Details  12/11/23- rigid trunk and no RUE arm swing with amb.  Slight trunk flexion demonstrated           Precautions: Lumbar surgery, R THR, L hip OA     Daily Treatment Diary:      Initial Evaluation Date: 10/24/23                                       POC 1/2/2024  Compliance 12/11   10/30  11/2  11/6  11/9  11/16  11/20  11/30  12/5   Visit Number 1   3  4  5  6  7  8  9  10   Re-Eval  Y                 MC   Foto Captured Jennifer Cochran          12/11   10/30  11/2  11/6  11/9  11/16  11/20  11/30 12/5   Manual                      R SI, hip -->   15'  15'  KL  10 PC  10'  thoracic 10 min  R glutes/piri and thoracic 15 min  R Glut, piri, ITB   PROM R hip extension L S/L                      AA hip abd  from add to end range L S/L//mery holds in abd knee ext 2 x 10, 10 x 5" multiple angles    3 x 30" supine  3 x 30" supine  3 x 30" supine  3 x 30" supine  3 x 30" supine  3 x 30" supine  -  3 x 30     Ther-Ex                      TA setting H/L 10x 5"   5" x 15  5"x15  5"x15  10x 2" dead bugs  10x 2" dead bugs  20x 2" dead bugs  20x 2" dead bugs  20x2"  Dead bugs   LTR short arc 10x   5" x 10  5"x10  5"x10  5" x 10 5"x10  5"x10  5"x10  5"x10   Fire hydrant L S/L modified 2 x 10   2x 10 R and L  2x10 B   2x10 B  3x10 R only  2#  2# 2x10 ea stand         BLE bridge with TA and BTB abd  2 x 10, 3"     Fire hydrants  1# 2 x 10 L S/L Fire hydrants  1# 2 x 10 L S/L Fire hydrants  1# 2 x 10 L S/L S/L clams 2x10 Fire hydrants 2 x 10 L S/L -   U bridging -opp hip/knee at 90/90 2 x 10 ea              open book 5"x10 ea  open book 5"x10 ea D/c   Standing hip abd PREs  Add ext, flex PREs 3# 10x each    -->   5" x 5  5"x10 over counter  5"x10 over counter  nc           Seated TB ER short arc R LE [place] 2 x 10   10x ea      10x 5"  10x 5"  5"x10  5"x10          5" x 10  5"x10  5"x10  standing red tband 10x 5" cues for equal weight distrib  standing red tband 15x 5" cues for equal weight distrib  np                  U bridges 10x r and L  10x ea U bridge  10x ea U bridge  10x ea U bridge  2 x 10 RLE bridge with TA                         nustep    L1 10m  L2 10 min   L2 10 min  L 2 10  L2 10 min  np       Neuro Re-Ed                      Mini squats   10x  10x 2 x stopped sec to R knee pain  held            SLB Each LE 3 x 10-20 seconds HR A   RLE mini squat with L hip abd, ext, and flexion taps 10 x ea       5"X20 5"x20 Ball sqz 5" x 2m   SLB either LE   3 x 10-15 " posture cues       GTB 5"x20 GTB 5"x20 Pilates [place] 5" x 20   Lunges with TA             Step ups/lateral          6" 10 x each LE-noted R knee pain as limiting   Lateral stepping             hurdles             Ther-Act                                                               Modalities                      CP/HP R hip   10m 10min  5 mins 10m HP R hip Cp 10 min post Cp 10 min post Cp 10m post CP 10 m post   TENs R hip

## 2023-12-14 ENCOUNTER — OFFICE VISIT (OUTPATIENT)
Dept: PHYSICAL THERAPY | Facility: CLINIC | Age: 73
End: 2023-12-14
Payer: MEDICARE

## 2023-12-14 ENCOUNTER — TELEPHONE (OUTPATIENT)
Dept: PAIN MEDICINE | Facility: CLINIC | Age: 73
End: 2023-12-14

## 2023-12-14 DIAGNOSIS — M76.31 ILIOTIBIAL BAND SYNDROME, RIGHT: ICD-10-CM

## 2023-12-14 DIAGNOSIS — G57.01 PIRIFORMIS SYNDROME, RIGHT: ICD-10-CM

## 2023-12-14 DIAGNOSIS — M54.16 RIGHT LUMBAR RADICULOPATHY: ICD-10-CM

## 2023-12-14 DIAGNOSIS — S76.019A RUPTURE OF HIP ABDUCTOR TENDON: Primary | ICD-10-CM

## 2023-12-14 DIAGNOSIS — T84.84XA PAINFUL ORTHOPAEDIC HARDWARE (HCC): ICD-10-CM

## 2023-12-14 DIAGNOSIS — M25.551 RIGHT HIP PAIN: ICD-10-CM

## 2023-12-14 DIAGNOSIS — M46.1 SACROILIITIS (HCC): ICD-10-CM

## 2023-12-14 PROCEDURE — 97112 NEUROMUSCULAR REEDUCATION: CPT

## 2023-12-14 PROCEDURE — 97140 MANUAL THERAPY 1/> REGIONS: CPT

## 2023-12-14 PROCEDURE — 97110 THERAPEUTIC EXERCISES: CPT

## 2023-12-14 NOTE — PROGRESS NOTES
Daily Note     Today's date: 2023  Patient name: Ayde Bruno  : 1950  MRN: [id number]  Referring provider: Tami France MD  Dx:   Encounter Diagnosis     ICD-10-CM    1. Rupture of hip abductor tendon  S76.019A       2. Piriformis syndrome, right  G57.01       3. Right hip pain  M25.551       4. Iliotibial band syndrome, right  M76.31       5. Sacroiliitis ([address])  M46.1       6. Painful orthopaedic hardware ([address])  T84.84XA       7. Right lumbar radiculopathy  M54.16                      Subjective: Patient reports she feels good about the plan movingforward regarding following up with referring dr and ortho as well as getting her MRI. She notes no significant pain at start of session. Objective: See treatment diary below      Assessment: Tolerated treatment well without complaint. Some fatigue following SL activities. Moderate relief with MT. Instructed patient in use of personal TENS unit per request. She verbalized understanding. Patient would benefit from continued PT to increase R hip strength for improved function in daily activities. Plan: Continue per plan of care.       Precautions: Lumbar surgery, R THR, L hip OA     Daily Treatment Diary:      Initial Evaluation Date: 10/24/23                                       POC 2024  Compliance    Visit Number 1 2   4  5  6  7  8  9  10   Re-Eval  Y                MC   Foto Captured Irena Carrel             Manual                    R SI, hip --> R Glut, piri, ITB 12 min   15'  KL  10 PC  10'  thoracic 10 min  R glutes/piri and thoracic 15 min  R Glut, piri, ITB   PROM R hip extension L S/L                    AA hip abd  from add to end range L S/L//mery holds in abd knee ext 2 x 10, 10 x 5" multiple angles 2 x 10, 10 x 5" multiple angles   3 x 30" supine  3 x 30" supine  3 x 30" supine  3 x 30" supine  3 x 30" supine  -  3 x 30     Ther-Ex                    Dead bugs 10x 5" 20x 2-3"   5"x15  5"x15  10x 2" dead bugs  10x 2" dead bugs  20x 2" dead bugs  20x 2" dead bugs  20x2"  Dead bugs   LTR short arc 10x 5"x10   5"x10  5"x10  5" x 10 5"x10  5"x10  5"x10  5"x10   Fire hydrant L S/L modified 2 x 10 2x10   2x10 B   2x10 B  3x10 R only  2#  2# 2x10 ea stand         BLE bridge with TA and BTB abd  2 x 10, 3" 2x10 3"    Fire hydrants  1# 2 x 10 L S/L Fire hydrants  1# 2 x 10 L S/L Fire hydrants  1# 2 x 10 L S/L S/L clams 2x10 Fire hydrants 2 x 10 L S/L -   U bridging -opp hip/knee at 90/90 2 x 10 ea 2x10 ea           open book 5"x10 ea  open book 5"x10 ea D/c   Standing hip abd PREs  Add ext, flex PREs 3# 10x each    --> 3# 10x each dir B   5"x10 over counter  5"x10 over counter  nc           Seated TB ER short arc R LE [place] 2 x 10 Orange 2 x 10       10x 5"  10x 5"  5"x10  5"x10          5"x10  5"x10  standing red tband 10x 5" cues for equal weight distrib  standing red tband 15x 5" cues for equal weight distrib  np                U bridges 10x r and L  10x ea U bridge  10x ea U bridge  10x ea U bridge  2 x 10 RLE bridge with TA                       nustep     L2 10 min   L2 10 min  L 2 10  L2 10 min  np       Neuro Re-Ed                    Mini squats   10x 10x   held            SLB Each LE 3 x 10-20 seconds HR A   RLE mini squat with L hip abd, ext, and flexion taps 10 x ea 10x ea      5"X20 5"x20 Ball sqz 5" x 2m   SLB either LE   3 x 10-15 " posture cues 3 x 10-15 " posture cues      GTB 5"x20 GTB 5"x20 Pilates [place] 5" x 20   Lunges with TA             Step ups/lateral          6" 10 x each LE-noted R knee pain as limiting   Lateral stepping             hurdles             Ther-Act                                                             Modalities                    CP/HP R hip  Cp and home tens unit 10 min  10min  5 mins 10m HP R hip Cp 10 min post Cp 10 min post Cp 10m post CP 10 m post   TENs R hip

## 2023-12-18 ENCOUNTER — OFFICE VISIT (OUTPATIENT)
Dept: PHYSICAL THERAPY | Facility: CLINIC | Age: 73
End: 2023-12-18
Payer: MEDICARE

## 2023-12-18 DIAGNOSIS — M46.1 SACROILIITIS (HCC): ICD-10-CM

## 2023-12-18 DIAGNOSIS — M76.31 ILIOTIBIAL BAND SYNDROME, RIGHT: ICD-10-CM

## 2023-12-18 DIAGNOSIS — T84.84XA PAINFUL ORTHOPAEDIC HARDWARE (HCC): ICD-10-CM

## 2023-12-18 DIAGNOSIS — M25.551 RIGHT HIP PAIN: ICD-10-CM

## 2023-12-18 DIAGNOSIS — S76.019A RUPTURE OF HIP ABDUCTOR TENDON: Primary | ICD-10-CM

## 2023-12-18 DIAGNOSIS — G57.01 PIRIFORMIS SYNDROME, RIGHT: ICD-10-CM

## 2023-12-18 DIAGNOSIS — M54.16 RIGHT LUMBAR RADICULOPATHY: ICD-10-CM

## 2023-12-18 PROCEDURE — 97140 MANUAL THERAPY 1/> REGIONS: CPT

## 2023-12-18 PROCEDURE — 97110 THERAPEUTIC EXERCISES: CPT

## 2023-12-18 PROCEDURE — 97112 NEUROMUSCULAR REEDUCATION: CPT

## 2023-12-18 NOTE — PROGRESS NOTES
"Daily Note     Today's date: 2023  Patient name: RACHNA Pulido  : 1950  MRN: [id number]  Referring provider: Lexi Green MD  Dx:   Encounter Diagnosis     ICD-10-CM    1. Rupture of hip abductor tendon  S76.019A       2. Sacroiliitis (HCC)  M46.1       3. Piriformis syndrome, right  G57.01       4. Painful orthopaedic hardware (HCC)  T84.84XA       5. Right hip pain  M25.551       6. Iliotibial band syndrome, right  M76.31       7. Right lumbar radiculopathy  M54.16                      Subjective: Patient offers no new complaints at start of session. She states TENS use at home has been helping.      Objective: See treatment diary below      Assessment: Tolerated treatment well without complaint. Increased hypertonicity of TFL compared to last session. Patient with moderate fatigue following SL hip abduction work. Patient would benefit from continued PT to increase B hip strength and mobility for improved function in ADLs.      Plan: Continue per plan of care.      Precautions: Lumbar surgery, R THR, L hip OA     Daily Treatment Diary:      Initial Evaluation Date: 10/24/23                                       POC 2024  Compliance    Visit Number 11 12 13     7  8  9  10   Re-Eval  Y                Foto Captured Y       Y      Y             Manual                 R SI, hip --> R Glut, piri, ITB 12 min R Glut, piri, ITB 12 min     10'  thoracic 10 min  R glutes/piri and thoracic 15 min  R Glut, piri, ITB   PROM R hip extension L S/L                 AA hip abd  from add to end range L S/L//mery holds in abd knee ext 2 x 10, 10 x 5\" multiple angles 2 x 10, 10 x 5\" multiple angles 2x10// np d/t fatigue     3 x 30\" supine  3 x 30\" supine  -  3 x 30     Ther-Ex                 Dead bugs 10x 5\" 20x 2-3\" 20x 3\"     10x 2\" dead bugs  20x 2\" dead bugs  20x 2\" dead bugs  20x2\"  Dead bugs   LTR short arc " "10x 5\"x10 5\"x10    5\"x10  5\"x10  5\"x10  5\"x10   Fire hydrant L S/L modified 2 x 10 2x10 2x10     2# 2x10 ea stand         BLE bridge with TA and BTB abd  2 x 10, 3\" 2x10 3\" 2x10 3\"    Fire hydrants  1# 2 x 10 L S/L Fire hydrants  1# 2 x 10 L S/L S/L clams 2x10 Fire hydrants 2 x 10 L S/L -   U bridging -opp hip/knee at 90/90 2 x 10 ea 2x10 ea 2x10 ea       open book 5\"x10 ea  open book 5\"x10 ea D/c   Standing hip abd PREs  Add ext, flex PREs 3# 10x each    --> 3# 10x each dir B 3# 10x each dir B              Seated TB ER short arc R LE [place] 2 x 10 Orange 2 x 10 Orange 2 x 10     10x 5\"  5\"x10  5\"x10             standing red tband 15x 5\" cues for equal weight distrib  np               10x ea U bridge  10x ea U bridge  10x ea U bridge  2 x 10 RLE bridge with TA                    nustep        L2 10 min  np       Neuro Re-Ed                 Mini squats   10x 10x 10x           SLB Each LE 3 x 10-20 seconds HR A   RLE mini squat with L hip abd, ext, and flexion taps 10 x ea 10x ea 10x ea     5\"X20 5\"x20 Ball sqz 5\" x 2m   SLB either LE   3 x 10-15 \" posture cues 3 x 10-15 \" posture cues 3 x 10-15 \" posture cues     GTB 5\"x20 GTB 5\"x20 Pilates [place] 5\" x 20   Lunges with TA             Step ups/lateral          6\" 10 x each LE-noted R knee pain as limiting   Lateral stepping             hurdles             Ther-Act                                                          Modalities                 CP/HP R hip  Cp and home tens unit 10 min Cp 10 min    Cp 10 min post Cp 10 min post Cp 10m post CP 10 m post   TENs R hip                                                 "

## 2023-12-21 ENCOUNTER — APPOINTMENT (OUTPATIENT)
Dept: PHYSICAL THERAPY | Facility: CLINIC | Age: 73
End: 2023-12-21
Payer: MEDICARE

## 2023-12-28 ENCOUNTER — HOSPITAL ENCOUNTER (OUTPATIENT)
Dept: MRI IMAGING | Facility: HOSPITAL | Age: 73
End: 2023-12-28
Payer: MEDICARE

## 2023-12-28 DIAGNOSIS — M25.551 RIGHT HIP PAIN: ICD-10-CM

## 2023-12-28 PROCEDURE — G1004 CDSM NDSC: HCPCS

## 2023-12-28 PROCEDURE — 73721 MRI JNT OF LWR EXTRE W/O DYE: CPT

## 2023-12-29 ENCOUNTER — OFFICE VISIT (OUTPATIENT)
Dept: PHYSICAL THERAPY | Facility: CLINIC | Age: 73
End: 2023-12-29
Payer: MEDICARE

## 2023-12-29 DIAGNOSIS — T84.84XA PAINFUL ORTHOPAEDIC HARDWARE (HCC): ICD-10-CM

## 2023-12-29 DIAGNOSIS — M76.31 ILIOTIBIAL BAND SYNDROME, RIGHT: ICD-10-CM

## 2023-12-29 DIAGNOSIS — M54.16 RIGHT LUMBAR RADICULOPATHY: ICD-10-CM

## 2023-12-29 DIAGNOSIS — S76.019A RUPTURE OF HIP ABDUCTOR TENDON: Primary | ICD-10-CM

## 2023-12-29 DIAGNOSIS — M46.1 SACROILIITIS (HCC): ICD-10-CM

## 2023-12-29 DIAGNOSIS — G57.01 PIRIFORMIS SYNDROME, RIGHT: ICD-10-CM

## 2023-12-29 DIAGNOSIS — M25.551 RIGHT HIP PAIN: ICD-10-CM

## 2023-12-29 PROCEDURE — 97112 NEUROMUSCULAR REEDUCATION: CPT | Performed by: PHYSICAL THERAPIST

## 2023-12-29 PROCEDURE — 97140 MANUAL THERAPY 1/> REGIONS: CPT | Performed by: PHYSICAL THERAPIST

## 2023-12-29 PROCEDURE — 97110 THERAPEUTIC EXERCISES: CPT | Performed by: PHYSICAL THERAPIST

## 2023-12-29 NOTE — PROGRESS NOTES
"Daily Note     Today's date: 2023  Patient name: RACHNA Pulido  : 1950  MRN: [id number]  Referring provider: Lexi Green MD  Dx:   Encounter Diagnosis     ICD-10-CM    1. Rupture of hip abductor tendon  S76.019A       2. Sacroiliitis (HCC)  M46.1       3. Piriformis syndrome, right  G57.01       4. Right hip pain  M25.551       5. Iliotibial band syndrome, right  M76.31       6. Painful orthopaedic hardware (HCC)  T84.84XA       7. Right lumbar radiculopathy  M54.16                      Subjective: Pt notes mid back pain as limiting with standing  and walking. She notes pain in R hip  and gluteal region with amb and standing. She cont to use her SLC. Pain today 0/10 at rest, with amb. 1-2 /10.  She had MRI to R hip  to further assess soft tissue tear of gluteus minimus and will follow with specialist on 2024.       Objective: See treatment diary below. Pt amb with SLC with rigid trunk and no arm swing, flexed posturing.  She noted no pain sx in R hip or lumbar spine with tx today.       Assessment: Tolerated treatment well. She did require cues for form with single leg balance tasks and pelvic stability. Pt. Able to complete SLB on LLE  20-25 seconds, R LE 10-12 seconds piror to sway. Pt. Trialed stair amb with BHR A 4\" steps, noting no pain but notes R LE does not feel strong to complete with safety today. Pt noted tenderness to palpation of R gluteus medius with STM , minimal tenderness over R greater trochanter and none along the ITB. Patient demonstrated fatigue post treatment and would benefit from continued PT to achieve goals of care.       Plan: Continue per plan of care.      Precautions: Lumbar surgery, R THR, L hip OA     Daily Treatment Diary:      Initial Evaluation Date: 10/24/23                                       POC 2024  Compliance   12   Visit Number 11 12 13 14    7  8  9  10   Re-Eval  Y    ReEval         MC   [place] " "Captured Y       Y      Y          12/11 12/14 12/18 12/29 11/16 11/20 11/30 12/5   Manual                 R SI, hip --> R Glut, piri, ITB 12 min R Glut, piri, ITB 12 min G glut, ITB, pirri 10m    10'  thoracic 10 min  R glutes/piri and thoracic 15 min  R Glut, piri, ITB   PROM R hip extension L S/L    3m             AA hip abd  from add to end range L S/L//mery holds in abd knee ext 2 x 10, 10 x 5\" multiple angles 2 x 10, 10 x 5\" multiple angles 2x10// np d/t fatigue 2x10,//  10x5\" holds multiangles    3 x 30\" supine  3 x 30\" supine  -  3 x 30     Ther-Ex                 Dead bugs 10x 5\" 20x 2-3\" 20x 3\" 20x3\"    10x 2\" dead bugs  20x 2\" dead bugs  20x 2\" dead bugs  20x2\"  Dead bugs   LTR short arc 10x 5\"x10 5\"x10 5\"x10   5\"x10  5\"x10  5\"x10  5\"x10   Fire hydrant L S/L modified 2 x 10 2x10 2x10 [place]//fire hydr 20x     2# 2x10 ea stand         BLE bridge with TA and BTB abd  2 x 10, 3\" 2x10 3\" 2x10 3\" 20x 3\"-on heels- glut activ   Fire hydrants  1# 2 x 10 L S/L Fire hydrants  1# 2 x 10 L S/L S/L clams 2x10 Fire hydrants 2 x 10 L S/L -   U bridging -opp hip/knee at 90/90 2 x 10 ea 2x10 ea 2x10 ea 20x ea-on heels glut activ      open book 5\"x10 ea  open book 5\"x10 ea D/c   Standing hip abd  ext, flex PREs 3# 10x each    --> 3# 10x each dir B 3# 10x each dir B 3# 15x ea dir B             Seated TB ER short arc R LE [place] 2 x 10 Orange 2 x 10 Orange 2 x 10 GrTB 2 x 10    10x 5\"  5\"x10  5\"x10             standing red tband 15x 5\" cues for equal weight distrib  np               10x ea U bridge  10x ea U bridge  10x ea U bridge  2 x 10 RLE bridge with TA                    nustep        L2 10 min  np       Neuro Re-Ed                 Mini squats   10x 10x 10x 10x          SLB Each LE 3 x 10-20 seconds HR A   RLE mini squat with L hip abd, ext, and flexion taps 10 x ea 10x ea 10x ea 10x ea    5\"X20 5\"x20 Ball sqz 5\" x 2m   SLB either LE   3 x 10-15 \" posture cues 3 x 10-15 \" posture cues 3 x 10-15 \" posture cues " "3x10-15\" posture cues    GTB 5\"x20 GTB 5\"x20 Pilates [place] 5\" x 20   Lunges with TA             Step ups/lateral          6\" 10 x each LE-noted R knee pain as limiting   Lateral stepping             hurdles             Ther-Act                  Stair amb recip x 2 4\", BHR A                                        Modalities                 CP/HP R hip  Cp and home tens unit 10 min Cp 10 min 5m CP R hip   Cp 10 min post Cp 10 min post Cp 10m post CP 10 m post   TENs R hip                                                   "

## 2024-01-02 ENCOUNTER — OFFICE VISIT (OUTPATIENT)
Dept: PHYSICAL THERAPY | Facility: CLINIC | Age: 74
End: 2024-01-02
Payer: MEDICARE

## 2024-01-02 DIAGNOSIS — S76.019A RUPTURE OF HIP ABDUCTOR TENDON: Primary | ICD-10-CM

## 2024-01-02 DIAGNOSIS — M54.16 RIGHT LUMBAR RADICULOPATHY: ICD-10-CM

## 2024-01-02 DIAGNOSIS — M46.1 SACROILIITIS (HCC): ICD-10-CM

## 2024-01-02 DIAGNOSIS — T84.84XA PAINFUL ORTHOPAEDIC HARDWARE (HCC): ICD-10-CM

## 2024-01-02 DIAGNOSIS — G57.01 PIRIFORMIS SYNDROME, RIGHT: ICD-10-CM

## 2024-01-02 DIAGNOSIS — M25.551 RIGHT HIP PAIN: ICD-10-CM

## 2024-01-02 DIAGNOSIS — M76.31 ILIOTIBIAL BAND SYNDROME, RIGHT: ICD-10-CM

## 2024-01-02 PROCEDURE — 97110 THERAPEUTIC EXERCISES: CPT | Performed by: PHYSICAL THERAPIST

## 2024-01-02 PROCEDURE — 97140 MANUAL THERAPY 1/> REGIONS: CPT | Performed by: PHYSICAL THERAPIST

## 2024-01-02 PROCEDURE — 97112 NEUROMUSCULAR REEDUCATION: CPT | Performed by: PHYSICAL THERAPIST

## 2024-01-02 NOTE — PROGRESS NOTES
Daily Note     Today's date: 2024  Patient name: RACHNA Pulido  : 1950  MRN: [id number]  Referring provider: Lexi Green MD  Dx:   Encounter Diagnosis     ICD-10-CM    1. Rupture of hip abductor tendon  S76.019A       2. Sacroiliitis (HCC)  M46.1       3. Piriformis syndrome, right  G57.01       4. Right hip pain  M25.551       5. Iliotibial band syndrome, right  M76.31       6. Painful orthopaedic hardware (HCC)  T84.84XA       7. Right lumbar radiculopathy  M54.16                      Subjective:  I continue to have stiffness and pain in  the am  in right mid and lower back, R buttock and hip addressed with medications that I take in the am and later in the day as I do more, my pain iincreases. At worst , my pain increases to 5-6/10.  I see Dr. Garcia on Thursday to review my MRI and approach moving forward. .       Objective: See treatment diary below       Assessment: Tolerated treatment well. She was able to hold isometric hip abd x 10 seconds at multiple angles in L S/L with knee extended today with less fatigue noted and no pain sx reported.   Pt did show fatigue in R hip abductors with standing TE and balance training tasks and was unable to maintain neutral pelvis without R + trendelenberg secondary to muscle fatigue and decrease in endurance.  Step up exercises limited by R knee pain complaints this date and tx modified to avoid R knee pain sx.  Patient would benefit from continued PT to progress toward outlined goals of care.       Plan: Continue per plan of care.      Precautions: Lumbar surgery, R THR, L hip OA     Daily Treatment Diary:      Initial Evaluation Date: 10/24/23                                       POC 1/15/2024  Compliance 24   Visit Number 11 12 13 14 15   7  8  9  10   Re-Eval  Y             JOSE   Foto Captured Y       Y      Y          24   Manual            "      R SI, hip --> R Glut, piri, ITB 12 min R Glut, piri, ITB 12 min G glut, ITB, pirri 10m G glut, ITB, pirri 10m   10'  thoracic 10 min  R glutes/piri and thoracic 15 min  R Glut, piri, ITB   PROM R hip extension L S/L    3m 3m            AA hip abd  from add to end range L S/L//mery holds in abd knee ext 2 x 10, 10 x 5\" multiple angles 2 x 10, 10 x 5\" multiple angles 2x10// np d/t fatigue 2x10,//  10x5\" holds multiangles 2 x 10// 10 x 5-10\" holds x 2   3 x 30\" supine  3 x 30\" supine  -  3 x 30     Ther-Ex                 Dead bugs 10x 5\" 20x 2-3\" 20x 3\" 20x3\" 20x3\"   10x 2\" dead bugs  20x 2\" dead bugs  20x 2\" dead bugs  20x2\"  Dead bugs   LTR short arc 10x 5\"x10 5\"x10 5\"x10 5\" x  10  5\"x10  5\"x10  5\"x10  5\"x10   Fire hydrant L S/L modified 2 x 10 2x10 2x10 Clamshell//fire hydr 20x  GTB clam shells 2 x 10, 2\"//fire hydrants 20x   2# 2x10 ea stand         BLE bridge with TA and BTB abd  2 x 10, 3\" 2x10 3\" 2x10 3\" 20x 3\"-on heels- glut activ 20x on heels glut activ 20x3\"  Fire hydrants  1# 2 x 10 L S/L Fire hydrants  1# 2 x 10 L S/L S/L clams 2x10 Fire hydrants 2 x 10 L S/L -   U bridging -opp hip/knee at 90/90 2 x 10 ea 2x10 ea 2x10 ea 20x ea-on heels glut activ 20x on heels glut activ     open book 5\"x10 ea  open book 5\"x10 ea D/c   Standing hip abd  ext, flex PREs 3# 10x each    --> 3# 10x each dir B 3# 10x each dir B 3# 15x ea dir B 3# x 15 ea dir            Seated TB ER short arc R LE [place] 2 x 10 Orange 2 x 10 Orange 2 x 10 GrTB 2 x 10 GTB 2 x 10   10x 5\"  5\"x10  5\"x10             standing red tband 15x 5\" cues for equal weight distrib  np               10x ea U bridge  10x ea U bridge  10x ea U bridge  2 x 10 RLE bridge with TA                    nustep        L2 10 min  np       Neuro Re-Ed                 Mini squats   10x 10x 10x 10x 10x         SLB Each LE 3 x 10-20 seconds HR A   RLE mini squat with L hip abd, ext, and flexion taps 10 x ea 10x ea 10x ea 10x ea 15 x ea   5\"X20 5\"x20 Ball sqz 5\" x 2m " "  SLB either LE   3 x 10-15 \" posture cues 3 x 10-15 \" posture cues 3 x 10-15 \" posture cues 3x10-15\" posture cues 3 x 15\"- posture cues AirEx   GTB 5\"x20 GTB 5\"x20 Pil[employer] [place] 5\" x 20   Lunges with TA     2 laps limited by R Knee pain        Step ups/lateral     Trial step up limited by R knee pain 8\" and 4\"     6\" 10 x each LE-noted R knee pain as limiting   Lateral stepping             hurdles             Ther-Act                  Stair amb recip x 2 4\", BHR A                                        Modalities                 CP/HP R hip  Cp and home tens unit 10 min Cp 10 min 5m CP R hip 10m CP R Knee and R buttock/hip  Cp 10 min post Cp 10 min post Cp 10m post CP 10 m post   TENs R hip                                                     "

## 2024-01-03 ENCOUNTER — APPOINTMENT (OUTPATIENT)
Dept: LAB | Facility: CLINIC | Age: 74
End: 2024-01-03
Payer: MEDICARE

## 2024-01-03 DIAGNOSIS — M25.551 RIGHT HIP PAIN: ICD-10-CM

## 2024-01-03 LAB
CRP SERPL QL: 2 MG/L
ERYTHROCYTE [SEDIMENTATION RATE] IN BLOOD: 49 MM/HOUR (ref 0–29)

## 2024-01-03 PROCEDURE — 36415 COLL VENOUS BLD VENIPUNCTURE: CPT

## 2024-01-03 PROCEDURE — 85652 RBC SED RATE AUTOMATED: CPT

## 2024-01-03 PROCEDURE — 86140 C-REACTIVE PROTEIN: CPT

## 2024-01-04 ENCOUNTER — OFFICE VISIT (OUTPATIENT)
Dept: OBGYN CLINIC | Facility: MEDICAL CENTER | Age: 74
End: 2024-01-04
Payer: MEDICARE

## 2024-01-04 ENCOUNTER — APPOINTMENT (OUTPATIENT)
Dept: PHYSICAL THERAPY | Facility: CLINIC | Age: 74
End: 2024-01-04
Payer: MEDICARE

## 2024-01-04 VITALS
HEIGHT: 66 IN | DIASTOLIC BLOOD PRESSURE: 96 MMHG | SYSTOLIC BLOOD PRESSURE: 163 MMHG | WEIGHT: 169 LBS | BODY MASS INDEX: 27.16 KG/M2 | HEART RATE: 91 BPM

## 2024-01-04 DIAGNOSIS — Z96.641 HISTORY OF RIGHT HIP REPLACEMENT: ICD-10-CM

## 2024-01-04 DIAGNOSIS — S76.019A RUPTURE OF TENDON OF HIP ABDUCTOR: Primary | ICD-10-CM

## 2024-01-04 PROCEDURE — 99214 OFFICE O/P EST MOD 30 MIN: CPT | Performed by: STUDENT IN AN ORGANIZED HEALTH CARE EDUCATION/TRAINING PROGRAM

## 2024-01-04 NOTE — PROGRESS NOTES
Hip Follow up Office Note    Assessment:     1. History of right hip replacement          Plan:   Diagnoses and all orders for this visit:    History of right hip replacement  -     FL guided needle plac bx/asp/inj; Future        74 y/o female with right hip abductor tear with some fatty infiltration in the gluteus minimus and medius.  Updated MRI of the right hip reviewed today showing that there is now some increased fatty infiltration of glut med/minimus musculature.  Discussed today that the part of the muscle that has atrophied we will not be able to regenerate. The portion that continues without fatty atrophy may hypertrophy to help compensate with repair. Due to elevated ESR on work-up her next step is aspiration with Synovasure to assure no concomitant infection. F/u after aspiration to review results and check progress with PT.      Subjective:     Patient ID: RACHNA Pulido is a 73 y.o. female.  Chief Complaint: right hip pain  HPI:   Patient is a 71 y/o female who presents today for a follow up evaluation of her RIGHT hip.  She has a history of a Right KULWANT with Dr. Summers in 2021 and has had pain since.  She had pain in groin for long time before hip replacement. The hip replacement did help her but about 6 months afterwards she had onset right hip pain laterally based without significant radiation or numbness or tingling. Pain worse with use and direct contact to lateral hip or with walking long distances. The pain and limp has significantly limited her ability to ambulate and perform ADL's. She denies fevers chills nausea vomiting. Pain did not respond to steroid injections or oral medications.   At last visit we discussed that she had a previous MRI showing an abductor repair with no fatty infiltration.  An updated MRI was ordered to eval for worsening abductor tear and atrophy.  She is here today to discuss results.  ESR and sed rate were also ordered.      Allergy:  Allergies   Allergen Reactions     Amoxicillin Abdominal Pain, Other (See Comments), Diarrhea, GI Intolerance and Nausea Only    Azithromycin Abdominal Pain and Other (See Comments)     Medications:  all current active meds have been reviewed  Past Medical History:  Past Medical History:   Diagnosis Date    Chronic pain disorder     Disease of thyroid gland     GERD (gastroesophageal reflux disease)     Hyperlipidemia     Hypertension     Seasonal allergies      Past Surgical History:  Past Surgical History:   Procedure Laterality Date    BACK SURGERY  2019    laminectomy    COLONOSCOPY      JOINT REPLACEMENT Right     RTHR    NERVE BLOCK Right 8/26/2021    Procedure: BLOCK MEDIAL BRANCH L3, L4, L5;  Surgeon: Vel Hou MD;  Location: [facility];  Service: Pain Management     NERVE BLOCK Right 9/21/2021    Procedure: BLOCK MEDIAL BRANCH Right L3, L4, L5 #2;  Surgeon: Vel Hou MD;  Location: [facility];  Service: Pain Management     RADIOFREQUENCY ABLATION Right 10/14/2021    Procedure: L3 L4 L5 RADIO FREQUENCY ABLATION;  Surgeon: Vel Hou MD;  Location: [facility];  Service: Pain Management      Family History:  Family History   Problem Relation Age of Onset    No Known Problems Mother     No Known Problems Father      Social History:  Social History     Substance and Sexual Activity   Alcohol Use Yes    Comment: At holidays and celebrations     Social History     Substance and Sexual Activity   Drug Use Never     Social History     Tobacco Use   Smoking Status Never   Smokeless Tobacco Never           ROS:  General: Per HPI  Skin: Negative, except if noted below  HEENT: Negative  Respiratory: Negative  Cardiovascular: Negative  Gastrointestinal: Negative  Urinary: Negative  Vascular: Negative  Musculoskeletal: Positive per HPI   Neurologic: Positive per HPI  Endocrine: Negative    Objective:  BP Readings from Last 1 Encounters:   01/04/24 163/96      Wt Readings from Last 1 Encounters:   01/04/24 76.7 kg (169 lb)     "    Respiratory:   non-labored respirations    Lymphatics:  no palpable lymph nodes    Gait and Station:   Trendelenburg gait    Neurologic:   Alert and oriented times 3  Patient with normal sensation except as noted below  Deep tendon reflexes 2+ except as noted in MSK exam    Right Lower Extremity:    Right Hip     Inspection: skin inciision well healed. Approx 1cm increase leg length on right compared to left.    Range of Motion: 0-100 flexion with pain lateral right hip with deep flexion and external rotation.     - log roll    + Trendelenburg sign    Motor: 5/5 Q/HS/TA/GS/P    Pulses: 2+ DP     SILT DP/SP/S/S/TN    Imaging:  MRI:   IMPRESSION:  1. Partial insertional tears right gluteus minimus and medius tendons similar to prior examination with fatty muscular infiltration indicating chronicity.  2. Expected postoperative appearance after total right hip arthroplasty without complication.    BMI:   Estimated body mass index is 27.28 kg/m² as calculated from the following:    Height as of this encounter: 5' 6\" (1.676 m).    Weight as of this encounter: 76.7 kg (169 lb).  BSA:   Estimated body surface area is 1.86 meters squared as calculated from the following:    Height as of this encounter: 5' 6\" (1.676 m).    Weight as of this encounter: 76.7 kg (169 lb).           Scribe Attestation      I,:  Kenia Mott PA-C am acting as a scribe while in the presence of the attending physician.:       I,:  Manuel Garcia DO personally performed the services described in this documentation    as scribed in my presence.:            "

## 2024-01-04 NOTE — PROGRESS NOTES
Daily Note     Today's date: 2024  Patient name: RAHCNA Pulido  : 1950  MRN: [id number]  Referring provider: Lexi Green MD  Dx:   Encounter Diagnosis     ICD-10-CM    1. Rupture of hip abductor tendon  S76.019A       2. Sacroiliitis (HCC)  M46.1       3. Piriformis syndrome, right  G57.01       4. Right hip pain  M25.551       5. Iliotibial band syndrome, right  M76.31       6. Painful orthopaedic hardware (HCC)  T84.84XA       7. Right lumbar radiculopathy  M54.16                      Subjective: The patient states that she had seen the doctor yesterday.  He had talked to her about getting a tendon repair.  She will be going to see Dr. Pelletier on 1/15 about her hip also.  She is also going to get fluid extracted from her hip and will be seeing Dr. Garcia again after this procedure.          Objective: See treatment diary below.        Assessment: Tolerated treatment well with no increase in pain noted during session.  Weakness is noted t/o her hip and glutes and she reports fatigue with certain TE.  Muscle tightness is noted along R IT Band and piriformis with manual therapy.  She would benefit from continued PT to help promote improved function and mobility.  Patient would benefit from continued PT      Plan: Continue per plan of care.      Precautions: Lumbar surgery, R THR, L hip OA     Daily Treatment Diary:      Initial Evaluation Date: 10/24/23                                       POC 1/15/2024  Compliance 24   Visit Number 11 12 13 14 15 16  7  8  9  10   Re-Eval  Y                Foto Captured Y       Y      Y          24   Manual                 R SI, hip --> R Glut, piri, ITB 12 min R Glut, piri, ITB 12 min G glut, ITB, pirri 10m G glut, ITB, pirri 10m R glut, ITB, piri 10'  10'  thoracic 10 min  R glutes/piri and thoracic 15 min  R Glut, piri, ITB   PROM R  "hip extension L S/L    3m 3m 3 min           AA hip abd  from add to end range L S/L//mery holds in abd knee ext 2 x 10, 10 x 5\" multiple angles 2 x 10, 10 x 5\" multiple angles 2x10// np d/t fatigue 2x10,//  10x5\" holds multiangles 2 x 10// 10 x 5-10\" holds x 2 2x10//  5-10\" 10x 2  3 x 30\" supine  3 x 30\" supine  -  3 x 30     Ther-Ex                 Dead bugs 10x 5\" 20x 2-3\" 20x 3\" 20x3\" 20x3\" 3\"x20  10x 2\" dead bugs  20x 2\" dead bugs  20x 2\" dead bugs  20x2\"  Dead bugs   LTR short arc 10x 5\"x10 5\"x10 5\"x10 5\" x  10 5\"x10 5\"x10  5\"x10  5\"x10  5\"x10   Fire hydrant L S/L modified 2 x 10 2x10 2x10 Clamshell//fire hydr 20x  GTB clam shells 2 x 10, 2\"//fire hydrants 20x GTB clam shells 2x10, 2\"// fire hydrants 20x  2# 2x10 ea stand         BLE bridge with TA and BTB abd  2 x 10, 3\" 2x10 3\" 2x10 3\" 20x 3\"-on heels- glut activ 20x on heels glut activ 20x3\" 20x on heels glut activ 3\"x20 Fire hydrants  1# 2 x 10 L S/L Fire hydrants  1# 2 x 10 L S/L S/L clams 2x10 Fire hydrants 2 x 10 L S/L -   U bridging -opp hip/knee at 90/90 2 x 10 ea 2x10 ea 2x10 ea 20x ea-on heels glut activ 20x on heels glut activ 20x on heels glut activ    open book 5\"x10 ea  open book 5\"x10 ea D/c   Standing hip abd  ext, flex PREs 3# 10x each    --> 3# 10x each dir B 3# 10x each dir B 3# 15x ea dir B 3# x 15 ea dir 3# 15x ea dir           Seated TB ER short arc R LE [place] 2 x 10 Orange 2 x 10 Orange 2 x 10 GrTB 2 x 10 GTB 2 x 10 GTB 2x10  10x 5\"  5\"x10  5\"x10             standing red tband 15x 5\" cues for equal weight distrib  np               10x ea U bridge  10x ea U bridge  10x ea U bridge  2 x 10 RLE bridge with TA                    nustep        L2 10 min  np       Neuro Re-Ed                 Mini squats   10x 10x 10x 10x 10x 10x        SLB Each LE 3 x 10-20 seconds HR A   RLE mini squat with L hip abd, ext, and flexion taps 10 x ea 10x ea 10x ea 10x ea 15 x ea 15x ea  5\"X20 5\"x20 Ball sqz 5\" x 2m   SLB either LE   3 x 10-15 \" posture cues 3 " "x 10-15 \" posture cues 3 x 10-15 \" posture cues 3x10-15\" posture cues 3 x 15\"- posture cues AirEx 15\"x3 posture cues AirEx  GTB 5\"x20 GTB 5\"x20 [employer] 5\" x 20   Lunges with TA     2 laps limited by R Knee pain NP       Step ups/lateral     Trial step up limited by R knee pain 8\" and 4\"     6\" 10 x each LE-noted R knee pain as limiting   Lateral stepping             hurdles             Ther-Act                  Stair amb recip x 2 4\", BHR A                                        Modalities                 CP/HP R hip  Cp and home tens unit 10 min Cp 10 min 5m CP R hip 10m CP R Knee and R buttock/hip 10' CP R knee and R buttock/hip Cp 10 min post Cp 10 min post Cp 10m post CP 10 m post   TENs R hip                                                       "

## 2024-01-05 ENCOUNTER — OFFICE VISIT (OUTPATIENT)
Dept: PHYSICAL THERAPY | Facility: CLINIC | Age: 74
End: 2024-01-05
Payer: MEDICARE

## 2024-01-05 DIAGNOSIS — M46.1 SACROILIITIS (HCC): ICD-10-CM

## 2024-01-05 DIAGNOSIS — M76.31 ILIOTIBIAL BAND SYNDROME, RIGHT: ICD-10-CM

## 2024-01-05 DIAGNOSIS — S76.019A RUPTURE OF HIP ABDUCTOR TENDON: Primary | ICD-10-CM

## 2024-01-05 DIAGNOSIS — T84.84XA PAINFUL ORTHOPAEDIC HARDWARE (HCC): ICD-10-CM

## 2024-01-05 DIAGNOSIS — G57.01 PIRIFORMIS SYNDROME, RIGHT: ICD-10-CM

## 2024-01-05 DIAGNOSIS — M54.16 RIGHT LUMBAR RADICULOPATHY: ICD-10-CM

## 2024-01-05 DIAGNOSIS — M25.551 RIGHT HIP PAIN: ICD-10-CM

## 2024-01-05 PROCEDURE — 97112 NEUROMUSCULAR REEDUCATION: CPT | Performed by: PHYSICAL THERAPIST

## 2024-01-05 PROCEDURE — 97110 THERAPEUTIC EXERCISES: CPT | Performed by: PHYSICAL THERAPIST

## 2024-01-05 PROCEDURE — 97140 MANUAL THERAPY 1/> REGIONS: CPT | Performed by: PHYSICAL THERAPIST

## 2024-01-09 ENCOUNTER — TELEPHONE (OUTPATIENT)
Dept: OBGYN CLINIC | Facility: HOSPITAL | Age: 74
End: 2024-01-09

## 2024-01-09 ENCOUNTER — OFFICE VISIT (OUTPATIENT)
Dept: PHYSICAL THERAPY | Facility: CLINIC | Age: 74
End: 2024-01-09
Payer: MEDICARE

## 2024-01-09 DIAGNOSIS — T84.84XA PAINFUL ORTHOPAEDIC HARDWARE (HCC): ICD-10-CM

## 2024-01-09 DIAGNOSIS — M54.16 RIGHT LUMBAR RADICULOPATHY: ICD-10-CM

## 2024-01-09 DIAGNOSIS — M25.551 RIGHT HIP PAIN: ICD-10-CM

## 2024-01-09 DIAGNOSIS — M46.1 SACROILIITIS (HCC): ICD-10-CM

## 2024-01-09 DIAGNOSIS — G57.01 PIRIFORMIS SYNDROME, RIGHT: ICD-10-CM

## 2024-01-09 DIAGNOSIS — M76.31 ILIOTIBIAL BAND SYNDROME, RIGHT: ICD-10-CM

## 2024-01-09 DIAGNOSIS — S76.019A RUPTURE OF HIP ABDUCTOR TENDON: Primary | ICD-10-CM

## 2024-01-09 PROCEDURE — 97112 NEUROMUSCULAR REEDUCATION: CPT

## 2024-01-09 PROCEDURE — 97110 THERAPEUTIC EXERCISES: CPT

## 2024-01-09 PROCEDURE — 97140 MANUAL THERAPY 1/> REGIONS: CPT

## 2024-01-09 NOTE — PROGRESS NOTES
Daily Note     Today's date: 2024  Patient name: RACHNA Pulido  : 1950  MRN: [id number]  Referring provider: Lexi Green MD  Dx:   Encounter Diagnosis     ICD-10-CM    1. Rupture of hip abductor tendon  S76.019A       2. Iliotibial band syndrome, right  M76.31       3. Painful orthopaedic hardware (HCC)  T84.84XA       4. Sacroiliitis (HCC)  M46.1       5. Piriformis syndrome, right  G57.01       6. Right hip pain  M25.551       7. Right lumbar radiculopathy  M54.16                      Subjective: Patient reports her hip has been about the same. She notes she had an appointment with Dr Garcia last Thursday to discuss MRI finding and if she were a good candidate for surgery. He expressed there were slight changed in MRI findings since last MRI. Jose says he feels she is still a good candidate for surgical intervention. He then ordered various pre-op tests including blood work. She notes she had this blood work done and a level came back high, indicating infection. She believes with is because she has a cold. Jose had ordered her to get an aspiration of the R hip just so they can be sure there is no infection in the hip. She notes she also has an appointment set up with Dr Pelletier locally to see what input he has as he has performed this operation more routinely than Jose.      Objective: See treatment diary below      Assessment: Tolerated treatment well without complaint. Some fatigue with SL series. Patient would benefit from continued PT to increase R hip strength and mobility for improved function in daily activities.       Plan: Continue per plan of care.      Precautions: Lumbar surgery, R THR, L hip OA     Daily Treatment Diary:      Initial Evaluation Date: 10/24/23                                       POC 1/15/2024  Compliance 24      Visit Number 11 12 13 14 15 16 17      Re-Eval  Y            Foto Captured Y                     "12/18 12/29 1/2/24 1/5/24 1/9      Manual             R SI, hip --> R Glut, piri, ITB 12 min R Glut, piri, ITB 12 min G glut, ITB, pirri 10m G glut, ITB, pirri 10m R glut, ITB, piri 10' R glut, ITB, piri 10'      PROM R hip extension L S/L    3m 3m 3 min 3 min      A hip abd  from add to end range L S/L//mery holds in abd knee ext 2 x 10, 10 x 5\" multiple angles 2 x 10, 10 x 5\" multiple angles 2x10// np d/t fatigue 2x10,//  10x5\" holds multiangles 2 x 10// 10 x 5-10\" holds x 2 2x10//  5-10\" 10x 2 mery holds in abd knee ext 5-10\" x2      Ther-Ex             Dead bugs 10x 5\" 20x 2-3\" 20x 3\" 20x3\" 20x3\" 3\"x20 3\"x20      LTR short arc 10x 5\"x10 5\"x10 5\"x10 5\" x  10 5\"x10 5\"x10      SL hip abd             Fire hydrant L S/L modified 2 x 10 2x10 2x10 Clamshell//fire hydr 20x  GTB clam shells 2 x 10, 2\"//fire hydrants 20x GTB clam shells 2x10, 2\"// fire hydrants 20x BTB clam shells 2x10, 2\"// fire hydrants 20x      BLE bridge with TA and BTB abd  2 x 10, 3\" 2x10 3\" 2x10 3\" 20x 3\"-on heels- glut activ 20x on heels glut activ 20x3\" 20x on heels glut activ 3\"x20 20x on heels glut activ 3\"x20      U bridging -opp hip/knee at 90/90 2 x 10 ea 2x10 ea 2x10 ea 20x ea-on heels glut activ 20x on heels glut activ 20x on heels glut activ 20x on heels glut activ      Standing hip abd  ext, flex PREs 3# 10x each    --> 3# 10x each dir B 3# 10x each dir B 3# 15x ea dir B 3# x 15 ea dir 3# 15x ea dir 3# 15x ea dir      Seated TB ER short arc R LE [place] 2 x 10 Orange 2 x 10 Orange 2 x 10 GrTB 2 x 10 GTB 2 x 10 GTB 2x10 GTB 2x10                                             nustep             Neuro Re-Ed             Mini squats   10x 10x 10x 10x 10x 10x x20      RLE mini squat with L hip abd, ext, and flexion taps 10 x ea 10x ea 10x ea 10x ea 15 x ea 15x ea 15x ea      SLB either LE   3 x 10-15 \" posture cues 3 x 10-15 \" posture cues 3 x 10-15 \" posture cues 3x10-15\" posture cues 3 x 15\"- posture cues AirEx 15\"x3 posture cues AirEx missed    " "  Lunges with TA     2 laps limited by R Knee pain NP       Step ups/lateral     Trial step up limited by R knee pain 8\" and 4\"        Lateral stepping             hurdles             Ther-Act                  Stair amb recip x 2 4\", BHR A                                    Modalities             CP/HP R hip  Cp and home tens unit 10 min Cp 10 min 5m CP R hip 10m CP R Knee and R buttock/hip 10' CP R knee and R buttock/hip 10 min R hip      TENs R hip                                                         "

## 2024-01-09 NOTE — TELEPHONE ENCOUNTER
[facility] [place]    Doctor: Jose    Reason for call: The radiology team isn't able to see the last 2 pages of the order that was sent over to them, specifically the pages regarding the Synovasure. They are requesting that it be sent again. Fax# [phone]      Call back#: [phone]

## 2024-01-10 NOTE — TELEPHONE ENCOUNTER
"[facility] [place]     Doctor: Jose     Reason for call: Would like to speak to Kenia Mott or shayne CONDON concerning test that was ordered for this patient.      States patient lives in [place], patient would like to have the test done locally. They collect it and send it out but it is not name brand. It is the same testing but the brand name is not \"Synovasore\" It is sent by [facility] to [facility] for the testing for the same thing. Just wanted to make you aware of that before they schedule [employer]  It is the same thing just goes under something different with them.    Please call back with any questions.    Call back#: [phone]        "

## 2024-01-10 NOTE — TELEPHONE ENCOUNTER
Can you please call her back and tell her that whatever they test have is fine and they can schedule the patient?  Thank you!

## 2024-01-11 ENCOUNTER — OFFICE VISIT (OUTPATIENT)
Dept: PHYSICAL THERAPY | Facility: CLINIC | Age: 74
End: 2024-01-11
Payer: MEDICARE

## 2024-01-11 DIAGNOSIS — S76.019A RUPTURE OF HIP ABDUCTOR TENDON: Primary | ICD-10-CM

## 2024-01-11 DIAGNOSIS — M54.16 RIGHT LUMBAR RADICULOPATHY: ICD-10-CM

## 2024-01-11 DIAGNOSIS — M76.31 ILIOTIBIAL BAND SYNDROME, RIGHT: ICD-10-CM

## 2024-01-11 DIAGNOSIS — T84.84XA PAINFUL ORTHOPAEDIC HARDWARE (HCC): ICD-10-CM

## 2024-01-11 DIAGNOSIS — G57.01 PIRIFORMIS SYNDROME, RIGHT: ICD-10-CM

## 2024-01-11 DIAGNOSIS — M25.551 RIGHT HIP PAIN: ICD-10-CM

## 2024-01-11 DIAGNOSIS — M46.1 SACROILIITIS (HCC): ICD-10-CM

## 2024-01-11 PROCEDURE — 97112 NEUROMUSCULAR REEDUCATION: CPT

## 2024-01-11 PROCEDURE — 97110 THERAPEUTIC EXERCISES: CPT

## 2024-01-11 PROCEDURE — 97140 MANUAL THERAPY 1/> REGIONS: CPT

## 2024-01-11 NOTE — PROGRESS NOTES
"Daily Note     Today's date: 2024  Patient name: RACHNA Pulido  : 1950  MRN: [id number]  Referring provider: Lexi Green MD  Dx:   Encounter Diagnosis     ICD-10-CM    1. Rupture of hip abductor tendon  S76.019A       2. Piriformis syndrome, right  G57.01       3. Right hip pain  M25.551       4. Iliotibial band syndrome, right  M76.31       5. Painful orthopaedic hardware (HCC)  T84.84XA       6. Right lumbar radiculopathy  M54.16       7. Sacroiliitis (HCC)  M46.1                      Subjective: Patient notes she was able to get the aspiration scheduled for tomorrow.      Objective: See treatment diary below      Assessment: Tolerated treatment well without complaint. Patient would benefit from continued PT to increase R hip strength for improved function in ADLs.      Plan: Continue per plan of care.      Precautions: Lumbar surgery, R THR, L hip OA     Daily Treatment Diary:      Initial Evaluation Date: 10/24/23                                       POC 1/15/2024  Compliance 24     Visit Number [id number] 13 14 15 16 17 18     Re-Eval  Y            Foto Captured Y                   24     Manual             R SI, hip --> R Glut, piri, ITB 12 min R Glut, piri, ITB 12 min G glut, ITB, pirri 10m G glut, ITB, pirri 10m R glut, ITB, piri 10' R glut, ITB, piri 10' R glut, ITB, piri 10'     PROM R hip extension L S/L    3m 3m 3 min 3 min 3 min     A hip abd  from add to end range L S/L//mery holds in abd knee ext 2 x 10, 10 x 5\" multiple angles 2 x 10, 10 x 5\" multiple angles 2x10// np d/t fatigue 2x10,//  10x5\" holds multiangles 2 x 10// 10 x 5-10\" holds x 2 2x10//  5-10\" 10x 2 mery holds in abd knee ext 5-10\" x2 mery holds in abd knee ext 5-10\" x2     Ther-Ex             Dead bugs 10x 5\" 20x 2-3\" 20x 3\" 20x3\" 20x3\" 3\"x20 3\"x20 3\"x20     LTR short arc 10x 5\"x10 5\"x10 5\"x10 5\" x  10 5\"x10 5\"x10 5\"x10     SL hip abd " "       2x10     Fire hydrant L S/L modified 2 x 10 2x10 2x10 Clamshell//fire hydr 20x  GTB clam shells 2 x 10, 2\"//fire hydrants 20x GTB clam shells 2x10, 2\"// fire hydrants 20x BTB clam shells 2x10, 2\"// fire hydrants 20x BTB clam shells 2x10, 2\"// fire hydrants 20x     BLE bridge with TA and BTB abd  2 x 10, 3\" 2x10 3\" 2x10 3\" 20x 3\"-on heels- glut activ 20x on heels glut activ 20x3\" 20x on heels glut activ 3\"x20 20x on heels glut activ 3\"x20 20x on heels glut activ 3\"x20     U bridging -opp hip/knee at 90/90 2 x 10 ea 2x10 ea 2x10 ea 20x ea-on heels glut activ 20x on heels glut activ 20x on heels glut activ 20x on heels glut activ 20x on heels glut activ     Standing hip abd  ext, flex PREs 3# 10x each    --> 3# 10x each dir B 3# 10x each dir B 3# 15x ea dir B 3# x 15 ea dir 3# 15x ea dir 3# 15x ea dir 4# 15x ea dir     Seated TB ER short arc R LE [place] 2 x 10 Orange 2 x 10 Orange 2 x 10 GrTB 2 x 10 GTB 2 x 10 GTB 2x10 GTB 2x10 GTB 2x10                                            nustep             Neuro Re-Ed             Mini squats   10x 10x 10x 10x 10x 10x x20 x20     RLE mini squat with L hip abd, ext, and flexion taps 10 x ea 10x ea 10x ea 10x ea 15 x ea 15x ea 15x ea 15x ea B     SLB either LE   3 x 10-15 \" posture cues 3 x 10-15 \" posture cues 3 x 10-15 \" posture cues 3x10-15\" posture cues 3 x 15\"- posture cues AirEx 15\"x3 posture cues AirEx missed 15\"x3 eaposture cues AirEx     Lunges with TA     2 laps limited by R Knee pain NP       Step ups/lateral     Trial step up limited by R knee pain 8\" and 4\"        Lateral stepping             hurdles             Ther-Act                  Stair amb recip x 2 4\", BHR A                                    Modalities             CP/HP R hip  Cp and home tens unit 10 min Cp 10 min 5m CP R hip 10m CP R Knee and R buttock/hip 10' CP R knee and R buttock/hip 10 min R hip 10 min R hip     TENs R hip                                                           "

## 2024-01-16 ENCOUNTER — APPOINTMENT (OUTPATIENT)
Dept: PHYSICAL THERAPY | Facility: CLINIC | Age: 74
End: 2024-01-16
Payer: MEDICARE

## 2024-01-18 ENCOUNTER — EVALUATION (OUTPATIENT)
Dept: PHYSICAL THERAPY | Facility: CLINIC | Age: 74
End: 2024-01-18
Payer: MEDICARE

## 2024-01-18 DIAGNOSIS — M76.31 ILIOTIBIAL BAND SYNDROME, RIGHT: ICD-10-CM

## 2024-01-18 DIAGNOSIS — M46.1 SACROILIITIS (HCC): ICD-10-CM

## 2024-01-18 DIAGNOSIS — M25.551 RIGHT HIP PAIN: ICD-10-CM

## 2024-01-18 DIAGNOSIS — S76.019A RUPTURE OF HIP ABDUCTOR TENDON: Primary | ICD-10-CM

## 2024-01-18 DIAGNOSIS — G57.01 PIRIFORMIS SYNDROME, RIGHT: ICD-10-CM

## 2024-01-18 DIAGNOSIS — M54.16 RIGHT LUMBAR RADICULOPATHY: ICD-10-CM

## 2024-01-18 DIAGNOSIS — T84.84XA PAINFUL ORTHOPAEDIC HARDWARE (HCC): ICD-10-CM

## 2024-01-18 PROCEDURE — 97530 THERAPEUTIC ACTIVITIES: CPT | Performed by: PHYSICAL THERAPIST

## 2024-01-18 NOTE — PROGRESS NOTES
"                                                                                  PT RE- Evaluation - Discharge    Today's date: 2024  Patient name: RACHNA Pulido  : 1950  MRN: [id number]  Referring provider: Lexi Green MD  Dx:   Encounter Diagnosis     ICD-10-CM    1. Rupture of hip abductor tendon  S76.019A       2. Piriformis syndrome, right  G57.01       3. Right hip pain  M25.551       4. Iliotibial band syndrome, right  M76.31       5. Painful orthopaedic hardware (HCC)  T84.84XA       6. Right lumbar radiculopathy  M54.16       7. Sacroiliitis (HCC)  M46.1             Start Time: 1015  Stop Time: 1040  Total time in clinic (min): 25 minutes    Assessment  Assessment details: 74 yo female  reerred to care by Dr. Garcia for care of R dx with right  hip abductor tendon rupture, R hip pain, sacroliliits, and painful orthopedic hardware. Pt has been tx in PT for care of  lumbar spine radiculopathy, ITB syndrome and piriformis syndrome with some positive response but feeling some plateau in progress. Pt notes R hip \"mechanics\", pain and decreased strength are most limiting to daily WB function.  SLB on RLE 10 seconds with sway.  R hip abduction strength 4-/5, R hip extension and flexion ROM limited as per document. Gait dysfunction present with dec. Gait speed, dec stance RLE, trunk flexed with amb. Pt impairments limits her tolerance for walking, standing and  ADLs, homekeeping and walking for exercise and community outings.  She does require A Of  for homekeeping tasks ie vaccuuming. Pt ability to achieve outlined goals may be limited by severity of tear, aggravation of lower back pain should it occur, and  any decreased compliance with HEP should it occur.     Update 24- Pt has continued with care plan for R hip under care of Dr. Garcia. Recent MRI findings indicate tears of gluteus minimus and medius  with fatty atrophy. Pt notes plateau in progress and is deciding if she is " going to move forward with surgical approach for the tears secondary to her plateau in progress with skilled care of PT. Minimal gains with strength of R hip  and exercise tolerances since tx initiation. Discharge pt to HEP at this time for daily completion. Importance of daily completion emphasized to pt.   Impairments: abnormal gait, abnormal or restricted ROM, impaired physical strength, pain with function, weight-bearing intolerance and poor posture   Other impairment: 10 sec RLE with sway, no pain    Symptom irritability: moderatePrognosis details: Chronic pain, tear of gluteus minimus, lumbar fusion, prior injections may impact ability to achieve rehab goals of care    Goals  Goals  STG 2-4 weeks  Increase  R hip single limb stance to 20-30 seconds to improve hip/pelvioc stability with gait-  plateaued at  10-20 seconds  Increase  Core and hip strength by  1/2 MMT  To allow for improved ambulation tolerances and decrased hip pain, no greater than 1-2/10 amb. 40-50 min.with least AD- not achieved  LTG 4-6 weeks:  Increase strength R hip and core mm by 1 MMT to allow for ambulation no to least AD 60 min with pain no greater than 2/10 R hip not achieved  Improve SLB RLE to 40-60 seconds to allow for improved endurance stability of hip/pelvis with ambulation not achieved- progressing  Patient I with final HEP and self managements to self manage and reduce likelihood of recurrence following d/c to HEP - achieved I with HEP, some dec in motivation with HEP reported by pt but was educated in importance of compliance      Plan  Plan details: Discharge today secondary to plateau in status and possibility of upcoming surgical approach. Will cont with HEP daily.   Planned therapy interventions: home exercise program, therapeutic activities and patient education  Plan of Care beginning date: 1/18/2024  Plan of Care expiration date: 1/18/2024  Treatment plan discussed with: patient        Subjective Evaluation    History  of Present Illness  Mechanism of injury:  Update 12/11/23- Pt returns for care today with orders from Dr. Negrete for care of R hip dx of rupture of R hip abductor tendon, painful orthopaedic hardware, R hip pain and sacroilitis.  She was to see Dr. Garcia for second opinion of R hip abductor muscle tear and recommended approach regarding surgery. Pt. Is scheduled for MRI  later in December.  She is ordered for PT to R hip and sacroiliac region. Pt notes she feels some plateau in progress since initial levels with onset of care in PT.  However she does admit to not being very active and found she was able to increase her waling tolerance over weekend and surprised herself.  She notes increase awareness to increase her walking as tolerated. She notes she is compliant with her HEP most days. She notes difficulty with standing tolerances, walking tolerances, stair climbing and functional WB.  Sh enotes pian is worse in the AM OOB and improves with use of medications. Pt. Notes pain persists in R posterolateral hip which increases with increase in weight bearing activity. She notes decrease in motivation for ambulation as her hip fatigues and gets sore even with use of cane. She notes not being able to participate with decorating  and homekeeping, family outings as she would like. She notes thoracic and L hip pain  and at times lumbar pain, all do contribute to her decrease in activity tolerances but feels the R hip is the primary reason for her decrease in WB function. Pt notes she continues to use heel lift,  AD of cane, salon pas, arnica gel, tylenol, mobic or celebrex, CP. No recent  injections in either hip. But + PMH Of injections.    Pt medications, PMH and allergies reviewed in chart and with  patient today.    Update 1/18/24- Pt has undergone MRI / hip aspiration and is considering a surgical approach for gluteus minimus/medius repair. She notes PT  has helped some with function and pain  and has encouraged her  to resume exercises. Pt notes pain and fatigue does cont to limit her with weight bearing function and she finds she has plateaued with her progress of weight bearing function of standing and walking and is considering surgical approach.           Recurrent probem    Quality of life: good    Patient Goals  Patient goal: decrease pain medications to manage her pain, to stand to complete cooking, cleaning, ambulation longer and on stairs improved, to get rid of cane  Pain  Current pain ratin  At best pain ratin  At worst pain ratin  Location: R buttock, gluteal region and lateral hip, at times thoracic  and lumbar regions  Quality: sharp, discomfort and dull ache  Relieving factors: relaxation, rest, ice and medications  Aggravating factors: standing, walking, stair climbing and lifting  Progression: no change    Social Support  Steps to enter house: yes  Stairs in house: yes   Lives in: multiple-level home  Lives with: spouse    Employment status: not working (retired)  Hand dominance: right      Diagnostic Tests  MRI studies: abnormal (Partial insertional tears right gluteus minimus and medius tendons similar to prior examination with fatty muscular infiltration indicating chronicity. 2. Expected postoperative appearance after total right hip arthroplasty without complication.)  Treatments  Previous treatment: physical therapy, medication and injection treatment  Current treatment: medication and physical therapy        Objective     Static Posture     Comments  Increase in RLE leg length, R knee flexed during stance, WB on lateral aspect of L foot during stance.     Postural Observations  Seated posture: fair  Standing posture: fair  Correction of posture: makes symptoms worse    Additional Postural Observation Details  Minimal to moderate forward head posturing in seated and standing as well as with ambulation. Pt with increasing lumbar extension toward neutral increases pain in R lower lumbar region  and R buttock lateral hip with out radiation into RLE.     Palpation   Left   No palpable tenderness to the erector spinae.     Right   Hypertonic in the erector spinae.   Tenderness of the erector spinae, gluteus medius and lumbar paraspinals.   Trigger point to erector spinae.     Additional Palpation Details  Palpation of R TFL and ITB  pain reported, hypertonic  Palpation of R piriformis m hypertonic and painful    Tenderness     Right Hip   Tenderness in the PSIS and greater trochanter.     Neurological Testing     Sensation     Lumbar   Left   Intact: light touch    Right   Intact: light touch    Reflexes   Left   Patellar (L4): normal (2+)  Achilles (S1): trace (1+)    Right   Patellar (L4): normal (2+)  Achilles (S1): trace (1+)    Additional Neurological Details  SLB  RLE with sway, negative trendelenberg, tolerates 10 seconds prior to UE A on HR, denies any pain with this  task.     Active Range of Motion   Cervical/Thoracic Spine       Thoracic    Flexion:  Restriction level: minimal  Extension:  with pain Restriction level: maximal  Left lateral flexion:  Restriction level: moderate  Right lateral flexion:  Restriction level: moderate  Left rotation:  Restriction level: moderate  Right rotation:  Restriction level: moderate    Lumbar   Flexion:  Restriction level: minimal  Extension:  with pain Restriction level: maximal  Left lateral flexion:  Restriction level: moderate  Right lateral flexion:  with pain Restriction level: maximal    Right Hip   Flexion: 95 degrees   Extension: 5 degrees   Abduction: 25 degrees   Adduction: 10 degrees   External rotation (90/90): 25 degrees     Passive Range of Motion     Additional Passive Range of Motion Details  Flexibility of R ITB  WFLs without pain provocation R hip , LE    Strength/Myotome Testing     Left Hip   Planes of Motion   Flexion: 4+  Extension: 4  Abduction: 4  Adduction: 4+  External rotation: 4+  Internal rotation: 4+    Right Hip   Planes of Motion  "  Flexion: 4+  Extension: 4  Abduction: 4- (4-/5 to 4/5 , dec in endurance)  Adduction: 4+  External rotation: 4-  Internal rotation: 4    Left Knee   Flexion: 4+  Extension: 4+    Right Knee   Flexion: 4+  Extension: 4+    Left Ankle/Foot   Dorsiflexion: 4+  Plantar flexion: 4+  Inversion: 4+  Eversion: 4+  Great toe flexion: 4+  Great toe extension: 4+    Right Ankle/Foot   Dorsiflexion: 4+  Plantar flexion: 4+  Eversion: 4+  Great toe flexion: 4+  Great toe extension: 4+    Additional Strength Details  TA activation fair to good  hooklying  and seated    Tests     Lumbar     Left   Negative passive SLR.     Right   Negative passive SLR.     Right Hip   Positive long sit.     Ambulation     Observational Gait   Gait: antalgic   Decreased walking speed, stride length and right stance time.     Additional Observational Gait Details  1/18/23- rigid trunk and no RUE arm swing with amb. Slight trunk flexion demonstrated. Negative trendelenberg gait      Flowsheet Rows      Flowsheet Row Most Recent Value   PT/OT G-Codes    FOTO information reviewed Yes             Precautions: Lumbar surgery, R THR, L hip OA     Daily Treatment Diary:      Initial Evaluation Date: 10/24/23                                       POC 1/15/2024  Compliance 12/11 12/14 12/18 12/29 1/2/24 1/5/24 1/9 1/11 1/18    Visit Number [id number] 12 13 14 15 16 17 18 19    Re-Eval  Y            Foto Captured Y                   12/11 12/14 12/18 12/29 1/2/24 1/5/24 1/9 1/11 1/18    Manual             R SI, hip --> R Glut, piri, ITB 12 min R Glut, piri, ITB 12 min G glut, ITB, pirri 10m G glut, ITB, pirri 10m R glut, ITB, piri 10' R glut, ITB, piri 10' R glut, ITB, piri 10'     PROM R hip extension L S/L    3m 3m 3 min 3 min 3 min     A hip abd  from add to end range L S/L//mery holds in abd knee ext 2 x 10, 10 x 5\" multiple angles 2 x 10, 10 x 5\" multiple angles 2x10// np d/t fatigue 2x10,//  10x5\" holds multiangles 2 x 10// 10 x 5-10\" holds x 2 2x10//  5-10\" " "10x 2 mery holds in abd knee ext 5-10\" x2 mery holds in abd knee ext 5-10\" x2     Ther-Ex             Dead bugs 10x 5\" 20x 2-3\" 20x 3\" 20x3\" 20x3\" 3\"x20 3\"x20 3\"x20     LTR short arc 10x 5\"x10 5\"x10 5\"x10 5\" x  10 5\"x10 5\"x10 5\"x10     SL hip abd        2x10     Fire hydrant L S/L modified 2 x 10 2x10 2x10 Clamshell//fire hydr 20x  GTB clam shells 2 x 10, 2\"//fire hydrants 20x GTB clam shells 2x10, 2\"// fire hydrants 20x BTB clam shells 2x10, 2\"// fire hydrants 20x BTB clam shells 2x10, 2\"// fire hydrants 20x     BLE bridge with TA and BTB abd  2 x 10, 3\" 2x10 3\" 2x10 3\" 20x 3\"-on heels- glut activ 20x on heels glut activ 20x3\" 20x on heels glut activ 3\"x20 20x on heels glut activ 3\"x20 20x on heels glut activ 3\"x20     U bridging -opp hip/knee at 90/90 2 x 10 ea 2x10 ea 2x10 ea 20x ea-on heels glut activ 20x on heels glut activ 20x on heels glut activ 20x on heels glut activ 20x on heels glut activ     Standing hip abd  ext, flex PREs 3# 10x each    --> 3# 10x each dir B 3# 10x each dir B 3# 15x ea dir B 3# x 15 ea dir 3# 15x ea dir 3# 15x ea dir 4# 15x ea dir     Seated TB ER short arc R LE [place] 2 x 10 Orange 2 x 10 Orange 2 x 10 GrTB 2 x 10 GTB 2 x 10 GTB 2x10 GTB 2x10 GTB 2x10                                            nustep             Neuro Re-Ed             Mini squats   10x 10x 10x 10x 10x 10x x20 x20     RLE mini squat with L hip abd, ext, and flexion taps 10 x ea 10x ea 10x ea 10x ea 15 x ea 15x ea 15x ea 15x ea B     SLB either LE   3 x 10-15 \" posture cues 3 x 10-15 \" posture cues 3 x 10-15 \" posture cues 3x10-15\" posture cues 3 x 15\"- posture cues AirEx 15\"x3 posture cues AirEx missed 15\"x3 eaposture cues AirEx     Lunges with TA     2 laps limited by R Knee pain NP       Step ups/lateral     Trial step up limited by R knee pain 8\" and 4\"        Lateral stepping             hurdles             Ther-Act                  Stair amb recip x 2 4\", BHR A     25m PC review of HEP, self mgmts, activity mods, " tx plan and discharge                               Modalities             CP/HP R hip  Cp and home tens unit 10 min Cp 10 min 5m CP R hip 10m CP R Knee and R buttock/hip 10' CP R knee and R buttock/hip 10 min R hip 10 min R hip     TENs R hip

## 2024-01-23 ENCOUNTER — APPOINTMENT (OUTPATIENT)
Dept: PHYSICAL THERAPY | Facility: CLINIC | Age: 74
End: 2024-01-23
Payer: MEDICARE

## 2024-01-25 ENCOUNTER — APPOINTMENT (OUTPATIENT)
Dept: PHYSICAL THERAPY | Facility: CLINIC | Age: 74
End: 2024-01-25
Payer: MEDICARE

## 2024-01-30 ENCOUNTER — APPOINTMENT (OUTPATIENT)
Dept: PHYSICAL THERAPY | Facility: CLINIC | Age: 74
End: 2024-01-30
Payer: MEDICARE

## 2024-03-12 NOTE — PROGRESS NOTES
PT Evaluation     Today's date: 3/13/2024  Patient name: RACHNA Pulido  : 1950  MRN: [id number]  Referring provider: Armani Pelletier*  Dx:   Encounter Diagnosis     ICD-10-CM    1. Pain in right hip  M25.551       2. Trochanteric bursitis, right hip  M70.61       3. Status post hip surgery  Z98.890                        Assessment  Assessment details: The patient is a 75 y/o female who presents to PT s/p R hip surgery (R hip trochanteric bursectomy with gluteus medius repair and IT band transfer) on 2/15/24 by Dr. Pelletier.  She has complaints of intermittent pulling/pain in her hip.  She demonstrates deficits with decreased ROM and strength, decreased flexibility, antalgic gait with use of brace and AD, decreased balance and proprioception and edema.  These deficits lead to difficulty with stair negotiation, gait dysfunction and pain with completing her ADLs and tasks at home.  She is I with most tasks but her  has been assisting with LE dressing.  She is also using a shower chair when bathing.  She ambulates with use of brace locked 0-90* and RW for household and community distances.  Slow bashir is noted along with decreased weight shift to RLE in stance phase.  She has difficulty with stair negotiation - has been using the walker and going up and down the steps with non-reciprocal gait pattern.  She also has difficulty sleeping, has been sleeping on the recliner or sofa.  She is not currently driving.  Secondary to above deficits she is limited with her overall mobility and function.  The patient would benefit from continued PT to address deficits and improve function.  Tx to include ROM, stretching, strengthening, modalities, HEP, pt education, postural ed, lifting/body mechanics, neuro re-ed, balance/proprioception Te, MT and equipment.         Impairments: abnormal gait, abnormal or restricted ROM, impaired physical strength, lacks appropriate home exercise program, pain with  function, weight-bearing intolerance and poor posture   Other impairment: decreased flexibility    Symptom irritability: moderateUnderstanding of Dx/Px/POC: good   Prognosis: good    Goals  STGs:  1.  Initiate and complete HEP with verbal cues.  2.  Improve R hip ROM by 5-10 degrees in 4 weeks.  3.  Improve R LE strength by 1/2 grade in 4 weeks.  4.  Decrease R hip pain by 25% in 4 weeks.  LTGs:  1.  Patient to be I with HEP in 12 weeks.  2.  Improve R Hip ROM to WNL t/o in 12 weeks to improve function.  3.  Improve R LE strength to > or = to 4 to 4+/5 t/o in 12 weeks to improve function.  4.  Decrease R hip pain to < or = to 1-2/10 with activity in 12 weeks to improve function.   5.  Patient to ambulate with normalized gait pattern without AD in 12 weeks.  6.  Stair negotiation is improved to PLOF in 12 weeks.  7.  Recreational performance is improved to PLOF in 12 weeks.  8.  ADL performance is improved to PLOF in 12 weeks.            Plan  Plan details: Modalities and therapy interventions prn.    Patient would benefit from: skilled physical therapy  Planned modality interventions: cryotherapy and unattended electrical stimulation  Planned therapy interventions: abdominal trunk stabilization, gait training, functional ROM exercises, stretching, strengthening, neuromuscular re-education, manual therapy, home exercise program, therapeutic exercise, postural training, patient education, self care, balance and balance/weight bearing training  Frequency: 2x week  Duration in weeks: 12  Plan of Care beginning date: 3/13/2024  Plan of Care expiration date: 6/5/2024  Treatment plan discussed with: patient        Subjective Evaluation    History of Present Illness  Mechanism of injury: The was previously seen at this clinic for PT treatment from 10/24/23 to 1/18/24 for her R hip.  After she had been discharged she had seen the doctor and had surgery scheduled.  She had then been scheduled for surgery by Dr. Pelletier.   Surgery was 2/15/24, same day surgery at the surgical center in [place].  She had gone back to see the doctor for one follow up appointment, had her staples removed, per patient the doctor is happy with how she is doing.  She will be going back to see the doctor on 3/25 for her follow up appointment.      From EMR review of PT eval from 10/24/23:  Chronc pain in R lateral hip since about one year duration. She has had PT in the past with improvements- most recently in 3/23 at our facility.  In the past, she has tried heel lifts,  AD of cane, salon pas, arnica gel, tylenol, mobic or celebrex, CP, injections in Each Hip  and lumbar spine, lumbar rhizotomies. Most recent injections into R hip were completed 1/23. Most recent rhizotomy  8/15/23 with some relief reported. Lumbar fusion L4-5 2022.   Pt was recently to Dr. Pelletier for assessment of R gluteus minimus tear and open repair was recommended she reports.  Pt notes she is seeking another opinion from [facility].   Pt notes regression in actvity tolerances and pain increases since June 2023 with out injury or known CADEN, but did travel for family vacation and had increase in pain sx.  She did note increase in sx of pain again with travel in September 2023 but no injury or incident occurring.   Was doing walking program at mall 30-35 minutes with [facility] in winter 2023 when she felt her best and now reports inability to tolerate 30-35 minutes secondary to her pain.    Pt medications, PMH and allergies reviewed in chart and with  patient today.     The patient states that she has intermittent pain/soreness in her hip.  Pain with moving certain directions, also feels a pull in her hip.  Pull is noted around her incision.  She notes some swelling in her leg today, she has been using a compression stocking and ice on her foot to help with the swelling.  She denies any N&T into R LE.      Has not slept in her bed yet, has been on her recliner or sofa.   She had talked  to her PA about her brace, was told to use it only for her first PT appointment.  She notes that she has not been wearing it consistently at home because at her follow up appointment the doctor told her she could stop using it.    Patient Goals  Patient goals for therapy: decreased pain, increased strength, increased motion, independence with ADLs/IADLs and improved balance  Patient goal: decrease pain medications to manage her pain, to stand to complete cooking, cleaning, ambulation longer and on stairs improved  Pain  At best pain ratin  At worst pain rating: 3  Location: R Hip  Quality: pressure, pulling and discomfort  Relieving factors: ice and rest    Social Support  Steps to enter house: yes  1  Stairs in house: yes   Lives in: multiple-level home  Lives with: spouse    Employment status: not working (retired)  Hand dominance: right    Treatments  Previous treatment: physical therapy, medication and injection treatment        Objective     Observations     Right Hip  Positive for incision.     Additional Observation Details  Incision present along R posterior/lateral hip.  Healing, no redness or warmth, no active bleeding or drainage noted.      Neurological Testing     Sensation     Hip   Left Hip   Intact: light touch    Right Hip   Intact: light touch    Reflexes   Left   Patellar (L4): normal (2+)  Achilles (S1): trace (1+)    Right   Patellar (L4): normal (2+)  Achilles (S1): trace (1+)    Active Range of Motion   Left Hip   Flexion: 95 degrees   Abduction: 30 degrees     Right Hip   Flexion: 85 degrees with pain  Abduction: 15 degrees with pain    Additional Active Range of Motion Details  R SLR: 20  L SLR: 50    Strength/Myotome Testing     Left Hip   Planes of Motion   Flexion: 4+  Extension: 4  Abduction: 4  Adduction: 4+  External rotation: 4+  Internal rotation: 4+    Right Hip   Planes of Motion   Flexion: 2-  Abduction: 2-  Adduction: 2  External rotation: 2-  Internal rotation: 2-    Left  Knee   Flexion: 4+  Extension: 4+    Right Knee   Flexion: 3-  Extension: 3-  Quadriceps contraction: fair    Left Ankle/Foot   Dorsiflexion: 4+  Plantar flexion: 4+  Inversion: 4+  Eversion: 4+  Great toe flexion: 4+  Great toe extension: 4+    Right Ankle/Foot   Dorsiflexion: 4+  Plantar flexion: 4+  Eversion: 4+  Great toe flexion: 4+  Great toe extension: 4+    Ambulation   Weight-Bearing Status   Assistive device used: two-wheeled walker    Additional Weight-Bearing Status Details  Using RW for household and community distances.    Has SPC at home.    Has shower chair, has handles for her toilet.    Using Breg hip brace, locked 0-90*.  At her post-op appointment the patient reports the doctor told her she could stop using it but the PA told her to use it for her first PT appointment.      Ambulation: Stairs   Ascend stairs: independent  Pattern: non-reciprocal  Railings: one rail  Descend stairs: independent  Pattern: non-reciprocal  Railings: one rail    Additional Stairs Ambulation Details  Using walker in both hands when going up and down the steps.      Observational Gait   Decreased walking speed and stride length.              Precautions: DOS: 2/15/24 (R hip trochanteric bursectomy with gluteus medius repair and IT band transfer), Lumbar surgery, R THR, HTN      Daily Treatment Diary:      Initial Evaluation Date: 03/13/24  Compliance 3/13                     Visit Number 1                    Re-Eval  IE                 MC   Foto Captured Y                           3/13                     Manual                      R Hip                                                                  Ther-Ex                      NuStep                      Ankle Pumps HEP                     Seated March HEP                     LAQ HEP                     QSets HEP                     Glut Sets HEP                     Heel Slides                      Hip Add w/Ball                                            Reviewed  "and issued HEP Pt ed on HEP, POC                     Neuro Re-Ed                      Mini squats                        Lunges with TA             Step ups/lateral             Sidestepping                          Ther-Act              STS                                                 Modalities                      CP R hip                                                     Access Code: [email or web address]  URL: [email or web address]  Date: 03/13/2024  Prepared by: Dorie    Exercises  - Long Sitting Quad Set  - 1 x daily - 7 x weekly - 2 sets - 10 reps - 3\" hold  - Supine Gluteal Sets  - 1 x daily - 7 x weekly - 2 sets - 10 reps - 3\" hold  - Seated Gluteal Sets  - 1 x daily - 7 x weekly - 2 sets - 10 reps - 3\" hold  - Seated Long Arc Quad  - 1 x daily - 7 x weekly - 2 sets - 10 reps - 3\" hold  - Seated March  - 1 x daily - 7 x weekly - 2 sets - 10 reps  - Supine Ankle Pumps  - 1 x daily - 7 x weekly - 2 sets - 10 reps    "

## 2024-03-13 ENCOUNTER — EVALUATION (OUTPATIENT)
Dept: PHYSICAL THERAPY | Facility: CLINIC | Age: 74
End: 2024-03-13
Payer: MEDICARE

## 2024-03-13 DIAGNOSIS — Z98.890 STATUS POST HIP SURGERY: ICD-10-CM

## 2024-03-13 DIAGNOSIS — M25.551 PAIN IN RIGHT HIP: Primary | ICD-10-CM

## 2024-03-13 DIAGNOSIS — M70.61 TROCHANTERIC BURSITIS, RIGHT HIP: ICD-10-CM

## 2024-03-13 PROCEDURE — 97110 THERAPEUTIC EXERCISES: CPT | Performed by: PHYSICAL THERAPIST

## 2024-03-13 PROCEDURE — 97161 PT EVAL LOW COMPLEX 20 MIN: CPT | Performed by: PHYSICAL THERAPIST

## 2024-03-13 NOTE — LETTER
2024    Armani Pelletier MD  [address] [place] [address] Road  [address] [place], [place] [address]    Patient: RACHNA Pulido   YOB: 1950   Date of Visit: 3/13/2024     Encounter Diagnosis     ICD-10-CM    1. Pain in right hip  M25.551       2. Trochanteric bursitis, right hip  M70.61       3. Status post hip surgery  Z98.890           Dear Dr. Pelletier:    Thank you for your recent referral of RACHNA Pulido. Please review the attached evaluation summary from A Porsche's recent visit.     Please verify that you agree with the plan of care by signing the attached order.     If you have any questions or concerns, please do not hesitate to call.     I sincerely appreciate the opportunity to share in the care of one of your patients and hope to have another opportunity to work with you in the near future.       Sincerely,    Dorie Delvalle, PT      Referring Provider:      I certify that I have read the below Plan of Care and certify the need for these services furnished under this plan of treatment while under my care.                    Armani Pelletier MD  [address] [place], [place] [id number]  Via Fax: [phone]          PT Evaluation     Today's date: 3/13/2024  Patient name: RACHNA Pulido  : 1950  MRN: [id number]  Referring provider: Armani Pelletier*  Dx:   Encounter Diagnosis     ICD-10-CM    1. Pain in right hip  M25.551       2. Trochanteric bursitis, right hip  M70.61       3. Status post hip surgery  Z98.890                        Assessment  Assessment details: The patient is a 75 y/o female who presents to PT s/p R hip surgery (R hip trochanteric bursectomy with gluteus medius repair and IT band transfer) on 2/15/24 by Dr. Pelletier.  She has complaints of intermittent pulling/pain in her hip.  She demonstrates deficits with decreased ROM and strength, decreased flexibility, antalgic gait with use of brace and AD, decreased balance and proprioception and edema.   These deficits lead to difficulty with stair negotiation, gait dysfunction and pain with completing her ADLs and tasks at home.  She is I with most tasks but her  has been assisting with LE dressing.  She is also using a shower chair when bathing.  She ambulates with use of brace locked 0-90* and RW for household and community distances.  Slow bashir is noted along with decreased weight shift to RLE in stance phase.  She has difficulty with stair negotiation - has been using the walker and going up and down the steps with non-reciprocal gait pattern.  She also has difficulty sleeping, has been sleeping on the recliner or sofa.  She is not currently driving.  Secondary to above deficits she is limited with her overall mobility and function.  The patient would benefit from continued PT to address deficits and improve function.  Tx to include ROM, stretching, strengthening, modalities, HEP, pt education, postural ed, lifting/body mechanics, neuro re-ed, balance/proprioception Te, MT and equipment.         Impairments: abnormal gait, abnormal or restricted ROM, impaired physical strength, lacks appropriate home exercise program, pain with function, weight-bearing intolerance and poor posture   Other impairment: decreased flexibility    Symptom irritability: moderateUnderstanding of Dx/Px/POC: good   Prognosis: good    Goals  STGs:  1.  Initiate and complete HEP with verbal cues.  2.  Improve R hip ROM by 5-10 degrees in 4 weeks.  3.  Improve R LE strength by 1/2 grade in 4 weeks.  4.  Decrease R hip pain by 25% in 4 weeks.  LTGs:  1.  Patient to be I with HEP in 12 weeks.  2.  Improve R Hip ROM to WNL t/o in 12 weeks to improve function.  3.  Improve R LE strength to > or = to 4 to 4+/5 t/o in 12 weeks to improve function.  4.  Decrease R hip pain to < or = to 1-2/10 with activity in 12 weeks to improve function.   5.  Patient to ambulate with normalized gait pattern without AD in 12 weeks.  6.  Stair  negotiation is improved to PLOF in 12 weeks.  7.  Recreational performance is improved to PLOF in 12 weeks.  8.  ADL performance is improved to PLOF in 12 weeks.            Plan  Plan details: Modalities and therapy interventions prn.    Patient would benefit from: skilled physical therapy  Planned modality interventions: cryotherapy and unattended electrical stimulation  Planned therapy interventions: abdominal trunk stabilization, gait training, functional ROM exercises, stretching, strengthening, neuromuscular re-education, manual therapy, home exercise program, therapeutic exercise, postural training, patient education, self care, balance and balance/weight bearing training  Frequency: 2x week  Duration in weeks: 12  Plan of Care beginning date: 3/13/2024  Plan of Care expiration date: 6/5/2024  Treatment plan discussed with: patient        Subjective Evaluation    History of Present Illness  Mechanism of injury: The was previously seen at this clinic for PT treatment from 10/24/23 to 1/18/24 for her R hip.  After she had been discharged she had seen the doctor and had surgery scheduled.  She had then been scheduled for surgery by Dr. Pelletier.  Surgery was 2/15/24, same day surgery at the surgical center in [place].  She had gone back to see the doctor for one follow up appointment, had her staples removed, per patient the doctor is happy with how she is doing.  She will be going back to see the doctor on 3/25 for her follow up appointment.      From EMR review of PT eval from 10/24/23:  Chronc pain in R lateral hip since about one year duration. She has had PT in the past with improvements- most recently in 3/23 at our facility.  In the past, she has tried heel lifts,  AD of cane, salon pas, arnica gel, tylenol, mobic or celebrex, CP, injections in Each Hip  and lumbar spine, lumbar rhizotomies. Most recent injections into R hip were completed 1/23. Most recent rhizotomy  8/15/23 with some relief reported.  Lumbar fusion L4-5 .   Pt was recently to Dr. Pelletier for assessment of R gluteus minimus tear and open repair was recommended she reports.  Pt notes she is seeking another opinion from [facility].   Pt notes regression in actvity tolerances and pain increases since 2023 with out injury or known CADEN, but did travel for family vacation and had increase in pain sx.  She did note increase in sx of pain again with travel in 2023 but no injury or incident occurring.   Was doing walking program at mall 30-35 minutes with [facility] in winter 2023 when she felt her best and now reports inability to tolerate 30-35 minutes secondary to her pain.    Pt medications, PMH and allergies reviewed in chart and with  patient today.     The patient states that she has intermittent pain/soreness in her hip.  Pain with moving certain directions, also feels a pull in her hip.  Pull is noted around her incision.  She notes some swelling in her leg today, she has been using a compression stocking and ice on her foot to help with the swelling.  She denies any N&T into R LE.      Has not slept in her bed yet, has been on her recliner or sofa.   She had talked to her PA about her brace, was told to use it only for her first PT appointment.  She notes that she has not been wearing it consistently at home because at her follow up appointment the doctor told her she could stop using it.    Patient Goals  Patient goals for therapy: decreased pain, increased strength, increased motion, independence with ADLs/IADLs and improved balance  Patient goal: decrease pain medications to manage her pain, to stand to complete cooking, cleaning, ambulation longer and on stairs improved  Pain  At best pain ratin  At worst pain rating: 3  Location: R Hip  Quality: pressure, pulling and discomfort  Relieving factors: ice and rest    Social Support  Steps to enter house: yes  1  Stairs in house: yes   Lives in: multiple-level home  Lives  with: spouse    Employment status: not working (retired)  Hand dominance: right    Treatments  Previous treatment: physical therapy, medication and injection treatment        Objective     Observations     Right Hip  Positive for incision.     Additional Observation Details  Incision present along R posterior/lateral hip.  Healing, no redness or warmth, no active bleeding or drainage noted.      Neurological Testing     Sensation     Hip   Left Hip   Intact: light touch    Right Hip   Intact: light touch    Reflexes   Left   Patellar (L4): normal (2+)  Achilles (S1): trace (1+)    Right   Patellar (L4): normal (2+)  Achilles (S1): trace (1+)    Active Range of Motion   Left Hip   Flexion: 95 degrees   Abduction: 30 degrees     Right Hip   Flexion: 85 degrees with pain  Abduction: 15 degrees with pain    Additional Active Range of Motion Details  R SLR: 20  L SLR: 50    Strength/Myotome Testing     Left Hip   Planes of Motion   Flexion: 4+  Extension: 4  Abduction: 4  Adduction: 4+  External rotation: 4+  Internal rotation: 4+    Right Hip   Planes of Motion   Flexion: 2-  Abduction: 2-  Adduction: 2  External rotation: 2-  Internal rotation: 2-    Left Knee   Flexion: 4+  Extension: 4+    Right Knee   Flexion: 3-  Extension: 3-  Quadriceps contraction: fair    Left Ankle/Foot   Dorsiflexion: 4+  Plantar flexion: 4+  Inversion: 4+  Eversion: 4+  Great toe flexion: 4+  Great toe extension: 4+    Right Ankle/Foot   Dorsiflexion: 4+  Plantar flexion: 4+  Eversion: 4+  Great toe flexion: 4+  Great toe extension: 4+    Ambulation   Weight-Bearing Status   Assistive device used: two-wheeled walker    Additional Weight-Bearing Status Details  Using RW for household and community distances.    Has SPC at home.    Has shower chair, has handles for her toilet.    Using Breg hip brace, locked 0-90*.  At her post-op appointment the patient reports the doctor told her she could stop using it but the PA told her to use it for her  "first PT appointment.      Ambulation: Stairs   Ascend stairs: independent  Pattern: non-reciprocal  Railings: one rail  Descend stairs: independent  Pattern: non-reciprocal  Railings: one rail    Additional Stairs Ambulation Details  Using walker in both hands when going up and down the steps.      Observational Gait   Decreased walking speed and stride length.              Precautions: DOS: 2/15/24 (R hip trochanteric bursectomy with gluteus medius repair and IT band transfer), Lumbar surgery, R THR, HTN      Daily Treatment Diary:      Initial Evaluation Date: 03/13/24  Compliance 3/13                     Visit Number 1                    Re-Eval  IE                 MC   Foto Captured Y                           3/13                     Manual                      R Hip                                                                  Ther-Ex                      NuStep                      Ankle Pumps HEP                     Seated March HEP                     LAQ HEP                     QSets HEP                     Glut Sets HEP                     Heel Slides                      Hip Add w/Ball                                            Reviewed and issued HEP Pt ed on HEP, POC                     Neuro Re-Ed                      Mini squats                        Lunges with TA             Step ups/lateral             Sidestepping                          Ther-Act              STS                                                 Modalities                      CP R hip                                                     Access Code: [email or web address]  URL: [email or web address]  Date: 03/13/2024  Prepared by: Dorie    Exercises  - Long Sitting Quad Set  - 1 x daily - 7 x weekly - 2 sets - 10 reps - 3\" hold  - Supine Gluteal Sets  - 1 x daily - 7 x weekly - 2 sets - 10 reps - 3\" hold  - Seated Gluteal Sets  - 1 x daily - 7 x weekly - 2 sets - 10 reps - 3\" hold  - Seated Long Arc Quad  - 1 x daily - 7 x " "weekly - 2 sets - 10 reps - 3\" hold  - Seated March  - 1 x daily - 7 x weekly - 2 sets - 10 reps  - Supine Ankle Pumps  - 1 x daily - 7 x weekly - 2 sets - 10 reps                    "

## 2024-03-15 ENCOUNTER — OFFICE VISIT (OUTPATIENT)
Dept: PHYSICAL THERAPY | Facility: CLINIC | Age: 74
End: 2024-03-15
Payer: MEDICARE

## 2024-03-15 DIAGNOSIS — M25.551 PAIN IN RIGHT HIP: Primary | ICD-10-CM

## 2024-03-15 DIAGNOSIS — M70.61 TROCHANTERIC BURSITIS, RIGHT HIP: ICD-10-CM

## 2024-03-15 DIAGNOSIS — Z98.890 STATUS POST HIP SURGERY: ICD-10-CM

## 2024-03-15 PROCEDURE — 97110 THERAPEUTIC EXERCISES: CPT

## 2024-03-15 PROCEDURE — 97140 MANUAL THERAPY 1/> REGIONS: CPT

## 2024-03-15 NOTE — PROGRESS NOTES
"Daily Note     Today's date: 3/15/2024  Patient name: RACHNA Pulido  : 1950  MRN: [id number]  Referring provider: No ref. provider found  Dx:   Encounter Diagnosis     ICD-10-CM    1. Pain in right hip  M25.551       2. Trochanteric bursitis, right hip  M70.61       3. Status post hip surgery  Z98.890                      Subjective: Patient reports some \"pulling\" type feeling here and there with certain movements, for example, moving to take her compression stocking off last night on recliner. She notes she gets nervous when she feels any sort of pulling, but has been feeling pretty good otherwise. Notes no major pain within R hip. She also has experienced some R knee stiffness and discomfort.      Objective: See treatment diary below  Currently waiting for OR report and PT protocol to be faxed from surgeon's office. Patient has also reached out via phone and portal for this info.      Assessment: Tolerated treatment well without complaint. Patient with good tolerance to gentle PROM of R hip within precautions outlined below. Patient would benefit from continued PT to increase R hip strength and mobility as able per restrictions and protocol for improved function in ADLs.      Plan: Continue per plan of care.      Precautions: DOS: 2/15/24 (R hip trochanteric bursectomy with gluteus medius repair and IT band transfer), Lumbar surgery, R THR, HTN      Daily Treatment Diary:      Initial Evaluation Date: 24  Compliance 3/13  3/15                   Visit Number 1 2                   Re-Eval  IE                 MC   Foto Captured Y                           3/13  3/15                   Manual                      R Hip  10 min                      (50% WB c RW, no flexion past 90, no add past neutral, no active abd)                                         Ther-Ex                      NuStep                      Ankle Pumps HEP  20x                   Seated March HEP  20x                   LAQ HEP 20x     " "              QSets HEP  5\"x20                   Glut Sets HEP  5\"x20                   Heel Slides  PTA guided 20x                   Hip Add w/Asif                                            Reviewed and issued HEP Pt ed on HEP, POC                     Neuro Re-Ed                      Mini squats                        Lunges with TA             Step ups                                       Ther-Act              STS   10x hi low table c HHA                                              Modalities                      CP R hip                                                     Access Code: [email or web address]  URL: [email or web address]  Date: 03/13/2024  Prepared by: Dorie    Exercises  - Long Sitting Quad Set  - 1 x daily - 7 x weekly - 2 sets - 10 reps - 3\" hold  - Supine Gluteal Sets  - 1 x daily - 7 x weekly - 2 sets - 10 reps - 3\" hold  - Seated Gluteal Sets  - 1 x daily - 7 x weekly - 2 sets - 10 reps - 3\" hold  - Seated Long Arc Quad  - 1 x daily - 7 x weekly - 2 sets - 10 reps - 3\" hold  - Seated March  - 1 x daily - 7 x weekly - 2 sets - 10 reps  - Supine Ankle Pumps  - 1 x daily - 7 x weekly - 2 sets - 10 reps         "

## 2024-03-19 ENCOUNTER — OFFICE VISIT (OUTPATIENT)
Dept: PHYSICAL THERAPY | Facility: CLINIC | Age: 74
End: 2024-03-19
Payer: MEDICARE

## 2024-03-19 DIAGNOSIS — M70.61 TROCHANTERIC BURSITIS, RIGHT HIP: ICD-10-CM

## 2024-03-19 DIAGNOSIS — Z98.890 STATUS POST HIP SURGERY: ICD-10-CM

## 2024-03-19 DIAGNOSIS — M25.551 PAIN IN RIGHT HIP: Primary | ICD-10-CM

## 2024-03-19 PROCEDURE — 97140 MANUAL THERAPY 1/> REGIONS: CPT | Performed by: PHYSICAL THERAPIST

## 2024-03-19 PROCEDURE — 97110 THERAPEUTIC EXERCISES: CPT | Performed by: PHYSICAL THERAPIST

## 2024-03-19 NOTE — PROGRESS NOTES
"Daily Note     Today's date: 3/19/2024  Patient name: RCAHNA Pulido  : 1950  MRN: [id number]  Referring provider: No ref. provider found  Dx:   Encounter Diagnosis     ICD-10-CM    1. Pain in right hip  M25.551       2. Trochanteric bursitis, right hip  M70.61       3. Status post hip surgery  Z98.890                      Subjective:  Pt notes she developed \"pressure  sores\" post op, is using silvadene as directed by Dr. Pelletier and it is improving. My outer hip has some pulling type sx when I move to dress Les but other eaton si not having pain. The pre op pain I had in hip is much improved.       Objective: See treatment diary below> Pt cont to amb with FW walker at all times  50% WB on RLE. She progressed today to hip ext, add submax isometrics, gentle pressure with good tolerance and no pain sx.  Heel slides cont to be A again with no pain sx reported.  Incision is appearing to be healing well lateral hip with no signs of infection present.  Pressure area on R buttock also appear to be healing well with no signs of infection, dry skin about perimeter of sores which are located on R buttock crease  and just distal to it on medial gluteal region.  Pt amb with FW walker with flexed lumbar spine and ability to  improve with verbal cues. Pt completed sit to stands with modified table height with out UE A today and no pain sx. Pt tolerated care without pain sx.  Pt. Progressing well toward goals of care. .  Sh efollows with Dr. Pelletier Monday.       Assessment: Tolerated treatment well. Patient would benefit from continued PT to achieve goals of care.       Plan: Continue per plan of care.      Precautions: DOS: 2/15/24 (R hip trochanteric bursectomy with gluteus medius repair and IT band transfer), Lumbar surgery, R THR, HTN      Daily Treatment Diary:      Initial Evaluation Date: 24  Compliance 3/13  3/15  3/19                 Visit Number 1 2  3                 Re-Eval  IE                 JOSE Cowan " "Captured Y                           3/13  3/15  3/19                 Manual                      R Hip  10 min   15m                   (50% WB c RW, no flexion past 90, no add past neutral, no active abd)                                         Ther-Ex                      NuStep                      Ankle Pumps HEP  20x  d/c to HEP                 Seated March HEP  20x  H/L                 LAQ HEP 20x  SAQ/LAQ 20x                  QSets HEP  5\"x20  5\"x20                 Glut Sets HEP  5\"x20  5\"x20                 Heel Slides  PTA guided 20x                   Hip Add w/Ball     5\" x 15 submax                 Hip ext iso     5\" x 15 submax                 Reviewed and issued HEP Pt ed on HEP, POC                     Neuro Re-Ed                      Mini squats                        Lunges with TA             Step ups                                       Ther-Act              STS   10x hi low table c HHA  10x hi low modifed height no UE A                                            Modalities                      CP R hip                                                     Access Code: [email or web address]  URL: [email or web address]  Date: 03/13/2024  Prepared by: Dorie    Exercises  - Long Sitting Quad Set  - 1 x daily - 7 x weekly - 2 sets - 10 reps - 3\" hold  - Supine Gluteal Sets  - 1 x daily - 7 x weekly - 2 sets - 10 reps - 3\" hold  - Seated Gluteal Sets  - 1 x daily - 7 x weekly - 2 sets - 10 reps - 3\" hold  - Seated Long Arc Quad  - 1 x daily - 7 x weekly - 2 sets - 10 reps - 3\" hold  - Seated March  - 1 x daily - 7 x weekly - 2 sets - 10 reps  - Supine Ankle Pumps  - 1 x daily - 7 x weekly - 2 sets - 10 reps           "

## 2024-03-21 ENCOUNTER — OFFICE VISIT (OUTPATIENT)
Dept: PHYSICAL THERAPY | Facility: CLINIC | Age: 74
End: 2024-03-21
Payer: MEDICARE

## 2024-03-21 DIAGNOSIS — M70.61 TROCHANTERIC BURSITIS, RIGHT HIP: ICD-10-CM

## 2024-03-21 DIAGNOSIS — Z98.890 STATUS POST HIP SURGERY: ICD-10-CM

## 2024-03-21 DIAGNOSIS — M25.551 PAIN IN RIGHT HIP: Primary | ICD-10-CM

## 2024-03-21 PROCEDURE — 97110 THERAPEUTIC EXERCISES: CPT | Performed by: PHYSICAL THERAPIST

## 2024-03-21 PROCEDURE — 97140 MANUAL THERAPY 1/> REGIONS: CPT | Performed by: PHYSICAL THERAPIST

## 2024-03-21 NOTE — PROGRESS NOTES
Daily Note     Today's date: 3/21/2024  Patient name: RACHNA Pulido  : 1950  MRN: [id number]  Referring provider: Armani Pelletier*  Dx:   Encounter Diagnosis     ICD-10-CM    1. Pain in right hip  M25.551       2. Trochanteric bursitis, right hip  M70.61       3. Status post hip surgery  Z98.890                      Subjective: Pt notes good tolerance to last visit with stiffness and soreness in back and knee, mild tightness in R hip this am.  Notes her back extra stiff this am.       Objective: See treatment diary below. Pt completed TE as documented with good tolerance.  Held on sit to stands secondary to back pain today.  Utilized HP to lumbar spine seated post tx  to assist with stiffness with good response reported by pt.  Pt demonstrating improving exercise tolerance L hip. Cont to avoid active hip abduction at this time. Pt cont to amb with FW walker, today with inc in trunk flexion demonstrated secondary to inc stiffness. Denies any hip pain with PWB RLE  with walker.        Assessment: Tolerated treatment well. Patient would benefit from continued PT to achieve goals of care.       Plan: Continue per plan of care.      Precautions: DOS: 2/15/24 (R hip trochanteric bursectomy with gluteus medius repair and IT band transfer), Lumbar surgery, R THR, HTN      Daily Treatment Diary:      Initial Evaluation Date: 24  Compliance 3/13  3/15  3/19  3/21               Visit Number 1 2  3  4               Re-Eval  IE                 MC   Foto Captured Y                           3/13  3/15  3/19  3/21               Manual                      R Hip  10 min   15m  10m                 (50% WB c RW, no flexion past 90, no add past neutral, no active abd)                                         Ther-Ex                      NuStep                      Ankle Pumps HEP  20x  d/c to HEP  -               Seated March HEP  20x  H/L  H/L AA 2 x 10               LAQ HEP 20x  SAQ/LAQ 20x   SAQ/LAQ 20x         "       QSets HEP  5\"x20  5\"x20  20x5\"               Glut Sets HEP  5\"x20  5\"x20  20x35               Heel Slides  PTA guided 20x                  Hip Add w/Ball     5\" x 15 submax  5\"x20 submax               Hip ext iso     5\" x 15 submax  5' x20 subamx               Reviewed and issued HEP Pt ed on HEP, POC                     Neuro Re-Ed                      Mini squats                        Lunges with TA             Step ups                                       Ther-Act              STS   10x hi low table c HHA  10x hi low modifed height no UE A  -                                          Modalities                      CP R hip    HP L/S post tx 10m                                                 Access Code: [email or web address]  URL: [email or web address]  Date: 03/13/2024  Prepared by: Dorie    Exercises  - Long Sitting Quad Set  - 1 x daily - 7 x weekly - 2 sets - 10 reps - 3\" hold  - Supine Gluteal Sets  - 1 x daily - 7 x weekly - 2 sets - 10 reps - 3\" hold  - Seated Gluteal Sets  - 1 x daily - 7 x weekly - 2 sets - 10 reps - 3\" hold  - Seated Long Arc Quad  - 1 x daily - 7 x weekly - 2 sets - 10 reps - 3\" hold  - Seated March  - 1 x daily - 7 x weekly - 2 sets - 10 reps  - Supine Ankle Pumps  - 1 x daily - 7 x weekly - 2 sets - 10 reps             "

## 2024-03-26 ENCOUNTER — OFFICE VISIT (OUTPATIENT)
Dept: PHYSICAL THERAPY | Facility: CLINIC | Age: 74
End: 2024-03-26
Payer: MEDICARE

## 2024-03-26 DIAGNOSIS — Z98.890 STATUS POST HIP SURGERY: ICD-10-CM

## 2024-03-26 DIAGNOSIS — M70.61 TROCHANTERIC BURSITIS, RIGHT HIP: ICD-10-CM

## 2024-03-26 DIAGNOSIS — M25.551 PAIN IN RIGHT HIP: Primary | ICD-10-CM

## 2024-03-26 PROCEDURE — 97140 MANUAL THERAPY 1/> REGIONS: CPT | Performed by: PHYSICAL THERAPIST

## 2024-03-26 PROCEDURE — 97110 THERAPEUTIC EXERCISES: CPT | Performed by: PHYSICAL THERAPIST

## 2024-03-26 NOTE — PROGRESS NOTES
Daily Note     Today's date: 3/26/2024  Patient name: RACHNA Pulido  : 1950  MRN: [id number]  Referring provider: No ref. provider found  Dx:   Encounter Diagnosis     ICD-10-CM    1. Pain in right hip  M25.551       2. Trochanteric bursitis, right hip  M70.61       3. Status post hip surgery  Z98.890                      Subjective: Pt  notes she had follow with Dr. Spence and was continued to PT tx with progression to WBAT  at 6 weeks. She will follow with him again in 6 weeks.  Pt notes no pain sx this am pre tx.  Pt notes she is able to stand for self care and cooking without pain in R hip/buttock that was familiar to pre op status and was limiting to her.       Objective: See treatment diary below> pt amb. With 25-50% WB using FW walker length of bldg 2 laps with fatigue at end of session through R postlat hip, no pain sx. Protocol brought by pt with progression to submax hip abd iso, AROM progression  and progression with WBAT.  Began STM to scar with good tolerance today. Soft tissue mobility decreased by 50% of full       Assessment: Tolerated treatment well. Patient would benefit from continued PT      Plan: Continue per plan of care.      Precautions: DOS: 2/15/24 (R hip trochanteric bursectomy with gluteus medius repair and IT band transfer), Lumbar surgery, R THR, HTN      Daily Treatment Diary:      Initial Evaluation Date: 24  Compliance 3/13  3/15  3/19  3/21  3/26             Visit Number 1 2  3  4  5             Re-Eval  IE                 MC   Foto Captured Y         do foto                  3/13  3/15  3/19  3/21  3/26             Manual                      R Hip  10 min   15m  10m  10 m               (50% WB c RW, no flexion past 90, no add past neutral, no active abd)                                         Ther-Ex                      NuStep                      Ankle Pumps HEP  20x  d/c to HEP  -               Seated March HEP  20x  H/L  H/L AA 2 x 10  H/L AA 20x             "  LAQ HEP 20x  SAQ/LAQ 20x   SAQ/LAQ 20x  SAQ 1# 20x3\", LAQ 1#  20x             QSets HEP  5\"x20  5\"x20  20x5\"  20x5\"             Glut Sets HEP  5\"x20  5\"x20  20x5  20x5\"             Heel Slides  PTA guided 20x     PT guided             Hip Add w/Ball     5\" x 15 submax  5\"x20 submax  20x5\" submax             Hip ext iso     5\" x 15 submax  5' x20 subamx  5'x 20             Reviewed and issued HEP Pt ed on HEP, POC                     Neuro Re-Ed                      Mini squats                        Lunges with TA             Step ups                                       Ther-Act              STS   10x hi low table c HHA  10x hi low modifed height no UE A  -  10x high low modified height no UE A             Amb/gait tr     100ft. X 2 50% WB walker use cues upright posture                      Modalities                      CP R hip    HP L/S post tx 10m HP L/S, R glut post tx                                                Access Code: [email or web address]  URL: [email or web address]  Date: 03/13/2024  Prepared by: Dorie    Exercises  - Long Sitting Quad Set  - 1 x daily - 7 x weekly - 2 sets - 10 reps - 3\" hold  - Supine Gluteal Sets  - 1 x daily - 7 x weekly - 2 sets - 10 reps - 3\" hold  - Seated Gluteal Sets  - 1 x daily - 7 x weekly - 2 sets - 10 reps - 3\" hold  - Seated Long Arc Quad  - 1 x daily - 7 x weekly - 2 sets - 10 reps - 3\" hold  - Seated March  - 1 x daily - 7 x weekly - 2 sets - 10 reps  - Supine Ankle Pumps  - 1 x daily - 7 x weekly - 2 sets - 10 reps               "

## 2024-03-29 ENCOUNTER — OFFICE VISIT (OUTPATIENT)
Dept: PHYSICAL THERAPY | Facility: CLINIC | Age: 74
End: 2024-03-29
Payer: MEDICARE

## 2024-03-29 DIAGNOSIS — Z98.890 STATUS POST HIP SURGERY: ICD-10-CM

## 2024-03-29 DIAGNOSIS — M25.551 PAIN IN RIGHT HIP: Primary | ICD-10-CM

## 2024-03-29 DIAGNOSIS — M70.61 TROCHANTERIC BURSITIS, RIGHT HIP: ICD-10-CM

## 2024-03-29 PROCEDURE — 97530 THERAPEUTIC ACTIVITIES: CPT | Performed by: PHYSICAL THERAPIST

## 2024-03-29 PROCEDURE — 97110 THERAPEUTIC EXERCISES: CPT | Performed by: PHYSICAL THERAPIST

## 2024-03-29 NOTE — PROGRESS NOTES
Daily Note     Today's date: 3/29/2024  Patient name: RACHNA Pulido  : 1950  MRN: [id number]  Referring provider: No ref. provider found  Dx:   Encounter Diagnosis     ICD-10-CM    1. Pain in right hip  M25.551       2. Trochanteric bursitis, right hip  M70.61       3. Status post hip surgery  Z98.890                      Subjective: Pt notes travel and increase in walking yesterday with good tolerance. Notes no pain pre tx but states does feel discomfort in R L/S with prolonged standing. Notes right hip feels weakn but not painful.      Objective: See treatment diary below. Pre tx  pain R hip 0/10, Fatigue in hip noted post tx.  Pt progressed to passive abd R hip, AA hip abd on slide baord supine, and submax hip abd isometrics today. Progressed to upright posture amb in parallel bars with note of R L/S discomfort with upright posturing attained while using parallel bars. Noted fatigue in R hip mm with amb but no pain sx.           Assessment: Tolerated treatment well. Patient demonstrated fatigue post treatment and would benefit from continued PT to achieve goals of care.       Plan: Continue per plan of care.      Precautions: DOS: 2/15/24 (R hip trochanteric bursectomy with gluteus medius repair and IT band transfer), Lumbar surgery, R THR, HTN      Daily Treatment Diary:      Initial Evaluation Date: 24  Compliance 3/13  3/15  3/19  3/21  3/26  3/29           Visit Number 1 2  3  4  5  6           Re-Eval  IE                 MC   Foto Captured Y          do foto                3/13  3/15  3/19  3/21  3/26  3/29           Manual                      R Hip  10 min   15m  10m  10 m  10m             (50% WB c RW, no flexion past 90, no add past neutral, no active abd)                   STM R scar, hip / lumbar soft tissue                      Ther-Ex                      NuStep             begin upright bike         Ankle Pumps HEP  20x  d/c to HEP  -    hip abd sumbax iso H/L and neutral hip 2 x 10,  "3\"          Seated March HEP  20x  H/L  H/L AA 2 x 10  H/L AA 20x   H/L AA 20x          LAQ HEP 20x  SAQ/LAQ 20x   SAQ/LAQ 20x  SAQ 1# 20x3\", LAQ 1#  20x  SAQ   2 # 20x/3\"/LAQ 2# 20x3\"          QSets HEP  5\"x20  5\"x20  20x5\"  20x5\"  20x5\"           Glut Sets HEP  5\"x20  5\"x20  20x5  20x5\"  20x5\"           Heel Slides  PTA guided 20x     PT guided A on slide board 20x           Hip Add w/Ball     5\" x 15 submax  5\"x20 submax  20x5\" submax  20x5\"           Hip ext iso     5\" x 15 submax  5' x20 subamx  5'x 20  5\"x20           Reviewed and issued HEP Pt ed on HEP, POC                     Neuro Re-Ed                      Mini squats             15x           Lunges with TA             Step ups                                       Ther-Act              STS   10x hi low table c HHA  10x hi low modifed height no UE A  -  10x high low modified height no UE A  10x high low modified height no UE A           Amb/gait tr     100ft. X 2 50% WB walker use cues upright posture //bars PWB RLE 5 laps cues upright WBAT                     Modalities                      CP R hip    HP L/S post tx 10m HP L/S, R glut post tx CP R hip L/S post tx 10 m                                               Access Code: [email or web address]  URL: [email or web address]  Date: 03/13/2024  Prepared by: Dorie    Exercises  - Long Sitting Quad Set  - 1 x daily - 7 x weekly - 2 sets - 10 reps - 3\" hold  - Supine Gluteal Sets  - 1 x daily - 7 x weekly - 2 sets - 10 reps - 3\" hold  - Seated Gluteal Sets  - 1 x daily - 7 x weekly - 2 sets - 10 reps - 3\" hold  - Seated Long Arc Quad  - 1 x daily - 7 x weekly - 2 sets - 10 reps - 3\" hold  - Seated March  - 1 x daily - 7 x weekly - 2 sets - 10 reps  - Supine Ankle Pumps  - 1 x daily - 7 x weekly - 2 sets - 10 reps                 "

## 2024-04-02 ENCOUNTER — OFFICE VISIT (OUTPATIENT)
Dept: PHYSICAL THERAPY | Facility: CLINIC | Age: 74
End: 2024-04-02
Payer: MEDICARE

## 2024-04-02 DIAGNOSIS — M25.551 PAIN IN RIGHT HIP: Primary | ICD-10-CM

## 2024-04-02 DIAGNOSIS — Z98.890 STATUS POST HIP SURGERY: ICD-10-CM

## 2024-04-02 DIAGNOSIS — M70.61 TROCHANTERIC BURSITIS, RIGHT HIP: ICD-10-CM

## 2024-04-02 PROCEDURE — 97110 THERAPEUTIC EXERCISES: CPT | Performed by: PHYSICAL THERAPIST

## 2024-04-02 PROCEDURE — 97140 MANUAL THERAPY 1/> REGIONS: CPT | Performed by: PHYSICAL THERAPIST

## 2024-04-02 PROCEDURE — 97530 THERAPEUTIC ACTIVITIES: CPT | Performed by: PHYSICAL THERAPIST

## 2024-04-02 NOTE — PROGRESS NOTES
Daily Note     Today's date: 2024  Patient name: RACHNA Pulido  : 1950  MRN: [id number]  Referring provider: Lexi Green MD  Dx:   Encounter Diagnosis     ICD-10-CM    1. Pain in right hip  M25.551       2. Trochanteric bursitis, right hip  M70.61       3. Status post hip surgery  Z98.890                      Subjective: Pt notes she is walking with less assist of walker, sometimes cruising on countertops  and has no hip pain and tolerates well. She progressed to amb on stairs one at a time without  walker. She  does admit to  daily R lower back pain especially in am until  takes am medication, with lying flat and standing upright.She notes pre op hip pain is resolved and she is looking forward to weaning to a cane.       Objective: See treatment diary below. Cueing required for ex completion and posture with standing and walking with walker and in parallel bars.  Cueing required for increase in stride length with amb in walker to avoid small steps.       Assessment: Tolerated treatment well but some limit secondary to R LBP with posturing supine and upright in standing.  Educated in core stabilization ex progression. Patient would benefit from continued PT to progress toward outlined goals of care.       Plan: Continue per plan of care.      Precautions: DOS: 2/15/24 (R hip trochanteric bursectomy with gluteus medius repair and IT band transfer), Lumbar surgery, R THR, HTN      Daily Treatment Diary:      Initial Evaluation Date: 24  Compliance 3/13  3/15  3/19  3/21  3/26  3/29  4         Visit Number 1 2  3  4  5  6  7         Re-Eval  IE                 MC   Foto Captured Y            do foto              3/13  3/15  3/19  3/21  3/26  3/29  4/2         Manual                      R Hip  10 min   15m  10m  10 m  10m  10m           (50% WB c RW, no flexion past 90, no add past neutral, no active abd)                   STM R scar, hip / lumbar soft tissue             10m         Ther-Ex   "                    NuStep             begin upright bike-t!  begin upright bike       Ankle Pumps HEP  20x  d/c to HEP  -    hip abd sumbax iso H/L and neutral hip 2 x 10, 3\" Hip abd submax iso H/L and neutral hip 20x 3\"         Seated March HEP  20x  H/L  H/L AA 2 x 10  H/L AA 20x   H/L AA 20x H/L 15 x          LAQ HEP 20x  SAQ/LAQ 20x   SAQ/LAQ 20x  SAQ 1# 20x3\", LAQ 1#  20x  SAQ   2 # 20x/3\"/LAQ 2# 20x3\" SAQ  3# 20x3\"//LAQ 3# 20x3\"         QSets HEP  5\"x20  5\"x20  20x5\"  20x5\"  20x5\"  10x10\"  ASLR with TA       Glut Sets HEP  5\"x20  5\"x20  20x5  20x5\"  20x5\"  d/c to HEP         Heel Slides  PTA guided 20x     PT guided A on slide board 20x  A slide board 20x         Hip Add w/Ball     5\" x 15 submax  5\"x20 submax  20x5\" submax  20x5\"  20x5\"         Hip ext iso     5\" x 15 submax  5' x20 subamx  5'x 20  5\"x20  5\" x 20         Reviewed and issued HEP Pt ed on HEP, POC                     Neuro Re-Ed                      Mini squats             15x  -         Lunges with TA             Step ups                    Weight shifting  as cee onto RLE in // 3m  step taps 4\" 2m HR A                   Ther-Act              STS   10x hi low table c HHA  10x hi low modifed height no UE A  -  10x high low modified height no UE A  10x high low modified height no UE A  -         Amb/gait tr     100ft. X 2 50% WB walker use cues upright posture //bars PWB RLE 5 laps cues upright WBAT //bars cues posture, stride length WBAT RLE 6 laps             Educ in core stab- TA with BKFOs, PPT, LTR short arc, seated overhead reach with TA       Modalities                      CP R hip    HP L/S post tx 10m HP L/S, R glut post tx CP R hip L/S post tx 10 m --                                              Access Code: [email or web address]  URL: [email or web address]  Date: 03/13/2024  Prepared by: Dorie    Exercises  - Long Sitting Quad Set  - 1 x daily - 7 x weekly - 2 sets - 10 reps - 3\" hold  - Supine Gluteal Sets  - 1 x daily - 7 x " "weekly - 2 sets - 10 reps - 3\" hold  - Seated Gluteal Sets  - 1 x daily - 7 x weekly - 2 sets - 10 reps - 3\" hold  - Seated Long Arc Quad  - 1 x daily - 7 x weekly - 2 sets - 10 reps - 3\" hold  - Seated March  - 1 x daily - 7 x weekly - 2 sets - 10 reps  - Supine Ankle Pumps  - 1 x daily - 7 x weekly - 2 sets - 10 reps                   "

## 2024-04-04 ENCOUNTER — OFFICE VISIT (OUTPATIENT)
Dept: PHYSICAL THERAPY | Facility: CLINIC | Age: 74
End: 2024-04-04
Payer: MEDICARE

## 2024-04-04 DIAGNOSIS — M25.551 PAIN IN RIGHT HIP: Primary | ICD-10-CM

## 2024-04-04 DIAGNOSIS — Z98.890 STATUS POST HIP SURGERY: ICD-10-CM

## 2024-04-04 DIAGNOSIS — M70.61 TROCHANTERIC BURSITIS, RIGHT HIP: ICD-10-CM

## 2024-04-04 PROCEDURE — 97530 THERAPEUTIC ACTIVITIES: CPT | Performed by: PHYSICAL THERAPIST

## 2024-04-04 PROCEDURE — 97110 THERAPEUTIC EXERCISES: CPT | Performed by: PHYSICAL THERAPIST

## 2024-04-04 NOTE — PROGRESS NOTES
Daily Note     Today's date: 2024  Patient name: RACHNA Pulido  : 1950  MRN: [id number]  Referring provider: Lexi Green MD  Dx:   Encounter Diagnosis     ICD-10-CM    1. Pain in right hip  M25.551       2. Trochanteric bursitis, right hip  M70.61       3. Status post hip surgery  Z98.890                      Subjective: Pt reports good tolerance to last tx. She reports self progression to SLC for the past 2 days at home with good tolerance. She notes she feels better walking with the cane and can stand upright better with it compared to walker use. Pt notes mild R knee discomfort but no back pain today.       Objective: See treatment diary below. Pt progressed to upright bike x 1.3 mi, L1 resistance with mild right knee discomfort at end of ride, alleviated with stopping pedal. Denied any R hip or LBP with task. Progressed to amb with SLC 100ft demonstrating non-antalgic gait with upright posture.  Pt progressed with TE of fwd and backward weight shifting WBAT onto RLE, active hip abd and SLR in standing with good tolerance. Pt noted no pain sx post tx but mild fatigue in lower R lumbar spine. She did require two 2 minute rests seated secondary to fatigue in lower back with WB tx activity. Reviewed self managements, gradual progression to WB with SLC and signs and sx to use walker, and use of upright bike at home as tolerated with gradual increases avoiding pain sx.       Assessment: Tolerated treatment well. Patient demonstrated fatigue post treatment and will benefit from continued skilled care to achieve goals.       Plan: Continue per plan of care.      Precautions: DOS: 2/15/24 (R hip trochanteric bursectomy with gluteus medius repair and IT band transfer), Lumbar surgery, R THR, HTN      Daily Treatment Diary:      Initial Evaluation Date: 24  Compliance 3/13  3/15  3/19  3/21  3/26  3/29  4/2  4/4       Visit Number 1 2  3  4  5  6  7  8       Re-Eval  CHELSY Woods  "Captured Y           Y              3/13  3/15  3/19  3/21  3/26  3/29  4/2  4/4       Manual                      R Hip  10 min   15m  10m  10 m  10m  10m  -         (50% WB c RW, no flexion past 90, no add past neutral, no active abd)                   STM R scar, hip / lumbar soft tissue             10m  -       Ther-Ex                      NuStep             begin upright bike-t! Upright bike seat 7 1.3 mi, L1       Ankle Pumps HEP  20x  d/c to HEP  -    hip abd sumbax iso H/L and neutral hip 2 x 10, 3\" Hip abd submax iso H/L and neutral hip 20x 3\"  --       Seated March HEP  20x  H/L  H/L AA 2 x 10  H/L AA 20x   H/L AA 20x H/L 15 x   --       LAQ HEP 20x  SAQ/LAQ 20x   SAQ/LAQ 20x  SAQ 1# 20x3\", LAQ 1#  20x  SAQ   2 # 20x/3\"/LAQ 2# 20x3\" SAQ  3# 20x3\"//LAQ 3# 20x3\"  --       QSets HEP  5\"x20  5\"x20  20x5\"  20x5\"  20x5\"  10x10\" St hip 2 ways abd, SLR 20x       Glut Sets HEP  5\"x20  5\"x20  20x5  20x5\"  20x5\"  d/c to HEP         Heel Slides  PTA guided 20x     PT guided A on slide board 20x  A slide board 20x  -       Hip Add w/Ball     5\" x 15 submax  5\"x20 submax  20x5\" submax  20x5\"  20x5\"  -       Hip ext iso     5\" x 15 submax  5' x20 subamx  5'x 20  5\"x20  5\" x 20         Reviewed and issued HEP Pt ed on HEP, POC                     Neuro Re-Ed                      Mini squats             15x  -  15x       Lunges with TA             Step ups                    Weight shifting  as cee onto RLE in // 3m  step taps 4\" 2m HR A W/S F/B WBAT 2m             Amb with SLC 100ft. X 2     Ther-Act              STS   10x hi low table c HHA  10x hi low modifed height no UE A  -  10x high low modified height no UE A  10x high low modified height no UE A  -         Amb/gait tr     100ft. X 2 50% WB walker use cues upright posture //bars PWB RLE 5 laps cues upright WBAT //bars cues posture, stride length WBAT RLE 6 laps             Educ in core stab- TA with BKFOs, PPT, LTR short arc, seated overhead reach with TA       " "Modalities                      CP R hip    HP L/S post tx 10m HP L/S, R glut post tx CP R hip L/S post tx 10 m -- --                                             Access Code: [email or web address]  URL: [email or web address]  Date: 03/13/2024  Prepared by: Dorie    Exercises  - Long Sitting Quad Set  - 1 x daily - 7 x weekly - 2 sets - 10 reps - 3\" hold  - Supine Gluteal Sets  - 1 x daily - 7 x weekly - 2 sets - 10 reps - 3\" hold  - Seated Gluteal Sets  - 1 x daily - 7 x weekly - 2 sets - 10 reps - 3\" hold  - Seated Long Arc Quad  - 1 x daily - 7 x weekly - 2 sets - 10 reps - 3\" hold  - Seated March  - 1 x daily - 7 x weekly - 2 sets - 10 reps  - Supine Ankle Pumps  - 1 x daily - 7 x weekly - 2 sets - 10 reps                     "

## 2024-04-10 ENCOUNTER — OFFICE VISIT (OUTPATIENT)
Dept: PHYSICAL THERAPY | Facility: CLINIC | Age: 74
End: 2024-04-10
Payer: MEDICARE

## 2024-04-10 DIAGNOSIS — Z98.890 STATUS POST HIP SURGERY: ICD-10-CM

## 2024-04-10 DIAGNOSIS — M70.61 TROCHANTERIC BURSITIS, RIGHT HIP: ICD-10-CM

## 2024-04-10 DIAGNOSIS — M25.551 PAIN IN RIGHT HIP: Primary | ICD-10-CM

## 2024-04-10 PROCEDURE — 97110 THERAPEUTIC EXERCISES: CPT | Performed by: PHYSICAL THERAPIST

## 2024-04-10 PROCEDURE — 97140 MANUAL THERAPY 1/> REGIONS: CPT | Performed by: PHYSICAL THERAPIST

## 2024-04-10 NOTE — PROGRESS NOTES
Daily Note     Today's date: 4/10/2024  Patient name: RACHNA Pulido  : 1950  MRN: [id number]  Referring provider: Lexi Green MD  Dx:   Encounter Diagnosis     ICD-10-CM    1. Pain in right hip  M25.551       2. Trochanteric bursitis, right hip  M70.61       3. Status post hip surgery  Z98.890                      Subjective: Pt notes pain aggravation in R LB and R Knee with initiation of WB in am OOB rated 6/10. She notes R Hip mild soreness,   but not pain,  with initiation of WB or with lying onto R side. She notes inability to sleep on R side yet.  She notes LBP and R knee pain caused her to be more sedentary since her last visit last week.     Objective: See treatment diary below. Pt amb to care today with SLC with trunk flexed, decreased WB on RLE with stance. She notes tenderness about R lumbar PVM and R knee along the inferior anterior aspect of R knee. Pt care plan cont with STM to R L/S PVM and R hip scar, gluteals along with gentle distraction of R knee to assist with pain managements. Discussed use of HP/CP prn along with pt TENS unit she already has at home to assist with her chronic pain. Did not lie supine for tx today and pt noted this did help with her back pain sx.     Assessment: Tolerated treatment well. She noted improved relief of LBP following her tx with standing and was able to demonstrate improved upright posture with amb following her care. Patient would benefit from continued PT to achieve goals of care.       Plan: Continue per plan of care.      Precautions: DOS: 2/15/24 (R hip trochanteric bursectomy with gluteus medius repair and IT band transfer), Lumbar surgery, R THR, HTN      Daily Treatment Diary:      Initial Evaluation Date: 24  Compliance 3/13  3/15  3/19  3/21  3/26  3/29  4/2  4/4  4/10     Visit Number 1 2  3  4  5  6  7  8  9     Re-Eval  IE                 MC   Foto Captured Y           Y              3/13  3/15  3/19  3/21  3/26  3/29  4/   "4/10     Manual                      R Hip  10 min   15m  10m  10 m  10m  10m  -  10m       (50% WB c RW, no flexion past 90, no add past neutral, no active abd)                   STM R scar, hip / lumbar soft tissue             10m  -  10m//R knee distraction and long leg traction 5m     Ther-Ex                      NuStep             begin upright bike-t! Upright bike seat 7 1.3 mi, L1  pt declined sec to R knee pain sx     Ankle Pumps HEP  20x  d/c to HEP  -    hip abd sumbax iso H/L and neutral hip 2 x 10, 3\" Hip abd submax iso H/L and neutral hip 20x 3\"  --  hip abd iso H/L, neutral hip add 20x5\"     Seated March HEP  20x  H/L  H/L AA 2 x 10  H/L AA 20x   H/L AA 20x H/L 15 x   -- H/L 15x//AASLRS H/L 2 x 10 RLE     LAQ HEP 20x  SAQ/LAQ 20x   SAQ/LAQ 20x  SAQ 1# 20x3\", LAQ 1#  20x  SAQ   2 # 20x/3\"/LAQ 2# 20x3\" SAQ  3# 20x3\"//LAQ 3# 20x3\"  --  SAQ A 20x 3\"//LAQ 10x3\"     QSets HEP  5\"x20  5\"x20  20x5\"  20x5\"  20x5\"  10x10\" St hip 2 ways abd, SLR 20x  --     Glut Sets HEP  5\"x20  5\"x20  20x5  20x5\"  20x5\"  d/c to HEP         Heel Slides  PTA guided 20x     PT guided A on slide board 20x  A slide board 20x  -  AA  2 x 10     Hip Add w/Ball     5\" x 15 submax  5\"x20 submax  20x5\" submax  20x5\"  20x5\"  -  20x5\"     Hip ext iso     5\" x 15 submax  5' x20 subamx  5'x 20  5\"x20  5\" x 20    20x5\"     Reviewed and issued HEP Pt ed on HEP, POC                     Neuro Re-Ed                      Mini squats             15x  -  15x  --     Lunges with TA             Step ups                    Weight shifting  as cee onto RLE in // 3m  step taps 4\" 2m HR A W/S F/B WBAT 2m --            Amb with SLC 100ft. X 2 -    Ther-Act              STS   10x hi low table c HHA  10x hi low modifed height no UE A  -  10x high low modified height no UE A  10x high low modified height no UE A  -    -     Amb/gait tr     100ft. X 2 50% WB walker use cues upright posture //bars PWB RLE 5 laps cues upright WBAT //bars cues posture, stride " "length WBAT RLE 6 laps  --           Educ in core stab- TA with BKFOs, PPT, LTR short arc, seated overhead reach with TA  Pt educ use of AD walker or cane prn to assist with sx mgmt/self mgmts HP, CP, TENs prn     Modalities                      CP R hip    HP L/S post tx 10m HP L/S, R glut post tx CP R hip L/S post tx 10 m -- -- --                                            Access Code: [email or web address]  URL: [email or web address]  Date: 03/13/2024  Prepared by: Dorie    Exercises  - Long Sitting Quad Set  - 1 x daily - 7 x weekly - 2 sets - 10 reps - 3\" hold  - Supine Gluteal Sets  - 1 x daily - 7 x weekly - 2 sets - 10 reps - 3\" hold  - Seated Gluteal Sets  - 1 x daily - 7 x weekly - 2 sets - 10 reps - 3\" hold  - Seated Long Arc Quad  - 1 x daily - 7 x weekly - 2 sets - 10 reps - 3\" hold  - Seated March  - 1 x daily - 7 x weekly - 2 sets - 10 reps  - Supine Ankle Pumps  - 1 x daily - 7 x weekly - 2 sets - 10 reps                       "

## 2024-04-11 ENCOUNTER — EVALUATION (OUTPATIENT)
Dept: PHYSICAL THERAPY | Facility: CLINIC | Age: 74
End: 2024-04-11
Payer: MEDICARE

## 2024-04-11 DIAGNOSIS — M70.61 TROCHANTERIC BURSITIS, RIGHT HIP: ICD-10-CM

## 2024-04-11 DIAGNOSIS — Z98.890 STATUS POST HIP SURGERY: ICD-10-CM

## 2024-04-11 DIAGNOSIS — M25.551 PAIN IN RIGHT HIP: Primary | ICD-10-CM

## 2024-04-11 PROCEDURE — 97110 THERAPEUTIC EXERCISES: CPT | Performed by: PHYSICAL THERAPIST

## 2024-04-11 PROCEDURE — 97140 MANUAL THERAPY 1/> REGIONS: CPT | Performed by: PHYSICAL THERAPIST

## 2024-04-11 NOTE — LETTER
2024      No Recipients    Patient: RACHNA Pulido   YOB: 1950   Date of Visit: 2024     Encounter Diagnosis     ICD-10-CM    1. Pain in right hip  M25.551       2. Trochanteric bursitis, right hip  M70.61       3. Status post hip surgery  Z98.890           Dear Dr. Pelletier:    Thank you for your recent referral of RACHNA Pulido. Please review the attached evaluation summary from A Porsche's recent visit.     Please verify that you agree with the plan of care by signing the attached order.     If you have any questions or concerns, please do not hesitate to call.     I sincerely appreciate the opportunity to share in the care of one of your patients and hope to have another opportunity to work with you in the near future.       Sincerely,    Charito Nunn, PT      Referring Provider:      I certify that I have read the below Plan of Care and certify the need for these services furnished under this plan of treatment while under my care.                    Armani Pelletier MD  [address] [place] [address]  Via Fax: [phone]          PT Re-Evaluation     Today's date: 2024  Patient name: RACHNA Pulido  : 1950  MRN: [id number]  Referring provider: Lexi Green MD  Dx:   Encounter Diagnosis     ICD-10-CM    1. Pain in right hip  M25.551       2. Trochanteric bursitis, right hip  M70.61       3. Status post hip surgery  Z98.890                          Assessment  Assessment details: The patient is a 75 y/o female who presents to PT s/p R hip surgery (R hip trochanteric bursectomy with gluteus medius repair and IT band transfer) on 2/15/24 by Dr. Pelletier.  She has complaints of intermittent pulling/pain in her hip.  She demonstrates deficits with decreased ROM and strength, decreased flexibility, antalgic gait with use of brace and AD, decreased balance and proprioception and edema.  These deficits lead to difficulty with stair negotiation, gait  dysfunction and pain with completing her ADLs and tasks at home.  She is I with most tasks but her  has been assisting with LE dressing.  She is also using a shower chair when bathing.  She ambulates with use of brace locked 0-90* and RW for household and community distances.  Slow bashir is noted along with decreased weight shift to RLE in stance phase.  She has difficulty with stair negotiation - has been using the walker and going up and down the steps with non-reciprocal gait pattern.  She also has difficulty sleeping, has been sleeping on the recliner or sofa.  She is not currently driving.  Secondary to above deficits she is limited with her overall mobility and function.  The patient would benefit from continued PT to address deficits and improve function.  Tx to include ROM, stretching, strengthening, modalities, HEP, pt education, postural ed, lifting/body mechanics, neuro re-ed, balance/proprioception Te, MT and equipment.      Updtae 4/11/24- Pt has been treated  10  times in Physical Therapy for care of R hip s/p gluteus minimus reapir and ITB tendon transfer compelted 2/15/24. Pt notes increase in functional WB tolerances and sleep in bed. Pt has progressed with WBAT RLE to cane use but is finding increase in chronic R Knee pain and R LBP with activity increase. She was educated in use of walker to assist with LBP and R Knee pain prn. Pt nnotes good tolerance to WB of R hip with out pain but does note intermittent soreness with function and lying onto R side for sleep.  She presents with tenderness to palpation of scar, R gluteal soft tissue, R  thoraco/lumbar  PVMs. She has mild swelling generalized in R knee with ROM 5-125 degrees. R hip ROM  and strength has progressed  as anticipated following surgery. Pt has progressed with WB AT RLE. She does require UE A for SLB On RLE currently and denies any hip pain sx. With task. Pt amb   with SLC  100ft.  Distance with non antalgic gait with decrease  trunk rotation and RUE arm swing.  Pt will benefit from continued skilled care to achieve PT goals of care.            Impairments: abnormal gait, abnormal or restricted ROM, impaired physical strength, lacks appropriate home exercise program, pain with function, weight-bearing intolerance and poor posture   Other impairment: decreased flexibility    Symptom irritability: moderateUnderstanding of Dx/Px/POC: good   Prognosis: good    Goals  STGs:  1.  Initiate and complete HEP with verbal cues. progressing  2.  Improve R hip ROM by 5-10 degrees in 4 weeks. achieved  3.  Improve R LE strength by 1/2 grade in 4 weeks. achieved  4.  Decrease R hip pain by 25% in 4 weeks achieved.  LTGs: progressing  1.  Patient to be I with HEP in 12 weeks.  2.  Improve R Hip ROM to WNL t/o in 12 weeks to improve function.  3.  Improve R LE strength to > or = to 4 to 4+/5 t/o in 12 weeks to improve function.  4.  Decrease R hip pain to < or = to 1-2/10 with activity in 12 weeks to improve function.   5.  Patient to ambulate with normalized gait pattern without AD in 12 weeks.  6.  Stair negotiation is improved to PLOF in 12 weeks.  7.  Recreational performance is improved to PLOF in 12 weeks.  8.  ADL performance is improved to PLOF in 12 weeks.            Plan  Plan details:    Patient would benefit from: skilled physical therapy  Planned modality interventions: cryotherapy and unattended electrical stimulation  Planned therapy interventions: abdominal trunk stabilization, gait training, functional ROM exercises, stretching, strengthening, neuromuscular re-education, manual therapy, home exercise program, therapeutic exercise, postural training, patient education, self care, balance and balance/weight bearing training  Frequency: 2x week  Duration in weeks: 5  Plan of Care beginning date: 4/11/2024  Plan of Care expiration date: 5/16/2024  Treatment plan discussed with: patient        Subjective Evaluation    History of Present  Illness  Mechanism of injury: The was previously seen at this clinic for PT treatment from 10/24/23 to 1/18/24 for her R hip.  After she had been discharged she had seen the doctor and had surgery scheduled.  She had then been scheduled for surgery by Dr. Pelletier.  Surgery was 2/15/24, same day surgery at the surgical center in [place].  She had gone back to see the doctor for one follow up appointment, had her staples removed, per patient the doctor is happy with how she is doing.  She will be going back to see the doctor on 3/25 for her follow up appointment.      From EMR review of PT eval from 10/24/23:  Chronc pain in R lateral hip since about one year duration. She has had PT in the past with improvements- most recently in 3/23 at our facility.  In the past, she has tried heel lifts,  AD of cane, salon pas, arnica gel, tylenol, mobic or celebrex, CP, injections in Each Hip  and lumbar spine, lumbar rhizotomies. Most recent injections into R hip were completed 1/23. Most recent rhizotomy  8/15/23 with some relief reported. Lumbar fusion L4-5 2022.   Pt was recently to Dr. Pelletier for assessment of R gluteus minimus tear and open repair was recommended she reports.  Pt notes she is seeking another opinion from [facility].   Pt notes regression in actvity tolerances and pain increases since June 2023 with out injury or known CADEN, but did travel for family vacation and had increase in pain sx.  She did note increase in sx of pain again with travel in September 2023 but no injury or incident occurring.   Was doing walking program at mall 30-35 minutes with SLC in winter 2023 when she felt her best and now reports inability to tolerate 30-35 minutes secondary to her pain.    Pt medications, PMH and allergies reviewed in chart and with  patient today.     The patient states that she has intermittent pain/soreness in her hip.  Pain with moving certain directions, also feels a pull in her hip.  Pull is noted  around her incision.  She notes some swelling in her leg today, she has been using a compression stocking and ice on her foot to help with the swelling.  She denies any N&T into R LE.      Has not slept in her bed yet, has been on her recliner or sofa.   She had talked to her PA about her brace, was told to use it only for her first PT appointment.  She notes that she has not been wearing it consistently at home because at her follow up appointment the doctor told her she could stop using it.      Hannah 24- Pt has been seen in tx for 10 post op visits. She is progressing well with R hip function and pain control she notes but experiencing a recent flare of chronic R knee and R LBP with progression of WB advancement. Pt notes she is using cane  at all times except for am getting OOB in am,  using walker.  Pt. Notes no R hip pain, swelling. She notes trialing lying on R side for sleep.  Pt notes if LBP persists  or increases, she will reach out to her spine specialist and may discuss radiofrequency ablation as tx option or other tx options.   Patient Goals  Patient goals for therapy: decreased pain, increased strength, increased motion, independence with ADLs/IADLs and improved balance  Patient goal: decrease pain medications to manage her pain, to stand to complete cooking, cleaning, ambulation longer and on stairs improved  Pain  Current pain ratin  At best pain ratin  Location: R Hip pain 0/10,  4/10 R LB and R knee  Quality: pressure, pulling and discomfort  Relieving factors: ice and rest  Aggravating factors: standing and walking  Progression: improved (improved R hip and increase pain in R LB and R knee)    Social Support  Steps to enter house: yes  1  Stairs in house: yes   Lives in: multiple-level home  Lives with: spouse    Employment status: not working (retired)  Hand dominance: right    Treatments  Previous treatment: physical therapy, medication and injection treatment        Objective      Observations     Right Hip  Positive for incision.     Additional Observation Details  Incision present along R posterior/lateral hip which is healing well with no signs of infection present. Decrease in sof t tissue mobility.      Neurological Testing     Sensation     Hip   Left Hip   Intact: light touch    Right Hip   Intact: light touch    Reflexes   Left   Patellar (L4): normal (2+)  Achilles (S1): trace (1+)    Right   Patellar (L4): normal (2+)  Achilles (S1): trace (1+)    Active Range of Motion   Left Hip   Flexion: 100 degrees   Extension: 0 degrees   Abduction: 30 degrees     Right Hip   Flexion: 100 degrees with pain  Extension: 0 degrees   Abduction: 20 degrees with pain    Additional Active Range of Motion Details  R  AA SLR: 50  L SLR: 50    Passive Range of Motion     Right Hip   Flexion: 100 degrees   Abduction: 35 degrees     Strength/Myotome Testing     Left Hip   Planes of Motion   Flexion: 4+  Extension: 4  Abduction: 4  Adduction: 4+  External rotation: 4+  Internal rotation: 4+    Right Hip   Planes of Motion   Flexion: 3+  Extension: 3+  Abduction: 3+  Adduction: 4-  External rotation: 3+  Internal rotation: 3+    Left Knee   Flexion: 4+  Extension: 4+    Right Knee   Flexion: 3-  Extension: 3-  Quadriceps contraction: fair    Left Ankle/Foot   Dorsiflexion: 4+  Plantar flexion: 4+  Inversion: 4+  Eversion: 4+  Great toe flexion: 4+  Great toe extension: 4+    Right Ankle/Foot   Dorsiflexion: 4+  Plantar flexion: 4+  Eversion: 4+  Great toe flexion: 4+  Great toe extension: 4+    Ambulation   Weight-Bearing Status   Assistive device used: two-wheeled walker and single point cane    Additional Weight-Bearing Status Details  Using [facility] for household and short community distances, using FW walker at home in am or prn for LBP, R Knee pain reduction and improved gait mechanics.       Has shower chair, has handles for her toilet.    Not using  Breg hip brace      Ambulation: Stairs   Ascend  "stairs: independent  Pattern: non-reciprocal  Railings: one rail  Descend stairs: independent  Pattern: non-reciprocal  Railings: one rail    Additional Stairs Ambulation Details  Using cand and one HR nonreciprocal as R knee pain and LBP occurs with task    Observational Gait   Walking speed within functional limits. Decreased stride length.              Precautions: DOS: 2/15/24 (R hip trochanteric bursectomy with gluteus medius repair and IT band transfer), Lumbar surgery, R THR, HTN      Daily Treatment Diary:      Initial Evaluation Date: 03/13/24  Compliance 3/13  3/15  3/19  3/21  3/26  3/29  4/2  4/4  4/10  4/11   Visit Number 1 2  3  4  5  6  7  8  9  10   Re-Eval  IE                 MC   Foto Captured Y           Y    Y          3/13  3/15  3/19  3/21  3/26  3/29  4/2  4/4  4/10  4/11   Manual                      R Hip  10 min   15m  10m  10 m  10m  10m  -    10m     (50% WB c RW, no flexion past 90, no add past neutral, no active abd)                   STM R scar, hip / lumbar soft tissue             10m  -    10m   Ther-Ex                      NuStep             begin upright bike-t! Upright bike seat 7 1.3 mi, L1    upright bike L1 seat 7 5 m   Ankle Pumps HEP  20x  d/c to HEP  -    hip abd sumbax iso H/L and neutral hip 2 x 10, 3\" Hip abd submax iso H/L and neutral hip 20x 3\"  --    hip abd iso hip flexed and neutral 2x10, 3\"   Seated March HEP  20x  H/L  H/L AA 2 x 10  H/L AA 20x   H/L AA 20x H/L 15 x   --       LAQ HEP 20x  SAQ/LAQ 20x   SAQ/LAQ 20x  SAQ 1# 20x3\", LAQ 1#  20x  SAQ   2 # 20x/3\"/LAQ 2# 20x3\" SAQ  3# 20x3\"//LAQ 3# 20x3\"  --    LAQ/SAQ AROM 2 x 10 3\"   QSets HEP  5\"x20  5\"x20  20x5\"  20x5\"  20x5\"  10x10\" St hip 2 ways abd, SLR 20x    st hip 3  flex, abd, ext  20x   Glut Sets HEP  5\"x20  5\"x20  20x5  20x5\"  20x5\"  d/c to HEP         Heel Slides  PTA guided 20x     PT guided A on slide board 20x  A slide board 20x  -    AA  20x   Hip Add w/Ball     5\" x 15 submax  5\"x20 submax  20x5\" " "submax  20x5\"  20x5\"  -    20x5\"   Hip ext iso     5\" x 15 submax  5' x20 subamx  5'x 20  5\"x20  5\" x 20      20x5\"   Reviewed and issued HEP Pt ed on HEP, POC                     Neuro Re-Ed                      Mini squats             15x  -  15x       Lunges with TA             Step ups                    Weight shifting  as cee onto RLE in // 3m  step taps 4\" 2m HR A W/S F/B WBAT 2m             Amb with SLC 100ft. X 2  -   Ther-Act              STS   10x hi low table c HHA  10x hi low modifed height no UE A  -  10x high low modified height no UE A  10x high low modified height no UE A  -         Amb/gait tr     100ft. X 2 50% WB walker use cues upright posture //bars PWB RLE 5 laps cues upright WBAT //bars cues posture, stride length WBAT RLE 6 laps   4 laps posture cues, stride length WBAT RLE          Educ in core stab- TA with BKFOs, PPT, LTR short arc, seated overhead reach with TA       Modalities                      CP R hip    HP L/S post tx 10m HP L/S, R glut post tx CP R hip L/S post tx 10 m -- --  HP x 10 m LB and R hip mild                                           Access Code: [email or web address]  URL: [email or web address]  Date: 03/13/2024  Prepared by: Dorie    Exercises  - Long Sitting Quad Set  - 1 x daily - 7 x weekly - 2 sets - 10 reps - 3\" hold  - Supine Gluteal Sets  - 1 x daily - 7 x weekly - 2 sets - 10 reps - 3\" hold  - Seated Gluteal Sets  - 1 x daily - 7 x weekly - 2 sets - 10 reps - 3\" hold  - Seated Long Arc Quad  - 1 x daily - 7 x weekly - 2 sets - 10 reps - 3\" hold  - Seated March  - 1 x daily - 7 x weekly - 2 sets - 10 reps  - Supine Ankle Pumps  - 1 x daily - 7 x weekly - 2 sets - 10 reps                      "

## 2024-04-11 NOTE — PROGRESS NOTES
PT Re-Evaluation     Today's date: 2024  Patient name: RACHNA Pulido  : 1950  MRN: [id number]  Referring provider: Lexi Green MD  Dx:   Encounter Diagnosis     ICD-10-CM    1. Pain in right hip  M25.551       2. Trochanteric bursitis, right hip  M70.61       3. Status post hip surgery  Z98.890                          Assessment  Assessment details: The patient is a 75 y/o female who presents to PT s/p R hip surgery (R hip trochanteric bursectomy with gluteus medius repair and IT band transfer) on 2/15/24 by Dr. Pelletier.  She has complaints of intermittent pulling/pain in her hip.  She demonstrates deficits with decreased ROM and strength, decreased flexibility, antalgic gait with use of brace and AD, decreased balance and proprioception and edema.  These deficits lead to difficulty with stair negotiation, gait dysfunction and pain with completing her ADLs and tasks at home.  She is I with most tasks but her  has been assisting with LE dressing.  She is also using a shower chair when bathing.  She ambulates with use of brace locked 0-90* and RW for household and community distances.  Slow bashir is noted along with decreased weight shift to RLE in stance phase.  She has difficulty with stair negotiation - has been using the walker and going up and down the steps with non-reciprocal gait pattern.  She also has difficulty sleeping, has been sleeping on the recliner or sofa.  She is not currently driving.  Secondary to above deficits she is limited with her overall mobility and function.  The patient would benefit from continued PT to address deficits and improve function.  Tx to include ROM, stretching, strengthening, modalities, HEP, pt education, postural ed, lifting/body mechanics, neuro re-ed, balance/proprioception Te, MT and equipment.      Updtae 24- Pt has been treated  10  times in Physical Therapy for care of R hip s/p gluteus minimus reapir and ITB tendon transfer  compelted 2/15/24. Pt notes increase in functional WB tolerances and sleep in bed. Pt has progressed with WBAT RLE to cane use but is finding increase in chronic R Knee pain and R LBP with activity increase. She was educated in use of walker to assist with LBP and R Knee pain prn. Pt nnotes good tolerance to WB of R hip with out pain but does note intermittent soreness with function and lying onto R side for sleep.  She presents with tenderness to palpation of scar, R gluteal soft tissue, R  thoraco/lumbar  PVMs. She has mild swelling generalized in R knee with ROM 5-125 degrees. R hip ROM  and strength has progressed  as anticipated following surgery. Pt has progressed with WB AT RLE. She does require UE A for SLB On RLE currently and denies any hip pain sx. With task. Pt amb   with SLC  100ft.  Distance with non antalgic gait with decrease trunk rotation and RUE arm swing.  Pt will benefit from continued skilled care to achieve PT goals of care.            Impairments: abnormal gait, abnormal or restricted ROM, impaired physical strength, lacks appropriate home exercise program, pain with function, weight-bearing intolerance and poor posture   Other impairment: decreased flexibility    Symptom irritability: moderateUnderstanding of Dx/Px/POC: good   Prognosis: good    Goals  STGs:  1.  Initiate and complete HEP with verbal cues. progressing  2.  Improve R hip ROM by 5-10 degrees in 4 weeks. achieved  3.  Improve R LE strength by 1/2 grade in 4 weeks. achieved  4.  Decrease R hip pain by 25% in 4 weeks achieved.  LTGs: progressing  1.  Patient to be I with HEP in 12 weeks.  2.  Improve R Hip ROM to WNL t/o in 12 weeks to improve function.  3.  Improve R LE strength to > or = to 4 to 4+/5 t/o in 12 weeks to improve function.  4.  Decrease R hip pain to < or = to 1-2/10 with activity in 12 weeks to improve function.   5.  Patient to ambulate with normalized gait pattern without AD in 12 weeks.  6.  Stair negotiation  is improved to PLOF in 12 weeks.  7.  Recreational performance is improved to PLOF in 12 weeks.  8.  ADL performance is improved to PLOF in 12 weeks.            Plan  Plan details:    Patient would benefit from: skilled physical therapy  Planned modality interventions: cryotherapy and unattended electrical stimulation  Planned therapy interventions: abdominal trunk stabilization, gait training, functional ROM exercises, stretching, strengthening, neuromuscular re-education, manual therapy, home exercise program, therapeutic exercise, postural training, patient education, self care, balance and balance/weight bearing training  Frequency: 2x week  Duration in weeks: 5  Plan of Care beginning date: 4/11/2024  Plan of Care expiration date: 5/16/2024  Treatment plan discussed with: patient        Subjective Evaluation    History of Present Illness  Mechanism of injury: The was previously seen at this clinic for PT treatment from 10/24/23 to 1/18/24 for her R hip.  After she had been discharged she had seen the doctor and had surgery scheduled.  She had then been scheduled for surgery by Dr. Pelletier.  Surgery was 2/15/24, same day surgery at the surgical center in [place].  She had gone back to see the doctor for one follow up appointment, had her staples removed, per patient the doctor is happy with how she is doing.  She will be going back to see the doctor on 3/25 for her follow up appointment.      From EMR review of PT rosa from 10/24/23:  Chronc pain in R lateral hip since about one year duration. She has had PT in the past with improvements- most recently in 3/23 at our facility.  In the past, she has tried heel lifts,  AD of cane, salon pas, arnica gel, tylenol, mobic or celebrex, CP, injections in Each Hip  and lumbar spine, lumbar rhizotomies. Most recent injections into R hip were completed 1/23. Most recent rhizotomy  8/15/23 with some relief reported. Lumbar fusion L4-5 2022.   Pt was recently to   [facility] for assessment of R gluteus minimus tear and open repair was recommended she reports.  Pt notes she is seeking another opinion from [facility].   Pt notes regression in actvity tolerances and pain increases since June 2023 with out injury or known CADEN, but did travel for family vacation and had increase in pain sx.  She did note increase in sx of pain again with travel in September 2023 but no injury or incident occurring.   Was doing walking program at mall 30-35 minutes with SLC in winter 2023 when she felt her best and now reports inability to tolerate 30-35 minutes secondary to her pain.    Pt medications, PMH and allergies reviewed in chart and with  patient today.     The patient states that she has intermittent pain/soreness in her hip.  Pain with moving certain directions, also feels a pull in her hip.  Pull is noted around her incision.  She notes some swelling in her leg today, she has been using a compression stocking and ice on her foot to help with the swelling.  She denies any N&T into R LE.      Has not slept in her bed yet, has been on her recliner or sofa.   She had talked to her PA about her brace, was told to use it only for her first PT appointment.  She notes that she has not been wearing it consistently at home because at her follow up appointment the doctor told her she could stop using it.      Hannah 4/11/24- Pt has been seen in tx for 10 post op visits. She is progressing well with R hip function and pain control she notes but experiencing a recent flare of chronic R knee and R LBP with progression of WB advancement. Pt notes she is using cane  at all times except for am getting OOB in am,  using walker.  Pt. Notes no R hip pain, swelling. She notes trialing lying on R side for sleep.  Pt notes if LBP persists  or increases, she will reach out to her spine specialist and may discuss radiofrequency ablation as tx option or other tx options.   Patient Goals  Patient goals for  therapy: decreased pain, increased strength, increased motion, independence with ADLs/IADLs and improved balance  Patient goal: decrease pain medications to manage her pain, to stand to complete cooking, cleaning, ambulation longer and on stairs improved  Pain  Current pain ratin  At best pain ratin  Location: R Hip pain 0/10,  4/10 R LB and R knee  Quality: pressure, pulling and discomfort  Relieving factors: ice and rest  Aggravating factors: standing and walking  Progression: improved (improved R hip and increase pain in R LB and R knee)    Social Support  Steps to enter house: yes  1  Stairs in house: yes   Lives in: multiple-level home  Lives with: spouse    Employment status: not working (retired)  Hand dominance: right    Treatments  Previous treatment: physical therapy, medication and injection treatment        Objective     Observations     Right Hip  Positive for incision.     Additional Observation Details  Incision present along R posterior/lateral hip which is healing well with no signs of infection present. Decrease in sof t tissue mobility.      Neurological Testing     Sensation     Hip   Left Hip   Intact: light touch    Right Hip   Intact: light touch    Reflexes   Left   Patellar (L4): normal (2+)  Achilles (S1): trace (1+)    Right   Patellar (L4): normal (2+)  Achilles (S1): trace (1+)    Active Range of Motion   Left Hip   Flexion: 100 degrees   Extension: 0 degrees   Abduction: 30 degrees     Right Hip   Flexion: 100 degrees with pain  Extension: 0 degrees   Abduction: 20 degrees with pain    Additional Active Range of Motion Details  R  AA SLR: 50  L SLR: 50    Passive Range of Motion     Right Hip   Flexion: 100 degrees   Abduction: 35 degrees     Strength/Myotome Testing     Left Hip   Planes of Motion   Flexion: 4+  Extension: 4  Abduction: 4  Adduction: 4+  External rotation: 4+  Internal rotation: 4+    Right Hip   Planes of Motion   Flexion: 3+  Extension: 3+  Abduction:  3+  Adduction: 4-  External rotation: 3+  Internal rotation: 3+    Left Knee   Flexion: 4+  Extension: 4+    Right Knee   Flexion: 3-  Extension: 3-  Quadriceps contraction: fair    Left Ankle/Foot   Dorsiflexion: 4+  Plantar flexion: 4+  Inversion: 4+  Eversion: 4+  Great toe flexion: 4+  Great toe extension: 4+    Right Ankle/Foot   Dorsiflexion: 4+  Plantar flexion: 4+  Eversion: 4+  Great toe flexion: 4+  Great toe extension: 4+    Ambulation   Weight-Bearing Status   Assistive device used: two-wheeled walker and single point cane    Additional Weight-Bearing Status Details  Using SLC for household and short community distances, using FW walker at home in am or prn for LBP, R Knee pain reduction and improved gait mechanics.       Has shower chair, has handles for her toilet.    Not using  Breg hip brace      Ambulation: Stairs   Ascend stairs: independent  Pattern: non-reciprocal  Railings: one rail  Descend stairs: independent  Pattern: non-reciprocal  Railings: one rail    Additional Stairs Ambulation Details  Using cand and one HR nonreciprocal as R knee pain and LBP occurs with task    Observational Gait   Walking speed within functional limits. Decreased stride length.              Precautions: DOS: 2/15/24 (R hip trochanteric bursectomy with gluteus medius repair and IT band transfer), Lumbar surgery, R THR, HTN      Daily Treatment Diary:      Initial Evaluation Date: 03/13/24  Compliance 3/13  3/15  3/19  3/21  3/26  3/29  4/2  4/4  4/10  4/11   Visit Number 1 2  3  4  5  6  7  8  9  10   Re-Eval  IE                    Foto Captured Y           Y    Y          3/13  3/15  3/19  3/21  3/26  3/29  4/2  4/4  4/10  4/11   Manual                      R Hip  10 min   15m  10m  10 m  10m  10m  -    10m     (50% WB c RW, no flexion past 90, no add past neutral, no active abd)                   STM R scar, hip / lumbar soft tissue             10m  -    10m   Ther-Ex                      NuStep             begin  "upright bike-t! Upright bike seat 7 1.3 mi, L1    upright bike L1 seat 7 5 m   Ankle Pumps HEP  20x  d/c to HEP  -    hip abd sumbax iso H/L and neutral hip 2 x 10, 3\" Hip abd submax iso H/L and neutral hip 20x 3\"  --    hip abd iso hip flexed and neutral 2x10, 3\"   Seated March HEP  20x  H/L  H/L AA 2 x 10  H/L AA 20x   H/L AA 20x H/L 15 x   --       LAQ HEP 20x  SAQ/LAQ 20x   SAQ/LAQ 20x  SAQ 1# 20x3\", LAQ 1#  20x  SAQ   2 # 20x/3\"/LAQ 2# 20x3\" SAQ  3# 20x3\"//LAQ 3# 20x3\"  --    LAQ/SAQ AROM 2 x 10 3\"   QSets HEP  5\"x20  5\"x20  20x5\"  20x5\"  20x5\"  10x10\" St hip 2 ways abd, SLR 20x    st hip 3  flex, abd, ext  20x   Glut Sets HEP  5\"x20  5\"x20  20x5  20x5\"  20x5\"  d/c to HEP         Heel Slides  PTA guided 20x     PT guided A on slide board 20x  A slide board 20x  -    AA  20x   Hip Add w/Ball     5\" x 15 submax  5\"x20 submax  20x5\" submax  20x5\"  20x5\"  -    20x5\"   Hip ext iso     5\" x 15 submax  5' x20 subamx  5'x 20  5\"x20  5\" x 20      20x5\"   Reviewed and issued HEP Pt ed on HEP, POC                     Neuro Re-Ed                      Mini squats             15x  -  15x       Lunges with TA             Step ups                    Weight shifting  as cee onto RLE in // 3m  step taps 4\" 2m HR A W/S F/B WBAT 2m             Amb with SLC 100ft. X 2  -   Ther-Act              STS   10x hi low table c HHA  10x hi low modifed height no UE A  -  10x high low modified height no UE A  10x high low modified height no UE A  -         Amb/gait tr     100ft. X 2 50% WB walker use cues upright posture //bars PWB RLE 5 laps cues upright WBAT //bars cues posture, stride length WBAT RLE 6 laps   4 laps posture cues, stride length WBAT RLE          Educ in core stab- TA with BKFOs, PPT, LTR short arc, seated overhead reach with TA       Modalities                      CP R hip    HP L/S post tx 10m HP L/S, R glut post tx CP R hip L/S post tx 10 m -- --  HP x 10 m LB and R hip mild                                           " "Access Code: [email or web address]  URL: [email or web address]  Date: 03/13/2024  Prepared by: Dorie    Exercises  - Long Sitting Quad Set  - 1 x daily - 7 x weekly - 2 sets - 10 reps - 3\" hold  - Supine Gluteal Sets  - 1 x daily - 7 x weekly - 2 sets - 10 reps - 3\" hold  - Seated Gluteal Sets  - 1 x daily - 7 x weekly - 2 sets - 10 reps - 3\" hold  - Seated Long Arc Quad  - 1 x daily - 7 x weekly - 2 sets - 10 reps - 3\" hold  - Seated March  - 1 x daily - 7 x weekly - 2 sets - 10 reps  - Supine Ankle Pumps  - 1 x daily - 7 x weekly - 2 sets - 10 reps      "

## 2024-04-16 ENCOUNTER — OFFICE VISIT (OUTPATIENT)
Dept: PHYSICAL THERAPY | Facility: CLINIC | Age: 74
End: 2024-04-16
Payer: MEDICARE

## 2024-04-16 DIAGNOSIS — M70.61 TROCHANTERIC BURSITIS, RIGHT HIP: ICD-10-CM

## 2024-04-16 DIAGNOSIS — M25.551 PAIN IN RIGHT HIP: Primary | ICD-10-CM

## 2024-04-16 DIAGNOSIS — Z98.890 STATUS POST HIP SURGERY: ICD-10-CM

## 2024-04-16 PROCEDURE — 97110 THERAPEUTIC EXERCISES: CPT | Performed by: PHYSICAL THERAPIST

## 2024-04-16 PROCEDURE — 97112 NEUROMUSCULAR REEDUCATION: CPT | Performed by: PHYSICAL THERAPIST

## 2024-04-16 PROCEDURE — 97140 MANUAL THERAPY 1/> REGIONS: CPT | Performed by: PHYSICAL THERAPIST

## 2024-04-16 NOTE — PROGRESS NOTES
Daily Note     Today's date: 2024  Patient name: RACHNA Pulido  : 1950  MRN: [id number]  Referring provider: Lexi Green MD  Dx:   Encounter Diagnosis     ICD-10-CM    1. Pain in right hip  M25.551       2. Trochanteric bursitis, right hip  M70.61       3. Status post hip surgery  Z98.890                      Subjective: Pt notes she is doing well today with out pain. Notes intermittent R LBP, R knee pain but no hip pain. She notes cont to use SLC for amb as feels weak  and not steady when tries to go without it.       Objective: See treatment diary below. Pt required min cues with tasks for upright posture with exercises. Required cues for increase in stride length B Les with amb. She progressed to AA hip abd in L S/L, AA clam shell in L S/L today without pain sx and cues for form required. PT completed 50-75 % of task. Pt noted no pain sx. Pt was able to complete H/L semi reclined 3 x 5 reps of ASLR with QS without pain sx. PROM R hip 95 flex, 30 abd, PSLR 45-50 deg. WB tasks as documented completed without pain back or R knee today. CP utilized post tx to decrease post tx soreness.       Assessment: Tolerated treatment well. Patient demonstrated fatigue post treatment and would benefit from continued PT to achieve goals of care.       Plan: Continue per plan of care.      Precautions: DOS: 2/15/24 (R hip trochanteric bursectomy with gluteus medius repair and IT band transfer), Lumbar surgery, R THR, HTN      Daily Treatment Diary:      Initial Evaluation Date: 24  Compliance 4/16  3/15  3/19  3/21  3/26  3/29  4/2  4/4  4/10  4/11   Visit Number 11 2  3  4  5  6  7  8  9  10   Re-al  -                    Foto Captured -           Y    Y          4/16  3/15  3/19  3/21  3/26  3/29  4/2  4/4  4/10  4/11   Manual                      R Hip 10m 10 min   15m  10m  10 m  10m  10m  -    10m     (50% WB c RW, no flexion past 90, no add past neutral, no active abd)                   STM R  "scar, hip / lumbar soft tissue 10m            10m  -    10m   Ther-Ex                      NuStep Upright bike 10 m L 1            begin upright bike-t! Upright bike seat 7 1.3 mi, L1    upright bike L1 seat 7 5 m   Ankle Pumps Hip isometrics 20x 5\" ea flex, abd, add, ext  20x  d/c to HEP  -    hip abd sumbax iso H/L and neutral hip 2 x 10, 3\" Hip abd submax iso H/L and neutral hip 20x 3\"  --    hip abd iso hip flexed and neutral 2x10, 3\"   Seated March H/L march 2 x 15  20x  H/L  H/L AA 2 x 10  H/L AA 20x   H/L AA 20x H/L 15 x   --       LAQ/SAQ 2 x 15, 3\" 20x  SAQ/LAQ 20x   SAQ/LAQ 20x  SAQ 1# 20x3\", LAQ 1#  20x  SAQ   2 # 20x/3\"/LAQ 2# 20x3\" SAQ  3# 20x3\"//LAQ 3# 20x3\"  --    LAQ/SAQ AROM 2 x 10 3\"   QSets St hip 3 way 2 x 10  5\"x20  5\"x20  20x5\"  20x5\"  20x5\"  10x10\" St hip 2 ways abd, SLR 20x    st hip 3  flex, abd, ext  20x      5\"x20  5\"x20  20x5  20x5\"  20x5\"  d/c to HEP         Heel Slides A 20x  PTA guided 20x     PT guided A on slide board 20x  A slide board 20x  -    AA  20x   Hip Add w/Ball     5\" x 15 submax  5\"x20 submax  20x5\" submax  20x5\"  20x5\"  -    20x5\"   Hip ext iso  Hip abd SLR, clam shell in L S/L- mod A Of PT  2 x 10 ea    5\" x 15 submax  5' x20 subamx  5'x 20  5\"x20  5\" x 20      20x5\"   Reviewed and issued HEP Pt ed on HEP, POC                     Neuro Re-Ed                      Mini squats    2 x 15          15x  -  15x       Lunges with TA             Step ups             Step taps 4\" Alt 1 m HR A      Weight shifting  as cee onto RLE in // 3m  step taps 4\" 2m HR A W/S F/B WBAT 2m             Amb with SLC 100ft. X 2  -   Ther-Act              STS   10x hi low table c HHA  10x hi low modifed height no UE A  -  10x high low modified height no UE A  10x high low modified height no UE A  -         Amb/gait tr 4 laps fwd and 2 laps side step in //bars with UE  A    100ft. X 2 50% WB walker use cues upright posture //bars PWB RLE 5 laps cues upright WBAT //bars cues posture, stride length " "WBAT RLE 6 laps   4 laps posture cues, stride length WBAT RLE          Educ in core stab- TA with BKFOs, PPT, LTR short arc, seated overhead reach with TA       Modalities                      CP R hip CP LB R hip post tx x 10 min   HP L/S post tx 10m HP L/S, R glut post tx CP R hip L/S post tx 10 m -- --  HP x 10 m LB and R hip mild                                           Access Code: [email or web address]  URL: [email or web address]  Date: 03/13/2024  Prepared by: Dorie    Exercises  - Long Sitting Quad Set  - 1 x daily - 7 x weekly - 2 sets - 10 reps - 3\" hold  - Supine Gluteal Sets  - 1 x daily - 7 x weekly - 2 sets - 10 reps - 3\" hold  - Seated Gluteal Sets  - 1 x daily - 7 x weekly - 2 sets - 10 reps - 3\" hold  - Seated Long Arc Quad  - 1 x daily - 7 x weekly - 2 sets - 10 reps - 3\" hold  - Seated March  - 1 x daily - 7 x weekly - 2 sets - 10 reps  - Supine Ankle Pumps  - 1 x daily - 7 x weekly - 2 sets - 10 reps           "

## 2024-04-18 ENCOUNTER — OFFICE VISIT (OUTPATIENT)
Dept: PHYSICAL THERAPY | Facility: CLINIC | Age: 74
End: 2024-04-18
Payer: MEDICARE

## 2024-04-18 DIAGNOSIS — M70.61 TROCHANTERIC BURSITIS, RIGHT HIP: ICD-10-CM

## 2024-04-18 DIAGNOSIS — M25.551 PAIN IN RIGHT HIP: Primary | ICD-10-CM

## 2024-04-18 DIAGNOSIS — Z98.890 STATUS POST HIP SURGERY: ICD-10-CM

## 2024-04-18 PROCEDURE — 97110 THERAPEUTIC EXERCISES: CPT | Performed by: PHYSICAL THERAPIST

## 2024-04-23 ENCOUNTER — OFFICE VISIT (OUTPATIENT)
Dept: PHYSICAL THERAPY | Facility: CLINIC | Age: 74
End: 2024-04-23
Payer: MEDICARE

## 2024-04-23 DIAGNOSIS — M25.551 PAIN IN RIGHT HIP: Primary | ICD-10-CM

## 2024-04-23 DIAGNOSIS — Z98.890 STATUS POST HIP SURGERY: ICD-10-CM

## 2024-04-23 DIAGNOSIS — M70.61 TROCHANTERIC BURSITIS, RIGHT HIP: ICD-10-CM

## 2024-04-23 PROCEDURE — 97110 THERAPEUTIC EXERCISES: CPT | Performed by: PHYSICAL THERAPIST

## 2024-04-23 PROCEDURE — 97112 NEUROMUSCULAR REEDUCATION: CPT | Performed by: PHYSICAL THERAPIST

## 2024-04-23 PROCEDURE — 97530 THERAPEUTIC ACTIVITIES: CPT | Performed by: PHYSICAL THERAPIST

## 2024-04-23 NOTE — PROGRESS NOTES
"Daily Note     Today's date: 2024  Patient name: RACHNA Pulido  : 1950  MRN: [id number]  Referring provider: Lexi Green MD  Dx:   Encounter Diagnosis     ICD-10-CM    1. Pain in right hip  M25.551       2. Trochanteric bursitis, right hip  M70.61       3. Status post hip surgery  Z98.890                      Subjective: Pt notes she is walking at times without her SLC and feels R hip is not strong to not have hip drop, feels no pain sx.  Feels not having enough strength to amb up stairs with reciprocal gait yet and cont to use step to step technique. Pt notes she does cont to have R and now L LBP and has reached out to her spinal specialist for consideration of ablation. Notes intermittent R knee pain and stiffness as limiting with WB function.       Objective: See treatment diary below. SLB on RLE limited by decrease strength in R hip, LBP and R knee discomfort. Requires posture cueing and UE A for task. Pt. Demonstrated amb without AD and shows decrease strength of R hip abduction and difficulty with maintaining neutral pelvis with stance on RLE. Progressed with step ups on 4\" RLE with B HR A. Progressed to B Hip patterns  in standing flex, abd, ext with UE A and posture cues. Pt notes fatigue of R Hip and discomfort in R LB and R Knee with prolonged WB On RLE and did require brief rest seated after task. Completed upright LBE x 5m with good tolerance. Pt amb with SLC with cues for longer stride and upright posturing, heel strike on RLE. She noted fatigue with task and mild RLB and R Knee discomfort with no pain sx.       Assessment: Tolerated treatment well. Patient demonstrated fatigue post treatment and would benefit from continued PT to achieve goals of care.       Plan: Continue per plan of care.      Precautions: DOS: 2/15/24 (R hip trochanteric bursectomy with gluteus medius repair and IT band transfer), Lumbar surgery, R THR, HTN      Daily Treatment Diary:      Initial Evaluation " "Date: 03/13/24  Compliance 4/16 4/18 4/23  3/21  3/26  3/29  4/2  4/4  4/10  4/11   Visit Number 11 12  13  4  5  6  7  8  9  10   [place]  [facility] [place]   Foto Captured -           Y    Y          4/16 4/18 4/23  3/21  3/26  3/29  4/2  4/4  4/10  4/11   Manual                      R Hip PROM 10m --> -->  10m  10 m  10m  10m  -    10m                        STM R scar, hip / lumbar soft tissue 10m  10m  -->        10m  -    10m   Ther-Ex                      NuStep Upright bike 10 m L 1  1m sore  5 m seat 7, L1        begin upright bike-t! Upright bike seat 7 1.3 mi, L1    upright bike L1 seat 7 5 m   Ankle Pumps Hip isometrics 20x 5\" ea flex, abd, add, ext Hip isometrics 20x 5\" ea flex, abd, add, ext -->  -    hip abd sumbax iso H/L and neutral hip 2 x 10, 3\" Hip abd submax iso H/L and neutral hip 20x 3\"  --    hip abd iso hip flexed and neutral 2x10, 3\"   Seated March H/L march 2 x 15 H/L march 2 x 15 Standing march 2 x 10  H/L AA 2 x 10  H/L AA 20x   H/L AA 20x H/L 15 x   --       LAQ/SAQ 2 x 15, 3\" 2x10 2# 3\" -->   SAQ/LAQ 20x  SAQ 1# 20x3\", LAQ 1#  20x  SAQ   2 # 20x/3\"/LAQ 2# 20x3\" SAQ  3# 20x3\"//LAQ 3# 20x3\"  --    LAQ/SAQ AROM 2 x 10 3\"   QSets St hip 3 way 2 x 10 St hip 3 way 2 x 10 St 3 way B 2 x 10  20x5\"  20x5\"  20x5\"  10x10\" St hip 2 ways abd, SLR 20x    st hip 3  flex, abd, ext  20x   ASLRS    4 x 5 reps -->  20x5  20x5\"  20x5\"  d/c to HEP         Heel Slides A 20x  A 20x  -->   PT guided A on slide board 20x  A slide board 20x  -    AA  20x   Clam shell   2 x 10 ea -->  5\"x20 submax  20x5\" submax  20x5\"  20x5\"  -    20x5\"   Hip abd L S/L  Hip abd SLR, clam shell in L S/L- mod A Of PT  2 x 10 ea  AA 2 x 10 ea -->  5' x20 subamx  5'x 20  5\"x20  5\" x 20      20x5\"   Reviewed and issued HEP Pt ed on HEP, POC                     Neuro Re-Ed                      Mini squats    2 x 15  2 x 15  2 x 15 cues for form      15x  -  15x       Lunges with TA             Step ups   4\" 2 x 10 RLE BHR A        " "  Step taps 4\" Alt 1 m HR A - Alt 1 m 6\" x 2 BHR A    Weight shifting  as cee onto RLE in // 3m  step taps 4\" 2m HR A W/S F/B WBAT 2m     Weight shifting  F/B, S to S //bars  30x ea 30x ea WBAT RLE//TR 20x     Amb with SLC 100ft. X 2  -   Ther-Act              STS    10x hi low modifed height no UE A  -  10x high low modified height no UE A  10x high low modified height no UE A  -         Amb/gait tr 4 laps fwd and 2 laps side step in //bars with UE  A 4 laps fwd only f! Amb with SLC 100ft. X 2 cues longer stride upright posture  100ft. X 2 50% WB walker use cues upright posture //bars PWB RLE 5 laps cues upright WBAT //bars cues posture, stride length WBAT RLE 6 laps   4 laps posture cues, stride length WBAT RLE          Educ in core stab- TA with BKFOs, PPT, LTR short arc, seated overhead reach with TA       Modalities                      CP R hip CP LB R hip post tx x 10 min HP LB R hip post tx x 10 m CP LB and R hip post tx 10 m HP L/S post tx 10m HP L/S, R glut post tx CP R hip L/S post tx 10 m -- --  HP x 10 m LB and R hip mild                                           Access Code: [email or web address]  URL: [email or web address]  Date: 03/13/2024  Prepared by: Dorie    Exercises  - Long Sitting Quad Set  - 1 x daily - 7 x weekly - 2 sets - 10 reps - 3\" hold  - Supine Gluteal Sets  - 1 x daily - 7 x weekly - 2 sets - 10 reps - 3\" hold  - Seated Gluteal Sets  - 1 x daily - 7 x weekly - 2 sets - 10 reps - 3\" hold  - Seated Long Arc Quad  - 1 x daily - 7 x weekly - 2 sets - 10 reps - 3\" hold  - Seated March  - 1 x daily - 7 x weekly - 2 sets - 10 reps  - Supine Ankle Pumps  - 1 x daily - 7 x weekly - 2 sets - 10 reps               "

## 2024-04-25 ENCOUNTER — OFFICE VISIT (OUTPATIENT)
Dept: PHYSICAL THERAPY | Facility: CLINIC | Age: 74
End: 2024-04-25
Payer: MEDICARE

## 2024-04-25 DIAGNOSIS — Z98.890 STATUS POST HIP SURGERY: ICD-10-CM

## 2024-04-25 DIAGNOSIS — M25.551 PAIN IN RIGHT HIP: Primary | ICD-10-CM

## 2024-04-25 DIAGNOSIS — M70.61 TROCHANTERIC BURSITIS, RIGHT HIP: ICD-10-CM

## 2024-04-25 PROCEDURE — 97110 THERAPEUTIC EXERCISES: CPT | Performed by: PHYSICAL THERAPIST

## 2024-04-25 PROCEDURE — 97140 MANUAL THERAPY 1/> REGIONS: CPT | Performed by: PHYSICAL THERAPIST

## 2024-04-25 NOTE — PROGRESS NOTES
Daily Note     Today's date: 2024  Patient name: RACHNA Pulido  : 1950  MRN: [id number]  Referring provider: Lexi Green MD  Dx:   Encounter Diagnosis     ICD-10-CM    1. Pain in right hip  M25.551       2. Trochanteric bursitis, right hip  M70.61       3. Status post hip surgery  Z98.890                      Subjective: Pt notes good tolerance to last PT session with no pain following. She notes waking up  central lower back pain with pain  today and it has not responded to medication. Pt denies any hip pain  with function or exercises. Notes back pain is limiting WB function today.      Objective: See treatment diary below. Pre tx pain sx 6/10 R LB with decrease in pain sx to 2-3/10 post tx. Pt presented with hypertonicity of R T/L PVMs and R QL addressed with STM and TPR.  Pt  received HP to T/L spine pre tx and completed TE as documented below. Pt standing ex and LBE held secondary to her LBP. Pt notes she is having radiofrequency ablation in one month at [facility].       Assessment: Tolerated treatment well. Patient would benefit from continued PT to achieve goals of care. Back pain limiting WB activity today and modified tx plan for this reason.       Plan: Continue per plan of care.      Precautions: DOS: 2/15/24 (R hip trochanteric bursectomy with gluteus medius repair and IT band transfer), Lumbar surgery, R THR, HTN      Daily Treatment Diary:      Initial Evaluation Date: 24  Compliance 4/16 4/18 4/23 4/25  3/26  3/29  4/2  4/4  4/10  4/11   Visit Number 11 12  13  14  5  6  7  8  9  10   Re-[facility]   Foto Captured -           Y    Y          4/16 4/18 4/23  4/25  3/26  3/29  4/2  4/4  4/10  4/11   Manual                      R Hip PROM 10m --> -->  5m  10 m  10m  10m  -    10m                        STM R scar, hip / lumbar soft tissue 10m  10m  -->  10m R T/L PVM, QL      10m  -    10m   Ther-Ex                      NuStep Upright bike 10 m L 1  1m sore   "5 m seat 7, L1  --      begin upright bike-t! Upright bike seat 7 1.3 mi, L1    upright bike L1 seat 7 5 m   Ankle Pumps Hip isometrics 20x 5\" ea flex, abd, add, ext Hip isometrics 20x 5\" ea flex, abd, add, ext -->  --    hip abd sumbax iso H/L and neutral hip 2 x 10, 3\" Hip abd submax iso H/L and neutral hip 20x 3\"  --    hip abd iso hip flexed and neutral 2x10, 3\"   Seated March H/L march 2 x 15 H/L march 2 x 15 Standing march 2 x 10  H/L AA 2 x 10  H/L AA 20x   H/L AA 20x H/L 15 x   --       LAQ/SAQ 2 x 15, 3\" 2x10 2# 3\" -->   SAQ/LAQ 20x  SAQ 1# 20x3\", LAQ 1#  20x  SAQ   2 # 20x/3\"/LAQ 2# 20x3\" SAQ  3# 20x3\"//LAQ 3# 20x3\"  --    LAQ/SAQ AROM 2 x 10 3\"   QSets St hip 3 way 2 x 10 St hip 3 way 2 x 10 St 3 way B 2 x 10  20x5\"  20x5\"  20x5\"  10x10\" St hip 2 ways abd, SLR 20x    st hip 3  flex, abd, ext  20x   ASLRS    4 x 5 reps -->  30 x  20x5\"  20x5\"  d/c to HEP         Heel Slides A 20x  A 20x  --> --  PT guided A on slide board 20x  A slide board 20x  -    AA  20x   Clam shell   2 x 10 ea --> 2x10  20x5\" submax  20x5\"  20x5\"  -    20x5\"   Hip abd L S/L  Hip abd SLR, clam shell in L S/L- mod A Of PT  2 x 10 ea  AA 2 x 10 ea --> 2x10 fire hydrant, Hip abd straight knee cues for form  5'x 20  5\"x20  5\" x 20      20x5\"   Reviewed and issued HEP Pt ed on HEP, POC                     Neuro Re-Ed                      Mini squats    2 x 15  2 x 15  2 x 15 cues for form  --    15x  -  15x       Lunges with TA             Step ups   4\" 2 x 10 RLE BHR A --         Step taps 4\" Alt 1 m HR A - Alt 1 m 6\" x 2 BHR A --   Weight shifting  as cee onto RLE in // 3m  step taps 4\" 2m HR A W/S F/B WBAT 2m     Weight shifting  F/B, S to S //bars  30x ea 30x ea WBAT RLE//TR 20x --    Amb with SLC 100ft. X 2  -   Ther-Act              STS    10x hi low modifed height no UE A  --  10x high low modified height no UE A  10x high low modified height no UE A  -         Amb/gait tr 4 laps fwd and 2 laps side step in //bars with UE  A 4 laps " "fwd only f! Amb with SLC 100ft. X 2 cues longer stride upright posture  100ft. X 2 50% WB walker use cues upright posture //bars PWB RLE 5 laps cues upright WBAT //bars cues posture, stride length WBAT RLE 6 laps   4 laps posture cues, stride length WBAT RLE          Educ in core stab- TA with BKFOs, PPT, LTR short arc, seated overhead reach with TA       Modalities                      CP R hip CP LB R hip post tx x 10 min HP LB R hip post tx x 10 m CP LB and R hip post tx 10 m HP L/S pre tx 10m HP L/S, R glut post tx CP R hip L/S post tx 10 m -- --  HP x 10 m LB and R hip mild                                           Access Code: [email or web address]  URL: [email or web address]  Date: 03/13/2024  Prepared by: Dorie    Exercises  - Long Sitting Quad Set  - 1 x daily - 7 x weekly - 2 sets - 10 reps - 3\" hold  - Supine Gluteal Sets  - 1 x daily - 7 x weekly - 2 sets - 10 reps - 3\" hold  - Seated Gluteal Sets  - 1 x daily - 7 x weekly - 2 sets - 10 reps - 3\" hold  - Seated Long Arc Quad  - 1 x daily - 7 x weekly - 2 sets - 10 reps - 3\" hold  - Seated March  - 1 x daily - 7 x weekly - 2 sets - 10 reps  - Supine Ankle Pumps  - 1 x daily - 7 x weekly - 2 sets - 10 reps                 "

## 2024-04-30 ENCOUNTER — OFFICE VISIT (OUTPATIENT)
Dept: PHYSICAL THERAPY | Facility: CLINIC | Age: 74
End: 2024-04-30
Payer: MEDICARE

## 2024-04-30 DIAGNOSIS — M70.61 TROCHANTERIC BURSITIS, RIGHT HIP: ICD-10-CM

## 2024-04-30 DIAGNOSIS — M25.551 PAIN IN RIGHT HIP: Primary | ICD-10-CM

## 2024-04-30 DIAGNOSIS — Z98.890 STATUS POST HIP SURGERY: ICD-10-CM

## 2024-04-30 PROCEDURE — 97110 THERAPEUTIC EXERCISES: CPT | Performed by: PHYSICAL THERAPIST

## 2024-04-30 PROCEDURE — 97140 MANUAL THERAPY 1/> REGIONS: CPT | Performed by: PHYSICAL THERAPIST

## 2024-04-30 NOTE — PROGRESS NOTES
Daily Note     Today's date: 2024  Patient name: RACHNA Pulido  : 1950  MRN: [id number]  Referring provider: Lexi Green MD  Dx:   Encounter Diagnosis     ICD-10-CM    1. Pain in right hip  M25.551       2. Trochanteric bursitis, right hip  M70.61       3. Status post hip surgery  Z98.890                      Subjective: Pt notes she felt great following her last PT- felt no onset of back pain sx. Con to have back pain with amb longer distances, did not use walker over weekend. Denies any hip pain. Notes some weakness with amb with out SLC and does only do for a few steps.       Objective: See treatment diary below. Pt required A for hip abd with knee extended and to achieve end ranges with clam shell. Pt able to hold end range clam shell but has difficulty with reaching end range. Pt. Noted fatigue and decrease endurance with hip abd ex but noted no pain sx.  Cont to present with hypertonicity of R T/L PVM and R QL addressed with STM/TPR with good tolerance. Pt noted some LBP with transfer sidelying to sit  but had no hip pain sx with tx or following. CP utilized post tx to address any post tx soreness  with good tolerance.       Assessment: Tolerated treatment well. Patient demonstrated fatigue post treatment and would benefit from continued PT to achieve goals of care with modifications to avoid LBP prn.       Plan: Continue per plan of care.      Precautions: DOS: 2/15/24 (R hip trochanteric bursectomy with gluteus medius repair and IT band transfer), Lumbar surgery, R THR, HTN      Daily Treatment Diary:      Initial Evaluation Date: 24  Compliance 4/16 4/18 4/23 4/25 4/30  3/29  4/2  4/4  4/10  4/11   Visit Number 11 12  13  14 15  6  7  8  9  10   Re-al  -                    Foto Captured -           Y    Y          4/16 4/18 4/23  4/25 4/30  3/29  4/2  4/4  4/10  4/11   Manual                      R Hip PROM 10m --> -->  5m  5 m  10m  10m  -    10m                        STM R  "scar, hip / lumbar soft tissue 10m  10m  -->  10m R T/L PVM, QL  10m  R T/L PVM QL    10m  -    10m   Ther-Ex                      NuStep Upright bike 10 m L 1  1m sore  5 m seat 7, L1  --  --    begin upright bike-t! Upright bike seat 7 1.3 mi, L1    upright bike L1 seat 7 5 m   Ankle Pumps Hip isometrics 20x 5\" ea flex, abd, add, ext Hip isometrics 20x 5\" ea flex, abd, add, ext -->  --  --  hip abd sumbax iso H/L and neutral hip 2 x 10, 3\" Hip abd submax iso H/L and neutral hip 20x 3\"  --    hip abd iso hip flexed and neutral 2x10, 3\"   Seated March H/L march 2 x 15 H/L march 2 x 15 Standing march 2 x 10  H/L AA 2 x 10  H/L AA 20x   H/L AA 20x H/L 15 x   --       LAQ/SAQ 2 x 15, 3\" 2x10 2# 3\" -->   SAQ/LAQ 20x  SAQ 1# 20x3\", LAQ 1#  20x3\"  SAQ   2 # 20x/3\"/LAQ 2# 20x3\" SAQ  3# 20x3\"//LAQ 3# 20x3\"  --    LAQ/SAQ AROM 2 x 10 3\"   QSets St hip 3 way 2 x 10 St hip 3 way 2 x 10 St 3 way B 2 x 10  20x5\" 10x 2, 5\" standing  20x5\"  10x10\" St hip 2 ways abd, SLR 20x    st hip 3  flex, abd, ext  20x   ASLRS    4 x 5 reps -->  30 x 30x  20x5\"  d/c to HEP         Heel Slides A 20x  A 20x  --> -- AROM 30x  A on slide board 20x  A slide board 20x  -    AA  20x   Clam shell   2 x 10 ea --> 2x10 2x10  20x5\"  20x5\"  -    20x5\"   Hip abd L S/L  Hip abd SLR, clam shell in L S/L- mod A Of PT  2 x 10 ea  AA 2 x 10 ea --> 2x10 fire hydrant, Hip abd straight knee cues for form 2x10 fire hydrant , hip abd straight knee  5\"x20  5\" x 20      20x5\"   Reviewed and issued HEP Pt ed on HEP, POC                     Neuro Re-Ed                      Mini squats    2 x 15  2 x 15  2 x 15 cues for form  --    15x  -  15x       Lunges with TA             Step ups   4\" 2 x 10 RLE BHR A --         Step taps 4\" Alt 1 m HR A - Alt 1 m 6\" x 2 BHR A --   Weight shifting  as cee onto RLE in // 3m  step taps 4\" 2m HR A W/S F/B WBAT 2m     Weight shifting  F/B, S to S //bars  30x ea 30x ea WBAT RLE//TR 20x --    Amb with SLC 100ft. X 2  -   Ther-Act          " "    STS    10x hi low modifed height no UE A  --   10x high low modified height no UE A  -         Amb/gait tr 4 laps fwd and 2 laps side step in //bars with UE  A 4 laps fwd only f! Amb with SLC 100ft. X 2 cues longer stride upright posture   //bars PWB RLE 5 laps cues upright WBAT //bars cues posture, stride length WBAT RLE 6 laps   4 laps posture cues, stride length WBAT RLE          Educ in core stab- TA with BKFOs, PPT, LTR short arc, seated overhead reach with TA       Modalities                      CP R hip CP LB R hip post tx x 10 min HP LB R hip post tx x 10 m CP LB and R hip post tx 10 m HP L/S pre tx 10m CP L/S, R glut/hip  10m post tx CP R hip L/S post tx 10 m -- --  HP x 10 m LB and R hip mild                                           Access Code: [email or web address]  URL: [email or web address]  Date: 03/13/2024  Prepared by: Dorie    Exercises  - Long Sitting Quad Set  - 1 x daily - 7 x weekly - 2 sets - 10 reps - 3\" hold  - Supine Gluteal Sets  - 1 x daily - 7 x weekly - 2 sets - 10 reps - 3\" hold  - Seated Gluteal Sets  - 1 x daily - 7 x weekly - 2 sets - 10 reps - 3\" hold  - Seated Long Arc Quad  - 1 x daily - 7 x weekly - 2 sets - 10 reps - 3\" hold  - Seated March  - 1 x daily - 7 x weekly - 2 sets - 10 reps  - Supine Ankle Pumps  - 1 x daily - 7 x weekly - 2 sets - 10 reps                   "

## 2024-05-02 ENCOUNTER — OFFICE VISIT (OUTPATIENT)
Dept: PHYSICAL THERAPY | Facility: CLINIC | Age: 74
End: 2024-05-02
Payer: MEDICARE

## 2024-05-02 DIAGNOSIS — M25.551 PAIN IN RIGHT HIP: Primary | ICD-10-CM

## 2024-05-02 DIAGNOSIS — Z98.890 STATUS POST HIP SURGERY: ICD-10-CM

## 2024-05-02 DIAGNOSIS — M70.61 TROCHANTERIC BURSITIS, RIGHT HIP: ICD-10-CM

## 2024-05-02 PROCEDURE — 97140 MANUAL THERAPY 1/> REGIONS: CPT | Performed by: PHYSICAL THERAPIST

## 2024-05-02 PROCEDURE — 97110 THERAPEUTIC EXERCISES: CPT | Performed by: PHYSICAL THERAPIST

## 2024-05-02 NOTE — PROGRESS NOTES
"Daily Note     Today's date: 2024  Patient name: RACHNA Pulido  : 1950  MRN: [id number]  Referring provider: Lexi Green MD  Dx:   Encounter Diagnosis     ICD-10-CM    1. Pain in right hip  M25.551       2. Trochanteric bursitis, right hip  M70.61       3. Status post hip surgery  Z98.890                      Subjective: Pt reports she has appointment for rhizotomy 24. She notes back pain and L hip pain as limiting her WB function. She notes weakness with amb without cane and cont to use. She notes no pain in hip and states all of her pre op hip pain is resolved.       Objective: See treatment diary below      Assessment: Tolerated treatment well. Pt does demonstrate fatigue and decrease endurance with hip abduction in L S/L but has no pain sx.  She is progressing toward goals of care with modifications of tx to not increase her LBP sx. Patient would benefit from continued PT to achieve goals of care.       Plan: Continue per plan of care.      Precautions: DOS: 2/15/24 (R hip trochanteric bursectomy with gluteus medius repair and IT band transfer), Lumbar surgery, R THR, HTN      Daily Treatment Diary:      Initial Evaluation Date: 24  Compliance 4/16 4/18 4/23 4/25 4/30 5/2  4/2  4/4  4/10  4/11   Visit Number 11 12  13  14 15 16  7  8  9  10   Re-[facility]   Foto Captured -           Y    Y          4/16 4/18 4/23  4/25 4/30  5/2  4/2  4/4  4/10  4/11   Manual                      R Hip PROM 10m --> -->  5m  5 m 8m  10m  -    10m                        STM R scar, hip / lumbar soft tissue 10m  10m  -->  10m R T/L PVM, QL  10m  R T/L PVM QL  -  10m  -    10m   Ther-Ex                      NuStep Upright bike 10 m L 1  1m sore  5 m seat 7, L1  --  --    begin upright bike-t! Upright bike seat 7 1.3 mi, L1    upright bike L1 seat 7 5 m   Ankle Pumps Hip isometrics 20x 5\" ea flex, abd, add, ext Hip isometrics 20x 5\" ea flex, abd, add, ext -->  --  --  hip abd sumbax iso " "H/L and neutral hip 2 x 0, 3\" Hip abd submax iso H/L and neutral hip 20x 3\"  --    hip abd iso hip flexed and neutral 2x10, 3\"   Seated March H/L march 2 x 15 H/L march 2 x 15 Standing march 2 x 10  H/L AA 2 x 10  H/L AA 20x   H/L A 20x 2 H/L 15 x   --       LAQ/SAQ 2 x 15, 3\" 2x10 2# 3\" -->   SAQ/LAQ 20x  SAQ 1# 20x3\", LAQ 1#  20x3\"  SAQ   2 # 30x/3\"/LAQ 2# 30x3\" SAQ  3# 20x3\"//LAQ 3# 20x3\"  --    LAQ/SAQ AROM 2 x 10 3\"   QSets St hip 3 way 2 x 10 St hip 3 way 2 x 10 St 3 way B 2 x 10  20x5\" 10x 2, 5\" standing -  10x10\" St hip 2 ways abd, SLR 20x    st hip 3  flex, abd, ext  20x   ASLRS    4 x 5 reps -->  30 x 30x 20x A  10x 1#  d/c to HEP         Heel Slides A 20x  A 20x  --> -- AROM 30x  AROM 2 x 20  A slide board 20x  -    AA  20x   Clam shell   2 x 10 ea --> 2x10 2x10 3x10  20x5\"  -    20x5\"   Hip abd L S/L  Hip abd SLR, clam shell in L S/L- mod A Of PT  2 x 10 ea  AA 2 x 10 ea --> 2x10 fire hydrant, Hip abd straight knee cues for form 2x10 fire hydrant , hip abd straight knee 3 x 10 abd and fire hydrant  5\" x 20      20x5\"   Reviewed and issued HEP Pt ed on HEP, POC                     Neuro Re-Ed                      Mini squats    2 x 15  2 x 15  2 x 15 cues for form  --     -  15x       Lunges with TA             Step ups   4\" 2 x 10 RLE BHR A --         Step taps 4\" Alt 1 m HR A - Alt 1 m 6\" x 2 BHR A --   Weight shifting  as cee onto RLE in // 3m  step taps 4\" 2m HR A W/S F/B WBAT 2m     Weight shifting  F/B, S to S //bars  30x ea 30x ea WBAT RLE//TR 20x --    Amb with SLC 100ft. X 2  -   Ther-Act              STS    10x hi low modifed height no UE A  --    -         Amb/gait tr 4 laps fwd and 2 laps side step in //bars with UE  A 4 laps fwd only f! Amb with SLC 100ft. X 2 cues longer stride upright posture       4 laps posture cues, stride length WBAT RLE                 Modalities                      CP R hip CP LB R hip post tx x 10 min HP LB R hip post tx x 10 m CP LB and R hip post tx 10 m HP L/S " "pre tx 10m CP L/S, R glut/hip  10m post tx CP R hip L/S post tx 10 m -- --  HP x 10 m LB and R hip mild                                           Access Code: [email or web address]  URL: [email or web address]  Date: 03/13/2024  Prepared by: Dorie    Exercises  - Long Sitting Quad Set  - 1 x daily - 7 x weekly - 2 sets - 10 reps - 3\" hold  - Supine Gluteal Sets  - 1 x daily - 7 x weekly - 2 sets - 10 reps - 3\" hold  - Seated Gluteal Sets  - 1 x daily - 7 x weekly - 2 sets - 10 reps - 3\" hold  - Seated Long Arc Quad  - 1 x daily - 7 x weekly - 2 sets - 10 reps - 3\" hold  - Seated March  - 1 x daily - 7 x weekly - 2 sets - 10 reps  - Supine Ankle Pumps  - 1 x daily - 7 x weekly - 2 sets - 10 reps                     "

## 2024-05-07 ENCOUNTER — OFFICE VISIT (OUTPATIENT)
Dept: PHYSICAL THERAPY | Facility: CLINIC | Age: 74
End: 2024-05-07
Payer: MEDICARE

## 2024-05-07 DIAGNOSIS — M25.551 PAIN IN RIGHT HIP: Primary | ICD-10-CM

## 2024-05-07 DIAGNOSIS — M70.61 TROCHANTERIC BURSITIS, RIGHT HIP: ICD-10-CM

## 2024-05-07 DIAGNOSIS — Z98.890 STATUS POST HIP SURGERY: ICD-10-CM

## 2024-05-07 PROCEDURE — 97112 NEUROMUSCULAR REEDUCATION: CPT | Performed by: PHYSICAL THERAPIST

## 2024-05-07 PROCEDURE — 97110 THERAPEUTIC EXERCISES: CPT | Performed by: PHYSICAL THERAPIST

## 2024-05-07 NOTE — PROGRESS NOTES
Daily Note     Today's date: 2024  Patient name: RACHNA Pulido  : 1950  MRN: [id number]  Referring provider: Lexi Green MD  Dx:   Encounter Diagnosis     ICD-10-CM    1. Pain in right hip  M25.551       2. Trochanteric bursitis, right hip  M70.61       3. Status post hip surgery  Z98.890                      Subjective: Pt notes she did see Dr. Pelletier's PA-C yesterday and was ordered to continue with PT care. She did receive a cortisone injection in L hip for bursitis.       Objective: Pt notes no pain in R hip pre tx today.  She did progress with strength of R hip to GTB resisted clam shells H/L, PREs for modified fire hydrant in L S/L. And progression with Bridging B  LE s and R LE WBAT.  She noted no pain sx in R hip with tasks. Cueing for correct form to avoid substitution of trunk rotation and A required to complete R hip abd w/extended knee and clam shell secondary to fatigue of LE.  Resumed standing weight shifting and gait activity with visual and verbal cues for form of upright posture, activation of core and R hip mm for tasks. Pt trialed side stepping in parallel bars but fatigue of RLE limited safe completion of task as she was unable to keep good form and avoid stress on lumbar spine. Pt continues to require use of SLC or HR A for amb. Secondary to dec strength and endurance of R hip abductor m     Assessment: Tolerated treatment well. Patient demonstrated fatigue post treatment and would benefit from continued PT to achieve goals of care.       Plan: Continue per plan of care.      Precautions: DOS: 2/15/24 (R hip trochanteric bursectomy with gluteus medius repair and IT band transfer), Lumbar surgery, R THR, HTN      Daily Treatment Diary:      Initial Evaluation Date: 24  Compliance 4/16 4/18 4/23 4/25 4/30 5/2 5/7  4/4  4/10  4/11   Visit Number 11 12  13  14 15 16  7  8  9  10   Re-[facility]  -                    Foto Captured -           Y    Y             "4/25 4/30  5/2 5/7  4/4  4/10  4/11   Manual                      R Hip PROM 10m --> -->  5m  5 m 8m  8m  -    10m                        STM R scar, hip / lumbar soft tissue 10m  10m  -->  10m R T/L PVM, QL  10m  R T/L PVM QL  - -  -    10m   Ther-Ex                      NuStep Upright bike 10 m L 1  1m sore  5 m seat 7, L1  --  --    Upright bike seat 7 1.3 mi, L1    upright bike L1 seat 7 5 m   Ankle Pumps Hip isometrics 20x 5\" ea flex, abd, add, ext Hip isometrics 20x 5\" ea flex, abd, add, ext -->  --  --  hip abd sumbax iso H/L and neutral hip 2 x 0, 3\" Hip abd submax iso H/L and neutral hip 20x 3\"  --    hip abd iso hip flexed and neutral 2x10, 3\"   Seated March H/L march 2 x 15 H/L march 2 x 15 Standing march 2 x 10  H/L AA 2 x 10  H/L AA 20x   H/L A 20x 2 H/L hip abd GTB 2 x 10, 5\"  --       LAQ/SAQ 2 x 15, 3\" 2x10 2# 3\" -->   SAQ/LAQ 20x  SAQ 1# 20x3\", LAQ 1#  20x3\"  SAQ   2 # 30x/3\"/LAQ 2# 30x3\" SAQ  3# 20x3\"//LAQ 3# 20x3\"  --    LAQ/SAQ AROM 2 x 10 3\"   QSets St hip 3 way 2 x 10 St hip 3 way 2 x 10 St 3 way B 2 x 10  20x5\" 10x 2, 5\" standing - B Bridge cues TA 2 x 10, WBAT RLE bridge 2 x 5 (75%)     st hip 3  flex, abd, ext  20x   ASLRS    4 x 5 reps -->  30 x 30x 20x A  10x 1# 2 x 10 A supine         Heel Slides A 20x  A 20x  --> -- AROM 30x  AROM 2 x 20 -  -    AA  20x   Clam shell   2 x 10 ea --> 2x10 2x10 3x10  20x5\"  -    20x5\"   Hip abd L S/L  Hip abd SLR, clam shell in L S/L- mod A Of PT  2 x 10 ea  AA 2 x 10 ea --> 2x10 fire hydrant, Hip abd straight knee cues for form 2x10 fire hydrant , hip abd straight knee 3 x 10 abd and fire hydrant 2 x 10 , 1# fire ydrant; hip abd 2 x 10 AA fatigue      20x5\"   Reviewed and issued HEP Pt ed on HEP, POC                     Neuro Re-Ed                      Mini squats    2 x 15  2 x 15  2 x 15 cues for form  --     -        Lunges with TA             Step ups   4\" 2 x 10 RLE BHR A --         Step taps 4\" Alt 1 m HR A - Alt 1 m 6\" x 2 BHR A --         Weight " "shifting  F/B, S to S //bars  30x ea 30x ea WBAT RLE//TR 20x --   Weight shifting  as cee onto RLE , 5\" x3 m each front and lat HR A   -   Ther-Act              STS    10x hi low modifed height no UE A  --             Amb/gait tr 4 laps fwd and 2 laps side step in //bars with UE  A 4 laps fwd only f! Amb with SLC 100ft. X 2 cues longer stride upright posture    Parallel bars Little HR A stride length and stance time RLE. Core and hip m activ cueing 6 laps             Side step //bars f!       Modalities                      CP R hip CP LB R hip post tx x 10 min HP LB R hip post tx x 10 m CP LB and R hip post tx 10 m HP L/S pre tx 10m CP L/S, R glut/hip  10m post tx CP R hip L/S post tx 10 m CP R hip , L/S 10 m seated post tx --                                             Access Code: [email or web address]  URL: [email or web address]  Date: 03/13/2024  Prepared by: Dorie    Exercises  - Long Sitting Quad Set  - 1 x daily - 7 x weekly - 2 sets - 10 reps - 3\" hold  - Supine Gluteal Sets  - 1 x daily - 7 x weekly - 2 sets - 10 reps - 3\" hold  - Seated Gluteal Sets  - 1 x daily - 7 x weekly - 2 sets - 10 reps - 3\" hold  - Seated Long Arc Quad  - 1 x daily - 7 x weekly - 2 sets - 10 reps - 3\" hold  - Seated March  - 1 x daily - 7 x weekly - 2 sets - 10 reps  - Supine Ankle Pumps  - 1 x daily - 7 x weekly - 2 sets - 10 reps                       "

## 2024-05-09 ENCOUNTER — OFFICE VISIT (OUTPATIENT)
Dept: PHYSICAL THERAPY | Facility: CLINIC | Age: 74
End: 2024-05-09
Payer: MEDICARE

## 2024-05-09 DIAGNOSIS — M25.551 PAIN IN RIGHT HIP: Primary | ICD-10-CM

## 2024-05-09 DIAGNOSIS — M70.61 TROCHANTERIC BURSITIS, RIGHT HIP: ICD-10-CM

## 2024-05-09 DIAGNOSIS — Z98.890 STATUS POST HIP SURGERY: ICD-10-CM

## 2024-05-09 PROCEDURE — 97112 NEUROMUSCULAR REEDUCATION: CPT

## 2024-05-09 PROCEDURE — 97140 MANUAL THERAPY 1/> REGIONS: CPT

## 2024-05-09 PROCEDURE — 97110 THERAPEUTIC EXERCISES: CPT

## 2024-05-09 NOTE — PROGRESS NOTES
"Daily Note     Today's date: 2024  Patient name: RACHNA Pulido  : 1950  MRN: [id number]  Referring provider: Lexi Green MD  Dx:   Encounter Diagnosis     ICD-10-CM    1. Pain in right hip  M25.551       2. Trochanteric bursitis, right hip  M70.61       3. Status post hip surgery  Z98.890           Start Time: 1300  Stop Time: 1400  Total time in clinic (min): 60 minutes    Subjective: Patient reports having some back pain today.  Patient states that the hip is feeling better.      Objective: See treatment diary below      Assessment: Tolerated treatment well.   Patient participated in skilled PT session focused on strengthening, stretching, and ROM.  Patient able to complete exercise program with no issues.  Patient experience no increase in back during the session.  Patient demonstrates improve AROM with exercises performed in beginning of session.  Patient would continue to benefit from skilled PT interventions to address strengthening, stretching, and ROM.  Patient demonstrated fatigue post treatment and exhibited good technique with therapeutic exercises      Plan: Continue per plan of care.      Precautions: DOS: 2/15/24 (R hip trochanteric bursectomy with gluteus medius repair and IT band transfer), Lumbar surgery, R THR, HTN      Daily Treatment Diary:      Initial Evaluation Date: 24  Compliance 4/16 4/18 4/23 4/25 4/30 5/2 5/7 5/9  4/10  4/11   Visit Number 11 12  13  14 15 16  7 18  9  10   -[facility]   Foto Captured -               Y            52 57 5/9  4/10  4/11   Manual                     R Hip PROM 10m --> -->  5m  5 m 8m  8m 8 min    10m                       STM R scar, hip / lumbar soft tissue 10m  10m  -->  10m R T/L PVM, QL  10m  R T/L PVM QL  - -     10m   Ther-Ex                     NuStep Upright bike 10 m L 1  1m sore  5 m seat 7, L1  --  --        upright bike L1 seat 7 5 m   Ankle Pumps Hip isometrics 20x 5\" ea flex, " "abd, add, ext Hip isometrics 20x 5\" ea flex, abd, add, ext -->  --  --  hip abd sumbax iso H/L and neutral hip 2 x 0, 3\" Hip abd submax iso H/L and neutral hip 20x 3\" Hip abd suvmax iso H/L and neutral hip 3\" 20x    hip abd iso hip flexed and neutral 2x10, 3\"   Seated March H/L march 2 x 15 H/L march 2 x 15 Standing march 2 x 10  H/L AA 2 x 10  H/L AA 20x   H/L A 20x 2 H/L hip abd GTB 2 x 10, 5\" H/L hip Abd GTB 5\" 2x10       LAQ/SAQ 2 x 15, 3\" 2x10 2# 3\" -->   SAQ/LAQ 20x  SAQ 1# 20x3\", LAQ 1#  20x3\"  SAQ   2 # 30x/3\"/LAQ 2# 30x3\" SAQ  3# 20x3\"//LAQ 3# 20x3\" SAQ 3#  3\" 20x  LAQ 3# 3\" 20x    LAQ/SAQ AROM 2 x 10 3\"   QSets St hip 3 way 2 x 10 St hip 3 way 2 x 10 St 3 way B 2 x 10  20x5\" 10x 2, 5\" standing - B Bridge cues TA 2 x 10, WBAT RLE bridge 2 x 5 (75%) B/L bridge TA 2x10    WBAT RLE bridge 2x15    st hip 3  flex, abd, ext  20x   ASLRS    4 x 5 reps -->  30 x 30x 20x A  10x 1# 2 x 10 A supine Supine 2x10 AROM       Heel Slides A 20x  A 20x  --> -- AROM 30x  AROM 2 x 20 -     AA  20x   Clam shell   2 x 10 ea --> 2x10 2x10 3x10  20x5\" 5\" 20x AAROM 1st 10  AROM 2nd 10    20x5\"   Hip abd L S/L  Hip abd SLR, clam shell in L S/L- mod A Of PT  2 x 10 ea  AA 2 x 10 ea --> 2x10 fire hydrant, Hip abd straight knee cues for form 2x10 fire hydrant , hip abd straight knee 3 x 10 abd and fire hydrant 2 x 10 , 1# fire ydrant; hip abd 2 x 10 AA fatigue S/L Fire hydrant 1# R 2x10    S/L hip abd No wt 2x10 2x10         20x5\"   Reviewed and issued HEP Pt ed on HEP, POC                    Neuro Re-Ed                     Mini squats    2 x 15  2 x 15  2 x 15 cues for form  --     -        Lunges with TA             Step ups   4\" 2 x 10 RLE BHR A --         Step taps 4\" Alt 1 m HR A - Alt 1 m 6\" x 2 BHR A --         Weight shifting  F/B, S to S //bars  30x ea 30x ea WBAT RLE//TR 20x --   Weight shifting  as cee onto RLE , 5\" x3 m each front and lat HR A Weight shifting as cee onto RLE 5\" x 3 min each front and lat HR A  -   Ther-Act  " "            STS    10x hi low modifed height no UE A  --            Amb/gait tr 4 laps fwd and 2 laps side step in //bars with UE  A 4 laps fwd only f! Amb with SLC 100ft. X 2 cues longer stride upright posture    Parallel bars Little HR A stride length and stance time RLE. Core and hip m activ cueing 6 laps 6 laps //bars HR A stride length and stance time            Side step //bars f!       Modalities                     CP R hip CP LB R hip post tx x 10 min HP LB R hip post tx x 10 m CP LB and R hip post tx 10 m HP L/S pre tx 10m CP L/S, R glut/hip  10m post tx CP R hip L/S post tx 10 m CP R hip , L/S 10 m seated post tx MHP R hip, L/S x 10 min seated                                            Access Code: [email or web address]  URL: [email or web address]  Date: 03/13/2024  Prepared by: Dorie    Exercises  - Long Sitting Quad Set  - 1 x daily - 7 x weekly - 2 sets - 10 reps - 3\" hold  - Supine Gluteal Sets  - 1 x daily - 7 x weekly - 2 sets - 10 reps - 3\" hold  - Seated Gluteal Sets  - 1 x daily - 7 x weekly - 2 sets - 10 reps - 3\" hold  - Seated Long Arc Quad  - 1 x daily - 7 x weekly - 2 sets - 10 reps - 3\" hold  - Seated March  - 1 x daily - 7 x weekly - 2 sets - 10 reps  - Supine Ankle Pumps  - 1 x daily - 7 x weekly - 2 sets - 10 reps                         "

## 2024-05-14 ENCOUNTER — APPOINTMENT (OUTPATIENT)
Dept: PHYSICAL THERAPY | Facility: CLINIC | Age: 74
End: 2024-05-14
Payer: MEDICARE

## 2024-05-16 ENCOUNTER — OFFICE VISIT (OUTPATIENT)
Dept: PHYSICAL THERAPY | Facility: CLINIC | Age: 74
End: 2024-05-16
Payer: MEDICARE

## 2024-05-16 DIAGNOSIS — Z98.890 STATUS POST HIP SURGERY: ICD-10-CM

## 2024-05-16 DIAGNOSIS — M70.61 TROCHANTERIC BURSITIS, RIGHT HIP: ICD-10-CM

## 2024-05-16 DIAGNOSIS — M25.551 PAIN IN RIGHT HIP: Primary | ICD-10-CM

## 2024-05-16 PROCEDURE — 97140 MANUAL THERAPY 1/> REGIONS: CPT

## 2024-05-16 PROCEDURE — 97110 THERAPEUTIC EXERCISES: CPT

## 2024-05-16 PROCEDURE — 97112 NEUROMUSCULAR REEDUCATION: CPT

## 2024-05-16 NOTE — PROGRESS NOTES
Daily Note     Today's date: 2024  Patient name: RACHNA Pulido  : 1950  MRN: [id number]  Referring provider: Lexi Green MD  Dx:   Encounter Diagnosis     ICD-10-CM    1. Pain in right hip  M25.551       2. Trochanteric bursitis, right hip  M70.61       3. Status post hip surgery  Z98.890           Start Time: 1030  Stop Time: 1130  Total time in clinic (min): 60 minutes    Subjective: Patient reports that her back is a little sore.  Patient states that on Tuesday she had a back ablation.  Patient reports that her hip is feeling better.     Objective: See treatment diary below      Assessment: Tolerated treatment well.   Patient participated in skilled PT session focused on strengthening, stretching, and ROM. Patient able to complete exercise program with no issue.  Patient demonstrates improved strength this session by not needing assistance from therapist.  Patient continues to demonstrate R hip lag during side stepping and ambulation.   Patient would continue to benefit from skilled PT interventions to address strengthening, stretching, and ROM. Patient demonstrated fatigue post treatment and exhibited good technique with therapeutic exercises      Plan: Continue per plan of care.      Precautions: DOS: 2/15/24 (R hip trochanteric bursectomy with gluteus medius repair and IT band transfer), Lumbar surgery, R THR, HTN      Daily Treatment Diary:      Initial Evaluation Date: 24  Compliance    Visit Number 11 12  13  14 15 16  7 18 19  10   [facility]              [facility]            5 [facility]    Manual                     R Hip PROM 10m --> -->  5m  5 m 8m  8m 8 min  8 min  10m                       STM R scar, hip / lumbar soft tissue 10m  10m  -->  10m R T/L PVM, QL  10m  R T/L PVM QL  - -   2 min  10m   Ther-Ex                     NuStep Upright bike 10 m L 1  1m sore  5 m  "seat 7, L1  --  --        upright bike L1 seat 7 5 m   Ankle Pumps Hip isometrics 20x 5\" ea flex, abd, add, ext Hip isometrics 20x 5\" ea flex, abd, add, ext -->  --  --  hip abd sumbax iso H/L and neutral hip 2 x 0, 3\" Hip abd submax iso H/L and neutral hip 20x 3\" Hip abd suvmax iso H/L and neutral hip 3\" 20x  Hip abd suvmax iso H/L and neutral hip 3\" 20x  hip abd iso hip flexed and neutral 2x10, 3\"   Seated March H/L march 2 x 15 H/L march 2 x 15 Standing march 2 x 10  H/L AA 2 x 10  H/L AA 20x   H/L A 20x 2 H/L hip abd GTB 2 x 10, 5\" H/L hip Abd GTB 5\" 2x10  H/L hip abd GTB 5\" 2x10     LAQ/SAQ 2 x 15, 3\" 2x10 2# 3\" -->   SAQ/LAQ 20x  SAQ 1# 20x3\", LAQ 1#  20x3\"  SAQ   2 # 30x/3\"/LAQ 2# 30x3\" SAQ  3# 20x3\"//LAQ 3# 20x3\" SAQ 3#  3\" 20x  LAQ 3# 3\" 20x  SAQ 3# B/L 3\" 20x      LAQ/SAQ AROM 2 x 10 3\"   QSets St hip 3 way 2 x 10 St hip 3 way 2 x 10 St 3 way B 2 x 10  20x5\" 10x 2, 5\" standing - B Bridge cues TA 2 x 10, WBAT RLE bridge 2 x 5 (75%) B/L bridge TA 2x10    WBAT RLE bridge 2x15  B/L bridge w/TA 2x10    WBAT RLE bridge 2x15      st hip 3  flex, abd, ext  20x   ASLRS    4 x 5 reps -->  30 x 30x 20x A  10x 1# 2 x 10 A supine Supine 2x10 AROM  supine 2x10 AROM     Heel Slides A 20x  A 20x  --> -- AROM 30x  AROM 2 x 20 -     AA  20x   Clam shell   2 x 10 ea --> 2x10 2x10 3x10  20x5\" 5\" 20x AAROM 1st 10  AROM 2nd 10  AROM 5\" 20x  20x5\"   Hip abd L S/L  Hip abd SLR, clam shell in L S/L- mod A Of PT  2 x 10 ea  AA 2 x 10 ea --> 2x10 fire hydrant, Hip abd straight knee cues for form 2x10 fire hydrant , hip abd straight knee 3 x 10 abd and fire hydrant 2 x 10 , 1# fire ydrant; hip abd 2 x 10 AA fatigue S/L Fire hydrant 1# R 2x10    S/L hip abd No wt 2x10 2x10       S/L fire hydrant 1# R  2x10    S/L hip abd No wts 2x10   20x5\"   Reviewed and issued HEP Pt ed on HEP, POC                    Neuro Re-Ed                     Mini squats    2 x 15  2 x 15  2 x 15 cues for form  --     -        Lunges with TA             Step " "ups   4\" 2 x 10 RLE BHR A --         Step taps 4\" Alt 1 m HR A - Alt 1 m 6\" x 2 BHR A --         Weight shifting  F/B, S to S //bars  30x ea 30x ea WBAT RLE//TR 20x --   Weight shifting  as cee onto RLE , 5\" x3 m each front and lat HR A Weight shifting as cee onto RLE 5\" x 3 min each front and lat HR A //bars SS x 4 laps -   Ther-Act              STS    10x hi low modifed height no UE A  --            Amb/gait tr 4 laps fwd and 2 laps side step in //bars with UE  A 4 laps fwd only f! Amb with SLC 100ft. X 2 cues longer stride upright posture    Parallel bars Little HR A stride length and stance time RLE. Core and hip m activ cueing 6 laps 6 laps //bars HR A stride length and stance time  6 laps //bars HR A stride length and stance time           Side step //bars f!       Modalities                     CP R hip CP LB R hip post tx x 10 min HP LB R hip post tx x 10 m CP LB and R hip post tx 10 m HP L/S pre tx 10m CP L/S, R glut/hip  10m post tx CP R hip L/S post tx 10 m CP R hip , L/S 10 m seated post tx MHP R hip, L/S x 10 min seated MHP R hip L/S x 10 min  in S/L                                           Access Code: [email or web address]  URL: [email or web address]  Date: 03/13/2024  Prepared by: Dorie    Exercises  - Long Sitting Quad Set  - 1 x daily - 7 x weekly - 2 sets - 10 reps - 3\" hold  - Supine Gluteal Sets  - 1 x daily - 7 x weekly - 2 sets - 10 reps - 3\" hold  - Seated Gluteal Sets  - 1 x daily - 7 x weekly - 2 sets - 10 reps - 3\" hold  - Seated Long Arc Quad  - 1 x daily - 7 x weekly - 2 sets - 10 reps - 3\" hold  - Seated March  - 1 x daily - 7 x weekly - 2 sets - 10 reps  - Supine Ankle Pumps  - 1 x daily - 7 x weekly - 2 sets - 10 reps                           "

## 2024-05-17 ENCOUNTER — OFFICE VISIT (OUTPATIENT)
Dept: PHYSICAL THERAPY | Facility: CLINIC | Age: 74
End: 2024-05-17
Payer: MEDICARE

## 2024-05-17 DIAGNOSIS — Z98.890 STATUS POST HIP SURGERY: ICD-10-CM

## 2024-05-17 DIAGNOSIS — M70.61 TROCHANTERIC BURSITIS, RIGHT HIP: ICD-10-CM

## 2024-05-17 DIAGNOSIS — M25.551 PAIN IN RIGHT HIP: Primary | ICD-10-CM

## 2024-05-17 PROCEDURE — 97110 THERAPEUTIC EXERCISES: CPT

## 2024-05-17 PROCEDURE — 97140 MANUAL THERAPY 1/> REGIONS: CPT

## 2024-05-17 PROCEDURE — 97112 NEUROMUSCULAR REEDUCATION: CPT

## 2024-05-17 NOTE — PROGRESS NOTES
"Daily Note     Today's date: 2024  Patient name: RACHNA Pulido  : 1950  MRN: [id number]  Referring provider: Lexi Green MD  Dx:   Encounter Diagnosis     ICD-10-CM    1. Pain in right hip  M25.551       2. Trochanteric bursitis, right hip  M70.61       3. Status post hip surgery  Z98.890           Start Time: 1015  Stop Time: 1105  Total time in clinic (min): 50 minutes    Subjective: Patient reports no new complaints.      Objective: See treatment diary below      Assessment: Tolerated treatment well.   Patient participated in skilled PT session focused on strengthening, stretching, and ROM.  Patient able to complete exercise program with no issues.  Patient demonstrates improved R hip strength with table exercises being able to perform AROM.  Patient continues to ambulate with a mild antalgic gait pattern.  Patient continues to demonstrate decrease wt bearing through RLE.  Patient would continue to benefit from skilled PT interventions to address strengthening, stretching, and ROM. Patient demonstrated fatigue post treatment and exhibited good technique with therapeutic exercises      Plan: Continue per plan of care.      Precautions: DOS: 2/15/24 (R hip trochanteric bursectomy with gluteus medius repair and IT band transfer), Lumbar surgery, R THR, HTN      Daily Treatment Diary:      Initial Evaluation Date: 24  Compliance  5 57    Visit Number 11 12  13  14 15 16  7 18 19 20   Re-Eval  -                 Foto Captured -                         [phone] 59     Manual                    R Hip PROM 10m --> -->  5m  5 m 8m  8m 8 min  8 min 8 min                      STM R scar, hip / lumbar soft tissue 10m  10m  -->  10m R T/L PVM, QL  10m  R T/L PVM QL  - -   2 min 2 min   Ther-Ex                    NuStep Upright bike 10 m L 1  1m sore  5 m seat 7, L1  --  --          Ankle Pumps Hip isometrics 20x 5\" ea flex, " "abd, add, ext Hip isometrics 20x 5\" ea flex, abd, add, ext -->  --  --  hip abd sumbax iso H/L and neutral hip 2 x 0, 3\" Hip abd submax iso H/L and neutral hip 20x 3\" Hip abd suvmax iso H/L and neutral hip 3\" 20x  Hip abd suvmax iso H/L and neutral hip 3\" 20x Hip abd suvmax iso H/L and neutral hip 3\" 20x   Seated March H/L march 2 x 15 H/L march 2 x 15 Standing march 2 x 10  H/L AA 2 x 10  H/L AA 20x   H/L A 20x 2 H/L hip abd GTB 2 x 10, 5\" H/L hip Abd GTB 5\" 2x10  H/L hip abd GTB 5\" 2x10 H/L hip abd GTB 5\" 2x10   LAQ/SAQ 2 x 15, 3\" 2x10 2# 3\" -->   SAQ/LAQ 20x  SAQ 1# 20x3\", LAQ 1#  20x3\"  SAQ   2 # 30x/3\"/LAQ 2# 30x3\" SAQ  3# 20x3\"//LAQ 3# 20x3\" SAQ 3#  3\" 20x  LAQ 3# 3\" 20x  SAQ 3# B/L 3\" 20x     SAQ 3# B/L 3\" 20x    LAQ 3# 3\" 20x   QSets St hip 3 way 2 x 10 St hip 3 way 2 x 10 St 3 way B 2 x 10  20x5\" 10x 2, 5\" standing - B Bridge cues TA 2 x 10, WBAT RLE bridge 2 x 5 (75%) B/L bridge TA 2x10    WBAT RLE bridge 2x15  B/L bridge w/TA 2x10    WBAT RLE bridge 2x15     B/L bridge w/TA 2x10    WBAT RLE bridge 2x15   ASLRS    4 x 5 reps -->  30 x 30x 20x A  10x 1# 2 x 10 A supine Supine 2x10 AROM  supine 2x10 AROM Supine AROM 2x10 ea   Heel Slides A 20x  A 20x  --> -- AROM 30x  AROM 2 x 20 -       Clam shell   2 x 10 ea --> 2x10 2x10 3x10  20x5\" 5\" 20x AAROM 1st 10  AROM 2nd 10  AROM 5\" 20x AROM 5\" 20x   Hip abd L S/L  Hip abd SLR, clam shell in L S/L- mod A Of PT  2 x 10 ea  AA 2 x 10 ea --> 2x10 fire hydrant, Hip abd straight knee cues for form 2x10 fire hydrant , hip abd straight knee 3 x 10 abd and fire hydrant 2 x 10 , 1# fire ydrant; hip abd 2 x 10 AA fatigue S/L Fire hydrant 1# R 2x10    S/L hip abd No wt 2x10 2x10       S/L fire hydrant 1# R  2x10    S/L hip abd No wts 2x10  S/L fire hydrant 1# R  2x10    S/L hip abd No wts 2x10   Reviewed and issued HEP Pt ed on HEP, POC                   Neuro Re-Ed                    Mini squats    2 x 15  2 x 15  2 x 15 cues for form  --     -       Lunges with TA          " "   Step ups   4\" 2 x 10 RLE BHR A --         Step taps 4\" Alt 1 m HR A - Alt 1 m 6\" x 2 BHR A --         Weight shifting  F/B, S to S //bars  30x ea 30x ea WBAT RLE//TR 20x --   Weight shifting  as cee onto RLE , 5\" x3 m each front and lat HR A Weight shifting as cee onto RLE 5\" x 3 min each front and lat HR A //bars SS x 4 laps //bars SS x 4 laps   Ther-Act              STS    10x hi low modifed height no UE A  --           Amb/gait tr 4 laps fwd and 2 laps side step in //bars with UE  A 4 laps fwd only f! Amb with SLC 100ft. X 2 cues longer stride upright posture    Parallel bars Little HR A stride length and stance time RLE. Core and hip m activ cueing 6 laps 6 laps //bars HR A stride length and stance time  6 laps //bars HR A stride length and stance time Amb through out clinic without SPC.           Side step //bars f!       Modalities                    CP R hip CP LB R hip post tx x 10 min HP LB R hip post tx x 10 m CP LB and R hip post tx 10 m HP L/S pre tx 10m CP L/S, R glut/hip  10m post tx CP R hip L/S post tx 10 m CP R hip , L/S 10 m seated post tx MHP R hip, L/S x 10 min seated MHP R hip L/S x 10 min  in S/L MHP R hip L/S x 10 min  in S/L                                         Access Code: [email or web address]  URL: [email or web address]  Date: 03/13/2024  Prepared by: Dorie    Exercises  - Long Sitting Quad Set  - 1 x daily - 7 x weekly - 2 sets - 10 reps - 3\" hold  - Supine Gluteal Sets  - 1 x daily - 7 x weekly - 2 sets - 10 reps - 3\" hold  - Seated Gluteal Sets  - 1 x daily - 7 x weekly - 2 sets - 10 reps - 3\" hold  - Seated Long Arc Quad  - 1 x daily - 7 x weekly - 2 sets - 10 reps - 3\" hold  - Seated March  - 1 x daily - 7 x weekly - 2 sets - 10 reps  - Supine Ankle Pumps  - 1 x daily - 7 x weekly - 2 sets - 10 reps                             "

## 2024-05-21 ENCOUNTER — APPOINTMENT (OUTPATIENT)
Dept: PHYSICAL THERAPY | Facility: CLINIC | Age: 74
End: 2024-05-21
Payer: MEDICARE

## 2024-05-23 ENCOUNTER — APPOINTMENT (OUTPATIENT)
Dept: PHYSICAL THERAPY | Facility: CLINIC | Age: 74
End: 2024-05-23
Payer: MEDICARE

## 2024-05-28 ENCOUNTER — APPOINTMENT (OUTPATIENT)
Dept: PHYSICAL THERAPY | Facility: CLINIC | Age: 74
End: 2024-05-28
Payer: MEDICARE

## 2024-05-29 ENCOUNTER — EVALUATION (OUTPATIENT)
Dept: PHYSICAL THERAPY | Facility: CLINIC | Age: 74
End: 2024-05-29
Payer: MEDICARE

## 2024-05-29 DIAGNOSIS — M25.551 PAIN IN RIGHT HIP: Primary | ICD-10-CM

## 2024-05-29 DIAGNOSIS — Z98.890 STATUS POST HIP SURGERY: ICD-10-CM

## 2024-05-29 DIAGNOSIS — M70.61 TROCHANTERIC BURSITIS, RIGHT HIP: ICD-10-CM

## 2024-05-29 PROCEDURE — 97110 THERAPEUTIC EXERCISES: CPT | Performed by: PHYSICAL THERAPIST

## 2024-05-29 PROCEDURE — 97164 PT RE-EVAL EST PLAN CARE: CPT | Performed by: PHYSICAL THERAPIST

## 2024-05-29 PROCEDURE — 97112 NEUROMUSCULAR REEDUCATION: CPT | Performed by: PHYSICAL THERAPIST

## 2024-05-29 NOTE — LETTER
May 31, 2024    Armani Pelletier MD  [address] [place], [place] [address]    Patient: RACHNA Puliod   YOB: 1950   Date of Visit: 2024     Encounter Diagnosis     ICD-10-CM    1. Pain in right hip  M25.551       2. Trochanteric bursitis, right hip  M70.61       3. Status post hip surgery  Z98.890           Dear Dr. Pelletier:    Thank you for your recent referral of RACHNA Pulido. Please review the attached evaluation summary from A Porsche's recent visit.     Please verify that you agree with the plan of care by signing the attached order.     If you have any questions or concerns, please do not hesitate to call.     I sincerely appreciate the opportunity to share in the care of one of your patients and hope to have another opportunity to work with you in the near future.       Sincerely,    Charito Nunn, PT      Referring Provider:      I certify that I have read the below Plan of Care and certify the need for these services furnished under this plan of treatment while under my care.                    Armani Pelletier MD  [address] [place], [place] [address]  Via Fax: [phone]          PT Re-Evaluation     Today's date: 2024  Patient name: RACHNA Pulido  : 1950  MRN: [id number]  Referring provider: Lexi Green MD  Dx:   Encounter Diagnosis     ICD-10-CM    1. Pain in right hip  M25.551       2. Trochanteric bursitis, right hip  M70.61       3. Status post hip surgery  Z98.890                            Assessment  Impairments: abnormal gait, abnormal or restricted ROM, impaired physical strength, lacks appropriate home exercise program, pain with function, weight-bearing intolerance and poor posture   Other impairment: dec flexibiity, dec SLB RLE 6 seconds without UE support with sway and sense of weakness in R LE reported. SLB LLE 60 seconds with mild sway  Symptom irritability: moderate    Assessment details: The patient is a 75 y/o  female who presents to PT s/p R hip surgery (R hip trochanteric bursectomy with gluteus medius repair and IT band transfer) on 2/15/24 by Dr. Pelletier.  She has complaints of intermittent pulling/pain in her hip.  She demonstrates deficits with decreased ROM and strength, decreased flexibility, antalgic gait with use of brace and AD, decreased balance and proprioception and edema.  These deficits lead to difficulty with stair negotiation, gait dysfunction and pain with completing her ADLs and tasks at home.  She is I with most tasks but her  has been assisting with LE dressing.  She is also using a shower chair when bathing.  She ambulates with use of brace locked 0-90* and RW for household and community distances.  Slow bashir is noted along with decreased weight shift to RLE in stance phase.  She has difficulty with stair negotiation - has been using the walker and going up and down the steps with non-reciprocal gait pattern.  She also has difficulty sleeping, has been sleeping on the recliner or sofa.  She is not currently driving.  Secondary to above deficits she is limited with her overall mobility and function.  The patient would benefit from continued PT to address deficits and improve function.  Tx to include ROM, stretching, strengthening, modalities, HEP, pt education, postural ed, lifting/body mechanics, neuro re-ed, balance/proprioception Te, MT and equipment.      Updtae 4/11/24- Pt has been treated  10  times in Physical Therapy for care of R hip s/p gluteus minimus reapir and ITB tendon transfer compelted 2/15/24. Pt notes increase in functional WB tolerances and sleep in bed. Pt has progressed with WBAT RLE to cane use but is finding increase in chronic R Knee pain and R LBP with activity increase. She was educated in use of walker to assist with LBP and R Knee pain prn. Pt nnotes good tolerance to WB of R hip with out pain but does note intermittent soreness with function and lying onto R  side for sleep.  She presents with tenderness to palpation of scar, R gluteal soft tissue, R  thoraco/lumbar  PVMs. She has mild swelling generalized in R knee with ROM 5-125 degrees. R hip ROM  and strength has progressed  as anticipated following surgery. Pt has progressed with WB AT RLE. She does require UE A for SLB On RLE currently and denies any hip pain sx. With task. Pt amb   with SLC  100ft.  Distance with non antalgic gait with decrease trunk rotation and RUE arm swing.  Pt will benefit from continued skilled care to achieve PT goals of care.         Update 5/29/24- Pt has been treated    times in PT for care of R hip s/p gluteus minimus repair with ITB tendon transfer with hx of R THR, spinal surgery and recent rhizotomy completed. Pt is amb. With /with out AD of SLC distance of      .    Stength of R hip   abduction  flexion and extension     /5. SLB LLE        seconds.        Understanding of Dx/Px/POC: good     Prognosis: good    Goals  STGs:  1.  Initiate and complete HEP with verbal cues. progressing  2.  Improve R hip ROM by 5-10 degrees in 4 weeks. achieved  3.  Improve R LE strength by 1/2 grade in 4 weeks. achieved  4.  Decrease R hip pain by 25% in 4 weeks achieved.  LTGs: progressing  1.  Patient to be I with HEP in 12 weeks.  2.  Improve R Hip ROM to WNL t/o in 12 weeks to improve function.  3.  Improve R LE strength to > or = to 4 to 4+/5 t/o in 12 weeks to improve function.  4.  Decrease R hip pain to < or = to 1-2/10 with activity in 12 weeks to improve function.   5.  Patient to ambulate with normalized gait pattern without AD in 12 weeks.  6.  Stair negotiation is improved to PLOF in 12 weeks.  7.  Recreational performance is improved to PLOF in 12 weeks.  8.  ADL performance is improved to PLOF in 12 weeks.            Plan  Patient would benefit from: skilled physical therapy  Planned modality interventions: cryotherapy and unattended electrical stimulation    Planned therapy  interventions: abdominal trunk stabilization, gait training, functional ROM exercises, stretching, strengthening, neuromuscular re-education, manual therapy, home exercise program, therapeutic exercise, postural training, patient education, self care, balance and balance/weight bearing training    Frequency: 2x week  Duration in weeks: 6  Plan of Care beginning date: 5/16/2024  Plan of Care expiration date: 6/27/2024  Treatment plan discussed with: patient  Plan details:          Subjective Evaluation    History of Present Illness  Mechanism of injury: The was previously seen at this clinic for PT treatment from 10/24/23 to 1/18/24 for her R hip.  After she had been discharged she had seen the doctor and had surgery scheduled.  She had then been scheduled for surgery by Dr. Pelletier.  Surgery was 2/15/24, same day surgery at the surgical center in [place].  She had gone back to see the doctor for one follow up appointment, had her staples removed, per patient the doctor is happy with how she is doing.  She will be going back to see the doctor on 3/25 for her follow up appointment.      From EMR review of PT eval from 10/24/23:  Chronc pain in R lateral hip since about one year duration. She has had PT in the past with improvements- most recently in 3/23 at our facility.  In the past, she has tried heel lifts,  AD of cane, salon pas, arnica gel, tylenol, mobic or celebrex, CP, injections in Each Hip  and lumbar spine, lumbar rhizotomies. Most recent injections into R hip were completed 1/23. Most recent rhizotomy  8/15/23 with some relief reported. Lumbar fusion L4-5 2022.   Pt was recently to Dr. Pelletier for assessment of R gluteus minimus tear and open repair was recommended she reports.  Pt notes she is seeking another opinion from [facility].   Pt notes regression in actvity tolerances and pain increases since June 2023 with out injury or known CADEN, but did travel for family vacation and had increase in pain  sx.  She did note increase in sx of pain again with travel in September 2023 but no injury or incident occurring.   Was doing walking program at mall 30-35 minutes with SLC in winter 2023 when she felt her best and now reports inability to tolerate 30-35 minutes secondary to her pain.    Pt medications, PMH and allergies reviewed in chart and with  patient today.     The patient states that she has intermittent pain/soreness in her hip.  Pain with moving certain directions, also feels a pull in her hip.  Pull is noted around her incision.  She notes some swelling in her leg today, she has been using a compression stocking and ice on her foot to help with the swelling.  She denies any N&T into R LE.      Has not slept in her bed yet, has been on her recliner or sofa.   She had talked to her PA about her brace, was told to use it only for her first PT appointment.  She notes that she has not been wearing it consistently at home because at her follow up appointment the doctor told her she could stop using it.      [place] 4/11/24- Pt has been seen in tx for 10 post op visits. She is progressing well with R hip function and pain control she notes but experiencing a recent flare of chronic R knee and R LBP with progression of WB advancement. Pt notes she is using cane  at all times except for am getting OOB in am,  using walker.  Pt. Notes no R hip pain, swelling. She notes trialing lying on R side for sleep.  Pt notes if LBP persists  or increases, she will reach out to her spine specialist and may discuss radiofrequency ablation as tx option or other tx options.     Update 5/29/24- Pt notes change of PCP  to Dr. Toth as Dr. Houston retired with recent med change to d/c naproxen  to  celebrex.  Pt notes undergoing  spinal procedure  of R cluneal nerve ablation, with out relief at this time but learned may take up to 6 weeks. Denies any new medical conditions or medication, denies any new allergies. Hip is doing  "\"ok\" needs to be stronger to walk up stairs, unevens and longer distances without use of SLC.  She feels she is progressing but finds she was able to walk further without care prior to surgery, now finds feeling in R hip/knee  is going to give and fatigues  when she tries to go without cane and hopes she would be doing better by now in terms of walking without her cane.  Pt notes she is not walking for exercise or riding stationary bike much currently with recent travel out of town. Pt notes R lower back pain does limit her amb  and standing tolerances currently. She feels she can do about one half mile prior to limited by her back pain. She notes difficulty standing to complete a meal prep and clean up with out resting secondary to her back pain. She notes no distal RLE sx.   And resolve of R hip pain she had prior to surgery. She notes no pain on L lower back intermittent L hip pain sx. She is not currently using her TENS unit. She notes am is the worst time of day for her back pain sx.   Patient Goals  Patient goals for therapy: decreased pain, increased strength, increased motion, independence with ADLs/IADLs and improved balance  Patient goal: decrease pain medications to manage her pain, to stand to complete cooking, cleaning, ambulation longer and on stairs improved  Pain  Current pain ratin  At best pain ratin  At worst pain ratin  Location: R Hip pain 0/10 to 1/10 at worst,  4/10 R LB  with with standing and walking  increases to 7/10.  Quality: pressure, pulling, discomfort and sharp  Relieving factors: ice, rest and change in position  Aggravating factors: standing and walking  Progression: no change    Social Support  Steps to enter house: yes  1  Stairs in house: yes   Lives in: multiple-level home  Lives with: spouse    Employment status: not working (retired)  Hand dominance: right    Treatments  Previous treatment: physical therapy, medication and injection treatment        Objective "     Observations     Right Hip  Positive for incision.     Additional Observation Details  Incision present along R posterior/lateral hip which is healing well with no signs of infection present. Decrease in sof t tissue mobility.   Tenderness to palpation about incisional scar .     Palpation     Right   Hypertonic in the lumbar paraspinals.   Tenderness of the gluteus medius, lumbar paraspinals and quadratus lumborum.   Trigger point to quadratus lumborum.     Lumbar Screen  Lumbar range of motion within normal limits with the following exceptions:Lumbar AROM extension severe loss, flexion min loss, L  SB mod  loss , R SB severe loss with LBP provocation    Neurological Testing     Sensation     Hip   Left Hip   Intact: light touch    Right Hip   Intact: light touch    Reflexes   Left   Patellar (L4): normal (2+)  Achilles (S1): trace (1+)    Right   Patellar (L4): normal (2+)  Achilles (S1): trace (1+)    Additional Neurological Details  Intermittent tremor RLE     Active Range of Motion   Left Hip   Flexion: 100 degrees   Extension: 0 degrees   Abduction: 30 degrees     Right Hip   Flexion: 105 degrees with pain  Extension: 5 degrees   Abduction: 30 (standing) degrees with pain    Additional Active Range of Motion Details  R  AA SLR: 50  L SLR: 50    Passive Range of Motion     Right Hip   Flexion: 108 degrees   Extension: 5 degrees   Abduction: 35 degrees     Strength/Myotome Testing     Left Hip   Planes of Motion   Flexion: 4+  Extension: 4  Abduction: 4  Adduction: 4+  External rotation: 4+  Internal rotation: 4+    Right Hip   Planes of Motion   Flexion: 4-  Extension: 4-  Abduction: 4-  Adduction: 4  External rotation: 4-  Internal rotation: 4-    Left Knee   Flexion: 4+  Extension: 4+    Right Knee   Flexion: 3-  Extension: 3-  Quadriceps contraction: fair    Left Ankle/Foot   Dorsiflexion: 4+  Plantar flexion: 4+  Inversion: 4+  Eversion: 4+  Great toe flexion: 4+  Great toe extension: 4+    Right  "Ankle/Foot   Dorsiflexion: 4+  Plantar flexion: 4+  Eversion: 4+  Great toe flexion: 4+  Great toe extension: 4+    Ambulation   Weight-Bearing Status   Assistive device used: two-wheeled walker and single point cane    Additional Weight-Bearing Status Details  Using SLC for household and short community distances, using FW walker at home in am or prn for LBP, R Knee pain reduction and improved gait mechanics.       Has shower chair, has handles for her toilet.    Not using  Breg hip brace      Ambulation: Stairs   Ascend stairs: independent  Pattern: non-reciprocal  Railings: one rail  Descend stairs: independent  Pattern: non-reciprocal  Railings: one rail    Additional Stairs Ambulation Details  Using cand and one HR nonreciprocal as R knee pain and LBP occurs with task    Observational Gait   Walking speed within functional limits. Decreased stride length.              Precautions: DOS: 2/15/24 (R hip trochanteric bursectomy with gluteus medius repair and IT band transfer), Lumbar surgery, R THR, HTN      Daily Treatment Diary:      Initial Evaluation Date: 03/13/24  Compliance 5/29 4/18 4/23 4/25 4/30 5/2 5/7 5/9 5/16 5/17   Visit Number [id number] 12  13  14 15 16  7 18 19 20   Re-Eval  Y                 Foto Captured Y                       5/29 4/18 4/23 4/25 4/30  5/2 5/7 5/9 5/16 5/17   Manual                    R Hip PROM nv --> -->  5m  5 m 8m  8m 8 min  8 min 8 min                      STM R scar, hip / lumbar soft tissue nv  10m  -->  10m R T/L PVM, QL  10m  R T/L PVM QL  - -   2 min 2 min   Ther-Ex                    Upright bike -nv  1m sore  5 m seat 7, L1  --  --          SLRs H/L -nv Hip isometrics 20x 5\" ea flex, abd, add, ext -->  --  --  hip abd sumbax iso H/L and neutral hip 2 x 0, 3\" Hip abd submax iso H/L and neutral hip 20x 3\" Hip abd suvmax iso H/L and neutral hip 3\" 20x  Hip abd suvmax iso H/L and neutral hip 3\" 20x Hip abd suvmax iso H/L and neutral hip 3\" 20x   BLE to U LE bridge with TA set " "-nv H/L march 2 x 15 Standing march 2 x 10  H/L AA 2 x 10  H/L AA 20x   H/L A 20x 2 H/L hip abd GTB 2 x 10, 5\" H/L hip Abd GTB 5\" 2x10  H/L hip abd GTB 5\" 2x10 H/L hip abd GTB 5\" 2x10   LAQ/SAQ -nv 2x10 2# 3\" -->   SAQ/LAQ 20x  SAQ 1# 20x3\", LAQ 1#  20x3\"  SAQ   2 # 30x/3\"/LAQ 2# 30x3\" SAQ  3# 20x3\"//LAQ 3# 20x3\" SAQ 3#  3\" 20x  LAQ 3# 3\" 20x  SAQ 3# B/L 3\" 20x     SAQ 3# B/L 3\" 20x    LAQ 3# 3\" 20x   QSets St hip 3 way 2 x 10 St hip 3 way 2 x 10 St 3 way B 2 x 10  20x5\" 10x 2, 5\" standing - B Bridge cues TA 2 x 10, WBAT RLE bridge 2 x 5 (75%) B/L bridge TA 2x10    WBAT RLE bridge 2x15  B/L bridge w/TA 2x10    WBAT RLE bridge 2x15     B/L bridge w/TA 2x10    WBAT RLE bridge 2x15   ASLRS  Supine AROM 5x  4 x 5 reps -->  30 x 30x 20x A  10x 1# 2 x 10 A supine Supine 2x10 AROM  supine 2x10 AROM Supine AROM 2x10 ea   Heel Slides - A 20x  --> -- AROM 30x  AROM 2 x 20 -       Clam shell MREs 10x   2 x 10 ea --> 2x10 2x10 3x10  20x5\" 5\" 20x AAROM 1st 10  AROM 2nd 10  AROM 5\" 20x AROM 5\" 20x   Hip abd L S/L  L  S/L fire hydrant MRE 10x     S/L Hip abd with extended knee 10x  MRE  AA 2 x 10 ea --> 2x10 fire hydrant, Hip abd straight knee cues for form 2x10 fire hydrant , hip abd straight knee 3 x 10 abd and fire hydrant 2 x 10 , 1# fire ydrant; hip abd 2 x 10 AA fatigue S/L Fire hydrant 1# R 2x10    S/L hip abd No wt 2x10 2x10       S/L fire hydrant 1# R  2x10    S/L hip abd No wts 2x10  S/L fire hydrant 1# R  2x10    S/L hip abd No wts 2x10   Reviewed and issued HEP POC , tx plan, goals and aerobic ex, SLB training HEP                   Neuro Re-Ed                    Mini squats   -  2 x 15  2 x 15 cues for form  --     -       SLB floor  SLB Air Ex 3 x 15-20 floor finger tip asst            Step ups -  4\" 2 x 10 RLE BHR A --         Step taps 4\" - - Alt 1 m 6\" x 2 BHR A --         Weight shifting  F/B, S to S //bars At counter 15x posture cues! 30x ea 30x ea WBAT RLE//TR 20x --     //bars SS x 4 laps //bars SS x 4 laps "   Ther-Act              STS    10x hi low modifed height no UE A  --           Amb/gait tr W and w/o - CS cane - cues stride length, pelvis, QS, GS m activ 4 laps fwd only f! Amb with SLC 100ft. X 2 cues longer stride upright posture    Parallel bars Little HR A stride length and stance time RLE. Core and hip m activ cueing 6 laps 6 laps //bars HR A stride length and stance time  6 laps //bars HR A stride length and stance time Amb through out clinic without SPC.           Side step //bars f!       Modalities                    CP R hip  HP LB R hip post tx x 10 m CP LB and R hip post tx 10 m HP L/S pre tx 10m CP L/S, R glut/hip  10m post tx CP R hip L/S post tx 10 m CP R hip , L/S 10 m seated post tx MHP R hip, L/S x 10 min seated MHP R hip L/S x 10 min  in S/L MHP R hip L/S x 10 min  in S/L

## 2024-05-29 NOTE — PROGRESS NOTES
PT Re-Evaluation     Today's date: 2024  Patient name: RACHNA Pulido  : 1950  MRN: [id number]  Referring provider: Lexi Green MD  Dx:   Encounter Diagnosis     ICD-10-CM    1. Pain in right hip  M25.551       2. Trochanteric bursitis, right hip  M70.61       3. Status post hip surgery  Z98.890                            Assessment  Impairments: abnormal gait, abnormal or restricted ROM, impaired physical strength, lacks appropriate home exercise program, pain with function, weight-bearing intolerance and poor posture   Other impairment: dec flexibiity, dec SLB RLE 6 seconds without UE support with sway and sense of weakness in R LE reported. SLB LLE 60 seconds with mild sway  Symptom irritability: moderate    Assessment details: The patient is a 75 y/o female who presents to PT s/p R hip surgery (R hip trochanteric bursectomy with gluteus medius repair and IT band transfer) on 2/15/24 by Dr. Pelletier.  She has complaints of intermittent pulling/pain in her hip.  She demonstrates deficits with decreased ROM and strength, decreased flexibility, antalgic gait with use of brace and AD, decreased balance and proprioception and edema.  These deficits lead to difficulty with stair negotiation, gait dysfunction and pain with completing her ADLs and tasks at home.  She is I with most tasks but her  has been assisting with LE dressing.  She is also using a shower chair when bathing.  She ambulates with use of brace locked 0-90* and RW for household and community distances.  Slow bashir is noted along with decreased weight shift to RLE in stance phase.  She has difficulty with stair negotiation - has been using the walker and going up and down the steps with non-reciprocal gait pattern.  She also has difficulty sleeping, has been sleeping on the recliner or sofa.  She is not currently driving.  Secondary to above deficits she is limited with her overall mobility and function.  The patient would  benefit from continued PT to address deficits and improve function.  Tx to include ROM, stretching, strengthening, modalities, HEP, pt education, postural ed, lifting/body mechanics, neuro re-ed, balance/proprioception Te, MT and equipment.      Updtae 4/11/24- Pt has been treated  10  times in Physical Therapy for care of R hip s/p gluteus minimus reapir and ITB tendon transfer compelted 2/15/24. Pt notes increase in functional WB tolerances and sleep in bed. Pt has progressed with WBAT RLE to cane use but is finding increase in chronic R Knee pain and R LBP with activity increase. She was educated in use of walker to assist with LBP and R Knee pain prn. Pt nnotes good tolerance to WB of R hip with out pain but does note intermittent soreness with function and lying onto R side for sleep.  She presents with tenderness to palpation of scar, R gluteal soft tissue, R  thoraco/lumbar  PVMs. She has mild swelling generalized in R knee with ROM 5-125 degrees. R hip ROM  and strength has progressed  as anticipated following surgery. Pt has progressed with WB AT RLE. She does require UE A for SLB On RLE currently and denies any hip pain sx. With task. Pt amb   with SLC  100ft.  Distance with non antalgic gait with decrease trunk rotation and RUE arm swing.  Pt will benefit from continued skilled care to achieve PT goals of care.         Update 5/29/24- Pt has been treated    times in PT for care of R hip s/p gluteus minimus repair with ITB tendon transfer with hx of R THR, spinal surgery and recent rhizotomy completed. Pt is amb. With /with out AD of SLC distance of      .    Stength of R hip   abduction  flexion and extension     /5. SLB LLE        seconds.        Understanding of Dx/Px/POC: good     Prognosis: good    Goals  STGs:  1.  Initiate and complete HEP with verbal cues. progressing  2.  Improve R hip ROM by 5-10 degrees in 4 weeks. achieved  3.  Improve R LE strength by 1/2 grade in 4 weeks. achieved  4.   Decrease R hip pain by 25% in 4 weeks achieved.  LTGs: progressing  1.  Patient to be I with HEP in 12 weeks.  2.  Improve R Hip ROM to WNL t/o in 12 weeks to improve function.  3.  Improve R LE strength to > or = to 4 to 4+/5 t/o in 12 weeks to improve function.  4.  Decrease R hip pain to < or = to 1-2/10 with activity in 12 weeks to improve function.   5.  Patient to ambulate with normalized gait pattern without AD in 12 weeks.  6.  Stair negotiation is improved to PLOF in 12 weeks.  7.  Recreational performance is improved to PLOF in 12 weeks.  8.  ADL performance is improved to PLOF in 12 weeks.            Plan  Patient would benefit from: skilled physical therapy  Planned modality interventions: cryotherapy and unattended electrical stimulation    Planned therapy interventions: abdominal trunk stabilization, gait training, functional ROM exercises, stretching, strengthening, neuromuscular re-education, manual therapy, home exercise program, therapeutic exercise, postural training, patient education, self care, balance and balance/weight bearing training    Frequency: 2x week  Duration in weeks: 6  Plan of Care beginning date: 5/16/2024  Plan of Care expiration date: 6/27/2024  Treatment plan discussed with: patient  Plan details:          Subjective Evaluation    History of Present Illness  Mechanism of injury: The was previously seen at this clinic for PT treatment from 10/24/23 to 1/18/24 for her R hip.  After she had been discharged she had seen the doctor and had surgery scheduled.  She had then been scheduled for surgery by Dr. Pelletier.  Surgery was 2/15/24, same day surgery at the surgical center in [place].  She had gone back to see the doctor for one follow up appointment, had her staples removed, per patient the doctor is happy with how she is doing.  She will be going back to see the doctor on 3/25 for her follow up appointment.      From EMR review of PT eval from 10/24/23:  Chronc pain in R  lateral hip since about one year duration. She has had PT in the past with improvements- most recently in 3/23 at our facility.  In the past, she has tried heel lifts,  AD of cane, salon pas, arnica gel, tylenol, mobic or celebrex, CP, injections in Each Hip  and lumbar spine, lumbar rhizotomies. Most recent injections into R hip were completed 1/23. Most recent rhizotomy  8/15/23 with some relief reported. Lumbar fusion L4-5 2022.   Pt was recently to Dr. Pelletier for assessment of R gluteus minimus tear and open repair was recommended she reports.  Pt notes she is seeking another opinion from [facility].   Pt notes regression in actvity tolerances and pain increases since June 2023 with out injury or known CADEN, but did travel for family vacation and had increase in pain sx.  She did note increase in sx of pain again with travel in September 2023 but no injury or incident occurring.   Was doing walking program at mall 30-35 minutes with SLC in winter 2023 when she felt her best and now reports inability to tolerate 30-35 minutes secondary to her pain.    Pt medications, PMH and allergies reviewed in chart and with  patient today.     The patient states that she has intermittent pain/soreness in her hip.  Pain with moving certain directions, also feels a pull in her hip.  Pull is noted around her incision.  She notes some swelling in her leg today, she has been using a compression stocking and ice on her foot to help with the swelling.  She denies any N&T into R LE.      Has not slept in her bed yet, has been on her recliner or sofa.   She had talked to her PA about her brace, was told to use it only for her first PT appointment.  She notes that she has not been wearing it consistently at home because at her follow up appointment the doctor told her she could stop using it.      Hannah 4/11/24- Pt has been seen in tx for 10 post op visits. She is progressing well with R hip function and pain control she notes but  "experiencing a recent flare of chronic R knee and R LBP with progression of WB advancement. Pt notes she is using cane  at all times except for am getting OOB in am,  using walker.  Pt. Notes no R hip pain, swelling. She notes trialing lying on R side for sleep.  Pt notes if LBP persists  or increases, she will reach out to her spine specialist and may discuss radiofrequency ablation as tx option or other tx options.     Update 5/29/24- Pt notes change of PCP  to Dr. Toth as Dr. Houston retired with recent med change to d/c naproxen  to  celebrex.  Pt notes undergoing  spinal procedure  of R cluneal nerve ablation, with out relief at this time but learned may take up to 6 weeks. Denies any new medical conditions or medication, denies any new allergies. Hip is doing \"ok\" needs to be stronger to walk up stairs, unevens and longer distances without use of SLC.  She feels she is progressing but finds she was able to walk further without care prior to surgery, now finds feeling in R hip/knee  is going to give and fatigues  when she tries to go without cane and hopes she would be doing better by now in terms of walking without her cane.  Pt notes she is not walking for exercise or riding stationary bike much currently with recent travel out of town. Pt notes R lower back pain does limit her amb  and standing tolerances currently. She feels she can do about one half mile prior to limited by her back pain. She notes difficulty standing to complete a meal prep and clean up with out resting secondary to her back pain. She notes no distal RLE sx.   And resolve of R hip pain she had prior to surgery. She notes no pain on L lower back intermittent L hip pain sx. She is not currently using her TENS unit. She notes am is the worst time of day for her back pain sx.   Patient Goals  Patient goals for therapy: decreased pain, increased strength, increased motion, independence with ADLs/IADLs and improved balance  Patient goal: " decrease pain medications to manage her pain, to stand to complete cooking, cleaning, ambulation longer and on stairs improved  Pain  Current pain ratin  At best pain ratin  At worst pain ratin  Location: R Hip pain 0/10 to 1/10 at worst,  4/10 R LB  with with standing and walking  increases to 7/10.  Quality: pressure, pulling, discomfort and sharp  Relieving factors: ice, rest and change in position  Aggravating factors: standing and walking  Progression: no change    Social Support  Steps to enter house: yes  1  Stairs in house: yes   Lives in: multiple-level home  Lives with: spouse    Employment status: not working (retired)  Hand dominance: right    Treatments  Previous treatment: physical therapy, medication and injection treatment        Objective     Observations     Right Hip  Positive for incision.     Additional Observation Details  Incision present along R posterior/lateral hip which is healing well with no signs of infection present. Decrease in sof t tissue mobility.   Tenderness to palpation about incisional scar .     Palpation     Right   Hypertonic in the lumbar paraspinals.   Tenderness of the gluteus medius, lumbar paraspinals and quadratus lumborum.   Trigger point to quadratus lumborum.     Lumbar Screen  Lumbar range of motion within normal limits with the following exceptions:Lumbar AROM extension severe loss, flexion min loss, L  SB mod  loss , R SB severe loss with LBP provocation    Neurological Testing     Sensation     Hip   Left Hip   Intact: light touch    Right Hip   Intact: light touch    Reflexes   Left   Patellar (L4): normal (2+)  Achilles (S1): trace (1+)    Right   Patellar (L4): normal (2+)  Achilles (S1): trace (1+)    Additional Neurological Details  Intermittent tremor RLE     Active Range of Motion   Left Hip   Flexion: 100 degrees   Extension: 0 degrees   Abduction: 30 degrees     Right Hip   Flexion: 105 degrees with pain  Extension: 5 degrees   Abduction: 30  (standing) degrees with pain    Additional Active Range of Motion Details  R  AA SLR: 50  L SLR: 50    Passive Range of Motion     Right Hip   Flexion: 108 degrees   Extension: 5 degrees   Abduction: 35 degrees     Strength/Myotome Testing     Left Hip   Planes of Motion   Flexion: 4+  Extension: 4  Abduction: 4  Adduction: 4+  External rotation: 4+  Internal rotation: 4+    Right Hip   Planes of Motion   Flexion: 4-  Extension: 4-  Abduction: 4-  Adduction: 4  External rotation: 4-  Internal rotation: 4-    Left Knee   Flexion: 4+  Extension: 4+    Right Knee   Flexion: 3-  Extension: 3-  Quadriceps contraction: fair    Left Ankle/Foot   Dorsiflexion: 4+  Plantar flexion: 4+  Inversion: 4+  Eversion: 4+  Great toe flexion: 4+  Great toe extension: 4+    Right Ankle/Foot   Dorsiflexion: 4+  Plantar flexion: 4+  Eversion: 4+  Great toe flexion: 4+  Great toe extension: 4+    Ambulation   Weight-Bearing Status   Assistive device used: two-wheeled walker and single point cane    Additional Weight-Bearing Status Details  Using SLC for household and short community distances, using FW walker at home in am or prn for LBP, R Knee pain reduction and improved gait mechanics.       Has shower chair, has handles for her toilet.    Not using  Breg hip brace      Ambulation: Stairs   Ascend stairs: independent  Pattern: non-reciprocal  Railings: one rail  Descend stairs: independent  Pattern: non-reciprocal  Railings: one rail    Additional Stairs Ambulation Details  Using cand and one HR nonreciprocal as R knee pain and LBP occurs with task    Observational Gait   Walking speed within functional limits. Decreased stride length.              Precautions: DOS: 2/15/24 (R hip trochanteric bursectomy with gluteus medius repair and IT band transfer), Lumbar surgery, R THR, HTN      Daily Treatment Diary:      Initial Evaluation Date: 03/13/24  Compliance 5/29 4/18 4/23 4/25 4/30 5/2 5/7 5/9 5/16 5/17   Visit Number 21 12  13  14 15  "16  7 18 19 20   Re-Eval  Y                 Foto Captured Y                       5/29 4/18 4/23 4/25 4/30 5/2 5/7 5/9 5/16 5/17   Manual                    R Hip PROM nv --> -->  5m  5 m 8m  8m 8 min  8 min 8 min                      STM R scar, hip / lumbar soft tissue nv  10m  -->  10m R T/L PVM, QL  10m  R T/L PVM QL  - -   2 min 2 min   Ther-Ex                    Upright bike -nv  1m sore  5 m seat 7, L1  --  --          SLRs H/L -nv Hip isometrics 20x 5\" ea flex, abd, add, ext -->  --  --  hip abd sumbax iso H/L and neutral hip 2 x 0, 3\" Hip abd submax iso H/L and neutral hip 20x 3\" Hip abd suvmax iso H/L and neutral hip 3\" 20x  Hip abd suvmax iso H/L and neutral hip 3\" 20x Hip abd suvmax iso H/L and neutral hip 3\" 20x   BLE to U LE bridge with TA set -nv H/L march 2 x 15 Standing march 2 x 10  H/L AA 2 x 10  H/L AA 20x   H/L A 20x 2 H/L hip abd GTB 2 x 10, 5\" H/L hip Abd GTB 5\" 2x10  H/L hip abd GTB 5\" 2x10 H/L hip abd GTB 5\" 2x10   LAQ/SAQ -nv 2x10 2# 3\" -->   SAQ/LAQ 20x  SAQ 1# 20x3\", LAQ 1#  20x3\"  SAQ   2 # 30x/3\"/LAQ 2# 30x3\" SAQ  3# 20x3\"//LAQ 3# 20x3\" SAQ 3#  3\" 20x  LAQ 3# 3\" 20x  SAQ 3# B/L 3\" 20x     SAQ 3# B/L 3\" 20x    LAQ 3# 3\" 20x   QSets St hip 3 way 2 x 10 St hip 3 way 2 x 10 St 3 way B 2 x 10  20x5\" 10x 2, 5\" standing - B Bridge cues TA 2 x 10, WBAT RLE bridge 2 x 5 (75%) B/L bridge TA 2x10    WBAT RLE bridge 2x15  B/L bridge w/TA 2x10    WBAT RLE bridge 2x15     B/L bridge w/TA 2x10    WBAT RLE bridge 2x15   ASLRS  Supine AROM 5x  4 x 5 reps -->  30 x 30x 20x A  10x 1# 2 x 10 A supine Supine 2x10 AROM  supine 2x10 AROM Supine AROM 2x10 ea   Heel Slides - A 20x  --> -- AROM 30x  AROM 2 x 20 -       Clam shell MREs 10x   2 x 10 ea --> 2x10 2x10 3x10  20x5\" 5\" 20x AAROM 1st 10  AROM 2nd 10  AROM 5\" 20x AROM 5\" 20x   Hip abd L S/L  L  S/L fire hydrant MRE 10x     S/L Hip abd with extended knee 10x  MRE  AA 2 x 10 ea --> 2x10 fire hydrant, Hip abd straight knee cues for form 2x10 fire hydrant " ", hip abd straight knee 3 x 10 abd and fire hydrant 2 x 10 , 1# fire ydrant; hip abd 2 x 10 AA fatigue S/L Fire hydrant 1# R 2x10    S/L hip abd No wt 2x10 2x10       S/L fire hydrant 1# R  2x10    S/L hip abd No wts 2x10  S/L fire hydrant 1# R  2x10    S/L hip abd No wts 2x10   Reviewed and issued HEP POC , tx plan, goals and aerobic ex, SLB training HEP                   Neuro Re-Ed                    Mini squats   -  2 x 15  2 x 15 cues for form  --     -       SLB floor  SLB Air Ex 3 x 15-20 floor finger tip asst            Step ups -  4\" 2 x 10 RLE BHR A --         Step taps 4\" - - Alt 1 m 6\" x 2 BHR A --         Weight shifting  F/B, S to S //bars At counter 15x posture cues! 30x ea 30x ea WBAT RLE//TR 20x --     //bars SS x 4 laps //bars SS x 4 laps   Ther-Act              STS    10x hi low modifed height no UE A  --           Amb/gait tr W and w/o - CS cane - cues stride length, pelvis, QS, GS m activ 4 laps fwd only f! Amb with SLC 100ft. X 2 cues longer stride upright posture    Parallel bars Little HR A stride length and stance time RLE. Core and hip m activ cueing 6 laps 6 laps //bars HR A stride length and stance time  6 laps //bars HR A stride length and stance time Amb through out clinic without SPC.           Side step //bars f!       Modalities                    CP R hip  HP LB R hip post tx x 10 m CP LB and R hip post tx 10 m HP L/S pre tx 10m CP L/S, R glut/hip  10m post tx CP R hip L/S post tx 10 m CP R hip , L/S 10 m seated post tx MHP R hip, L/S x 10 min seated MHP R hip L/S x 10 min  in S/L MHP R hip L/S x 10 min  in S/L                                              "

## 2024-05-30 ENCOUNTER — OFFICE VISIT (OUTPATIENT)
Dept: PHYSICAL THERAPY | Facility: CLINIC | Age: 74
End: 2024-05-30
Payer: MEDICARE

## 2024-05-30 ENCOUNTER — APPOINTMENT (OUTPATIENT)
Dept: PHYSICAL THERAPY | Facility: CLINIC | Age: 74
End: 2024-05-30
Payer: MEDICARE

## 2024-05-30 DIAGNOSIS — M25.551 PAIN IN RIGHT HIP: Primary | ICD-10-CM

## 2024-05-30 DIAGNOSIS — Z98.890 STATUS POST HIP SURGERY: ICD-10-CM

## 2024-05-30 DIAGNOSIS — M70.61 TROCHANTERIC BURSITIS, RIGHT HIP: ICD-10-CM

## 2024-05-30 PROCEDURE — 97110 THERAPEUTIC EXERCISES: CPT | Performed by: PHYSICAL THERAPIST

## 2024-05-30 PROCEDURE — 97112 NEUROMUSCULAR REEDUCATION: CPT

## 2024-05-30 NOTE — PROGRESS NOTES
"Daily Note     Today's date: 2024  Patient name: RACHNA Pulido  : 1950  MRN: [id number]  Referring provider: Lexi Green MD  Dx:   Encounter Diagnosis     ICD-10-CM    1. Pain in right hip  M25.551       2. Trochanteric bursitis, right hip  M70.61       3. Status post hip surgery  Z98.890           Start Time: 0900          Subjective: Pt notes she began Celebrex as directed with less pain in lower back today. Patient reports she is not having any hip pain today.       Objective: See treatment diary below. Pt completed TE and neuro-muscular maritza as documented which was assisted by Ruby Turcios PTA today as documented. Pt noted fatigue with ASLRs, hip abd in L S/L, clam shells , modified fire hydrant and required AA of PT with clam shells hip abd with knee extended with cues for QS needed.  Pt completed 7m of upright LBE L1 with posture cues completed with out pain or difficulty noted.  Pt proceeded to standing weight shifting  with note of fatigue without pain through lower back gluteal region  R. Pt completed SLB with B UE A as needed 10\" hold x 4 reps again with note of faitgue an UE A to maintain balance and level pelvis. CP utilized post tx to assist with any post tx soreness.      Assessment: Tolerated treatment well. Patient demonstrated fatigue post treatment and would benefit from continued PT to achieve goals of care.       Plan: Continue per plan of care.      Precautions: DOS: 2/15/24 (R hip trochanteric bursectomy with gluteus medius repair and IT band transfer), Lumbar surgery, R THR, HTN      Daily Treatment Diary:      Initial Evaluation Date: 24  Compliance    Visit Number 21 22  13  14 15 16  7 18 19 20   Re-Eval  Y -                Foto Captured Y -                         Manual                    R Hip PROM nv --> -->  5m  5 m 8m  8m 8 min  8 min 8 min                      STM " "R scar, hip / lumbar soft tissue nv -->  -->  10m R T/L PVM, QL  10m  R T/L PVM QL  - -   2 min 2 min   Ther-Ex                    Upright bike -nv  7m L 1, seat 7   --  --          SLRs H/L -nv -see below   --  --   0x     BLE to U LE bridge with TA set -nv H/L march 2 x 15//B Bridge  to U Bridge  with TA 2 x 10 Standing march 2 x 10  H/L AA 2 x 10  H/L AA 20x   H/L A 20x 2 H/L hip abd GTB 2 x 10, 5\" H/L hip Abd GTB 5\" 2x10  H/L hip abd GTB 5\" 2x10 H/L hip abd GTB 5\" 2x10   LAQ/SAQ        Seated march -nv 2x10 3# 3\"       3# 3 x 10 cues TA -->   SAQ/LAQ 20x  SAQ 1# 20x3\", LAQ 1#  20x3\"  SAQ   2 # 30x/3\"/LAQ 2# 30x3\" SAQ  3# 20x3\"//LAQ 3# 20x3\" SAQ 3#  3\" 20x  LAQ 3# 3\" 20x  SAQ 3# B/L 3\" 20x     SAQ 3# B/L 3\" 20x    LAQ 3# 3\" 20x   QSets St hip 3 way 2 x 10 - St 3 way B 2 x 10  20x5\" 10x 2, 5\" standing -           ASLRS  Supine AROM 5x 2 x 10  -->  30 x 30x 20x A  10x 1# 2 x 10 A supine Supine 2x10 AROM  supine 2x10 AROM Supine AROM 2x10 ea   Heel Slides - -  --> -- AROM 30x  AROM 2 x 20 -       Clam shell MREs 10x   2 x 10 ea AA --> 2x10 2x10 3x10  20x5\" 5\" 20x AAROM 1st 10  AROM 2nd 10  AROM 5\" 20x AROM 5\" 20x   Hip abd L S/L  L  S/L fire hydrant MRE 10x     S/L Hip abd with extended knee 10x  MRE  AA 2 x 10 ea// active fire hydrant  2 x 10 --> 2x10 fire hydrant, Hip abd straight knee cues for form 2x10 fire hydrant , hip abd straight knee 3 x 10 abd and fire hydrant 2 x 10 , 1# fire ydrant; hip abd 2 x 10 AA fatigue S/L Fire hydrant 1# R 2x10    S/L hip abd No wt 2x10 2x10       S/L fire hydrant 1# R  2x10    S/L hip abd No wts 2x10  S/L fire hydrant 1# R  2x10    S/L hip abd No wts 2x10   Reviewed and issued HEP POC , tx plan, goals and aerobic ex, SLB training HEP  -                 Neuro Re-Ed                    Mini squats   - -  2 x 15 cues for form  --     -       SLB floor  SLB Air Ex 3 x 15-20 floor finger tip asst 4 x 10\" RLE finger tip assist AS            Step ups -  4\" 2 x 10 RLE BHR A --       " "  Step taps 4\" - - Alt 1 m 6\" x 2 BHR A --         Weight shifting  F/B, S to S //bars At counter 15x posture cues! 20x ea parallel bars f!  AS  30x ea WBAT RLE//TR 20x --     //bars SS x 4 laps //bars SS x 4 laps   Ther-Act              STS    10x hi low modifed height no UE A  --           Amb/gait tr W and w/o - CS cane - cues stride length, pelvis, QS, GS m activ -                   Side step //bars f!       Modalities                    CP R hip  CP LB R hip post tx x 10 m AS CP LB and R hip post tx 10 m HP L/S pre tx 10m CP L/S, R glut/hip  10m post tx CP R hip L/S post tx 10 m CP R hip , L/S 10 m seated post tx MHP R hip, L/S x 10 min seated MHP R hip L/S x 10 min  in S/L MHP R hip L/S x 10 min  in S/L                                                   "

## 2024-05-31 ENCOUNTER — APPOINTMENT (OUTPATIENT)
Dept: PHYSICAL THERAPY | Facility: CLINIC | Age: 74
End: 2024-05-31
Payer: MEDICARE

## 2024-06-04 ENCOUNTER — OFFICE VISIT (OUTPATIENT)
Dept: PHYSICAL THERAPY | Facility: CLINIC | Age: 74
End: 2024-06-04
Payer: MEDICARE

## 2024-06-04 DIAGNOSIS — Z98.890 STATUS POST HIP SURGERY: ICD-10-CM

## 2024-06-04 DIAGNOSIS — M25.551 PAIN IN RIGHT HIP: Primary | ICD-10-CM

## 2024-06-04 DIAGNOSIS — M70.61 TROCHANTERIC BURSITIS, RIGHT HIP: ICD-10-CM

## 2024-06-04 PROCEDURE — 97110 THERAPEUTIC EXERCISES: CPT | Performed by: PHYSICAL THERAPIST

## 2024-06-04 PROCEDURE — 97530 THERAPEUTIC ACTIVITIES: CPT | Performed by: PHYSICAL THERAPIST

## 2024-06-04 PROCEDURE — 97112 NEUROMUSCULAR REEDUCATION: CPT | Performed by: PHYSICAL THERAPIST

## 2024-06-04 NOTE — PROGRESS NOTES
Daily Note     Today's date: 2024  Patient name: RACHNA Pulido  : 1950  MRN: [id number]  Referring provider: Lexi Green MD  Dx:   Encounter Diagnosis     ICD-10-CM    1. Pain in right hip  M25.551       2. Trochanteric bursitis, right hip  M70.61       3. Status post hip surgery  Z98.890                      Subjective: Pt notes she did report  difficulty with fatigue and weakness in R hip when advances to no AD. Notes Dr. Pelletier ordered MRI  Friday and follows with him 24.  Pt notes daily R L/S pain persists and limits duration of amb with AD of SLC, has not used walker. Notes fatigue of R hip mm limits amb with out AD.       Objective: See treatment diary below. Pt completed strength ex in NWB and WB with note of fatigue with tasks, muscle tightness with out pain. When fatigued pt demonstrates difficulty with maintaining pelvis level for single limb stance  while WB. Cueing and use of B UE A required for posture neutral and avoiding pelvis drop while WB On RLE.Pt did note fatigue of RLE, lower back and buttock mm with amb without AD and when fatigue demonstrates decrease in LLE stride length and stance time RLE, pelvis drop. Sx resolve with brief rest seated.       Assessment: Tolerated treatment well. Patient demonstrated fatigue post treatment and would benefit from continued PT to achieve goals of care.       Plan: Continue per plan of care.      Precautions: DOS: 2/15/24 (R hip trochanteric bursectomy with gluteus medius repair and IT band transfer), Lumbar surgery, R THR, HTN      Daily Treatment Diary:      Initial Evaluation Date: 24  Compliance    Visit Number 21 22 23  14 15 16  7 18 19 20   Re-Eval  Y -                Foto Captured Y -                         Manual                    R Hip PROM nv --> -->  5m  5 m 8m  8m 8 min  8 min 8 min                      STM R scar, hip /  "lumbar soft tissue nv -->  -->  10m R T/L PVM, QL  10m  R T/L PVM QL  - -   2 min 2 min   Ther-Ex                    Upright bike -nv  7m L 1, seat 7 7m L1 seat 7  --  --          SLRs H/L -nv -see below 2 x 10 cues TA, QS  --  --   0x     BLE to U LE bridge with TA set -nv H/L march 2 x 15//B Bridge  to U Bridge  with TA 2 x 10 2 x 10 B, 2 x 10 WBAT RLE, 2 x 10 GTB bridge with clam shell 3\" 2 x 10  H/L AA 2 x 10  H/L AA 20x   H/L A 20x 2 H/L hip abd GTB 2 x 10, 5\" H/L hip Abd GTB 5\" 2x10  H/L hip abd GTB 5\" 2x10 H/L hip abd GTB 5\" 2x10   LAQ/SAQ        Seated march -nv 2x10 3# 3\"       3# 3 x 10 cues TA -->   SAQ/LAQ 20x  SAQ 1# 20x3\", LAQ 1#  20x3\"  SAQ   2 # 30x/3\"/LAQ 2# 30x3\" SAQ  3# 20x3\"//LAQ 3# 20x3\" SAQ 3#  3\" 20x  LAQ 3# 3\" 20x  SAQ 3# B/L 3\" 20x     SAQ 3# B/L 3\" 20x    LAQ 3# 3\" 20x   QSets St hip 3 way 2 x 10 - St 3 way B 2 x 10 UE A cues posture  20x5\" 10x 2, 5\" standing -           ASLRS  Supine AROM 5x 2 x 10  -->  30 x 30x 20x A  10x 1# 2 x 10 A supine Supine 2x10 AROM  supine 2x10 AROM Supine AROM 2x10 ea   Heel Slides - -  --> -- AROM 30x  AROM 2 x 20 -       Clam shell MREs 10x   2 x 10 ea AA 2x10 cues 2x10 2x10 3x10  20x5\" 5\" 20x AAROM 1st 10  AROM 2nd 10  AROM 5\" 20x AROM 5\" 20x   Hip abd L S/L  L  S/L fire hydrant MRE 10x     S/L Hip abd with extended knee 10x  MRE  AA 2 x 10 ea// active fire hydrant  2 x 10 AA 2 x 10//active fire hydrant 2 x 10 2x10 fire hydrant, Hip abd straight knee cues for form 2x10 fire hydrant , hip abd straight knee 3 x 10 abd and fire hydrant 2 x 10 , 1# fire ydrant; hip abd 2 x 10 AA fatigue S/L Fire hydrant 1# R 2x10    S/L hip abd No wt 2x10 2x10       S/L fire hydrant 1# R  2x10    S/L hip abd No wts 2x10  S/L fire hydrant 1# R  2x10    S/L hip abd No wts 2x10   Reviewed and issued HEP POC , tx plan, goals and aerobic ex, SLB training HEP  -                 Neuro Re-Ed                    Mini squats   - -  2 x 15 cues for form  --     -       SLB floor  SLB Air Ex " "3 x 15-20 floor finger tip asst 4 x 10\" RLE finger tip assist AS  4x10\" RLE finger tip A           Step ups -  - --         Step taps 4\" - - - --         Weight shifting  F/B, S to S //bars At counter 15x posture cues! 20x ea parallel bars f!  AS  30x ea WBAT RLE FWD, Lat 30x --     //bars SS x 4 laps //bars SS x 4 laps   Ther-Act              STS     --           Amb/gait tr W and w/o - CS cane - cues stride length, pelvis, QS, GS m activ - 4 laps in //bars and 2 laps gym cues inc stride LLE glut activ RLE stance                  Side step //bars f!       Modalities                    CP R hip  CP LB R hip post tx x 10 m AS CP LB and R hip post tx 10 m HP L/S pre tx 10m CP L/S, R glut/hip  10m post tx CP R hip L/S post tx 10 m CP R hip , L/S 10 m seated post tx MHP R hip, L/S x 10 min seated MHP R hip L/S x 10 min  in S/L MHP R hip L/S x 10 min  in S/L                                                     "

## 2024-06-06 ENCOUNTER — OFFICE VISIT (OUTPATIENT)
Dept: PHYSICAL THERAPY | Facility: CLINIC | Age: 74
End: 2024-06-06
Payer: MEDICARE

## 2024-06-06 DIAGNOSIS — M25.551 PAIN IN RIGHT HIP: Primary | ICD-10-CM

## 2024-06-06 DIAGNOSIS — M70.61 TROCHANTERIC BURSITIS, RIGHT HIP: ICD-10-CM

## 2024-06-06 DIAGNOSIS — Z98.890 STATUS POST HIP SURGERY: ICD-10-CM

## 2024-06-06 PROCEDURE — 97110 THERAPEUTIC EXERCISES: CPT | Performed by: PHYSICAL THERAPIST

## 2024-06-06 PROCEDURE — 97530 THERAPEUTIC ACTIVITIES: CPT | Performed by: PHYSICAL THERAPIST

## 2024-06-06 NOTE — PROGRESS NOTES
Daily Note     Today's date: 2024  Patient name: RACHNA Pulido  : 1950  MRN: [id number]  Referring provider: Lexi Green MD  Dx:   Encounter Diagnosis     ICD-10-CM    1. Pain in right hip  M25.551       2. Trochanteric bursitis, right hip  M70.61       3. Status post hip surgery  Z98.890           Start Time: 1045  Stop Time: 1130  Total time in clinic (min): 45 minutes    Subjective: Pt notes she had a good tolerance to last tx. Notes R LBP, R Knee pain and L hip pain to be limiting with  WB function in am and with standing and walking today. She notes use of celebrex, SLC, seated rests, topical meds, HP/CP to assist with sx. She notes no use of walker  no use of home TENs, no use of her stationary bike in sx managements or ex program. She notes no hip pain sx. But admits to weakness  and back pain sx with WB without AD, difficulty with upright posture with out AD. Pt notes all sx resolve with seated or recumbent. Pt notes she is having MRI tomorrow as per Dr. Pelletier.  She notes 3 weeks since last rhizotomy without improvement in Back pain sx.       Objective: See treatment diary below. Pt progressed to PREs R hip in standing and increased sets to 3 x 10 reps of [place] hip abduction strength ex today. Pt noted no hip pain sx but fatigue as limiting and she did require Min A  and cues for completion of Hip abd with extended knee. Difficulty with muscle contraction quality of RLE noted with fatigue but denies any pain sx. At times observe tremor in RLE at rest and with exertion and pt notes she has discussed with her PCP and Dr. James Wick in past.   Pt R knee pain limited tolerance to amb  or attempts as SLB this date.   Pt educated in self  managements of HP, CP, walker, gentle  ROM ex in am, use of TENs, use of   Home stationary bike to assist with sx reduction and progression toward WB goals of care.   Assessment: Tolerated treatment well but sx of R LBP, R Knee pain and fatigue of R  "Hip limit WB tolerances for gait, SLB and functional WB tasks. She is not limited by hip pain.  R hip abduction strength is decreased with limited endurance but is not painful. Recommended pt to cont to use AD prn to assist with gait mechanics and sx modulation.  Patient demonstrated fatigue post treatment and would benefit from continued PT to achieve goals of care.       Plan: Continue per plan of care.      Precautions: DOS: 2/15/24 (R hip trochanteric bursectomy with gluteus medius repair and IT band transfer), Lumbar surgery, R THR, HTN      Daily Treatment Diary:      Initial Evaluation Date: 03/13/24  Compliance 5/29 5/30 6/4 6/6 4/30 5/2 5/7 5/9 5/16 5/17   Visit Number 21 22 23 24 15 16  7 18 19 20   Re-Eval  Y -    -            Foto Captured Y -    -                  5/29 5/30 6/4 6/6 4/30 5/2 5/7 5/9 5/16 5/17   Manual                    R Hip PROM nv --> -->  -  5 m 8m  8m 8 min  8 min 8 min                      STM R scar, hip / lumbar soft tissue nv -->  --> -  10m  R T/L PVM QL  - -   2 min 2 min   Ther-Ex                    Upright bike -nv  7m L 1, seat 7 7m L1 seat 7 7m seat 7, L1 stopped sec to R knee p!  --          SLRs H/L -nv -see below 2 x 10 cues TA, QS -  --   0x     BLE to U LE bridge with TA set -nv H/L march 2 x 15//B Bridge  to U Bridge  with TA 2 x 10 2 x 10 B, 2 x 10 WBAT RLE, 2 x 10 GTB bridge with clam shell 3\" 2 x 10 2 x 10 B, 2 x 10 RLE only, 3 x 10 x 3\" GTB bridges with clam shell 3\" 3 x 10  H/L AA 20x   H/L A 20x 2 H/L hip abd GTB 2 x 10, 5\" H/L hip Abd GTB 5\" 2x10  H/L hip abd GTB 5\" 2x10 H/L hip abd GTB 5\" 2x10   LAQ/SAQ        Seated march -nv 2x10 3# 3\"       3# 3 x 10 cues TA -->  -        March in H/L 2 x 15  SAQ 1# 20x3\", LAQ 1#  20x3\"  SAQ   2 # 30x/3\"/LAQ 2# 30x3\" SAQ  3# 20x3\"//LAQ 3# 20x3\" SAQ 3#  3\" 20x  LAQ 3# 3\" 20x  SAQ 3# B/L 3\" 20x     SAQ 3# B/L 3\" 20x    LAQ 3# 3\" 20x   QSets St hip 3 way 2 x 10 - St 3 way B 2 x 10 UE A cues posture St 3 way R only 2# 2 x 10 " "cues posture 10x 2, 5\" standing -           ASLRS  Supine AROM 5x 2 x 10  -->  30 x 30x 20x A  10x 1# 2 x 10 A supine Supine 2x10 AROM  supine 2x10 AROM Supine AROM 2x10 ea   Heel Slides - -  --> -- AROM 30x  AROM 2 x 20 -       Clam shell MREs 10x   2 x 10 ea AA 2x10 cues 3x10 2x10 3x10  20x5\" 5\" 20x AAROM 1st 10  AROM 2nd 10  AROM 5\" 20x AROM 5\" 20x   Hip abd L S/L  L  S/L fire hydrant MRE 10x     S/L Hip abd with extended knee 10x  MRE  AA 2 x 10 ea// active fire hydrant  2 x 10 AA 2 x 10//active fire hydrant 2 x 10 3 x 10 1# fire hydrant, Hip abd straight knee cues for form 3 x 10 AA 2x10 fire hydrant , hip abd straight knee 3 x 10 abd and fire hydrant 2 x 10 , 1# fire ydrant; hip abd 2 x 10 AA fatigue S/L Fire hydrant 1# R 2x10    S/L hip abd No wt 2x10 2x10       S/L fire hydrant 1# R  2x10    S/L hip abd No wts 2x10  S/L fire hydrant 1# R  2x10    S/L hip abd No wts 2x10   Reviewed and issued HEP POC , tx plan, goals and aerobic ex, SLB training HEP  -                 Neuro Re-Ed                    Mini squats   - -  2 x 15 cues for form Unable R knee p!     -       SLB floor  SLB Air Ex 3 x 15-20 floor finger tip asst 4 x 10\" RLE finger tip assist AS  4x10\" RLE finger tip A  Unable R knee p!         Step ups -  - --         Step taps 4\" - - - --         Weight shifting  F/B, S to S //bars At counter 15x posture cues! 20x ea parallel bars f!  AS  30x ea WBAT RLE FWD, Lat 30x 30x ea WBAT RLE fwd, lat     //bars SS x 4 laps //bars SS x 4 laps   Ther-Act              STS     --           Amb/gait tr W and w/o - CS cane - cues stride length, pelvis, QS, GS m activ - 4 laps in //bars and 2 laps gym cues inc stride LLE glut activ RLE stance 4 laps in //bars, side step in //bars cues inc LLE stride and glut active RLE stance, posture                 Side step //bars f!       Modalities                    CP R hip  CP LB R hip post tx x 10 m AS CP LB and R hip post tx 10 m CP L/S post tx x  10m CP L/S, R " glut/hip  10m post tx CP R hip L/S post tx 10 m CP R hip , L/S 10 m seated post tx MHP R hip, L/S x 10 min seated MHP R hip L/S x 10 min  in S/L MHP R hip L/S x 10 min  in S/L

## 2024-06-11 ENCOUNTER — APPOINTMENT (OUTPATIENT)
Dept: PHYSICAL THERAPY | Facility: CLINIC | Age: 74
End: 2024-06-11
Payer: MEDICARE

## 2024-06-12 ENCOUNTER — OFFICE VISIT (OUTPATIENT)
Dept: PHYSICAL THERAPY | Facility: CLINIC | Age: 74
End: 2024-06-12
Payer: MEDICARE

## 2024-06-12 DIAGNOSIS — Z98.890 STATUS POST HIP SURGERY: ICD-10-CM

## 2024-06-12 DIAGNOSIS — M70.61 TROCHANTERIC BURSITIS, RIGHT HIP: ICD-10-CM

## 2024-06-12 DIAGNOSIS — M25.551 PAIN IN RIGHT HIP: Primary | ICD-10-CM

## 2024-06-12 PROCEDURE — 97530 THERAPEUTIC ACTIVITIES: CPT | Performed by: PHYSICAL THERAPIST

## 2024-06-12 NOTE — PROGRESS NOTES
Daily Note     Today's date: 2024  Patient name: RACHNA Pulido  : 1950  MRN: [id number]  Referring provider: Lexi Green MD  Dx:   Encounter Diagnosis     ICD-10-CM    1. Pain in right hip  M25.551       2. Trochanteric bursitis, right hip  M70.61       3. Status post hip surgery  Z98.890           Start Time: 0945          Subjective: Pt notes she is doing well today with no pain pre tx in back, knee or hip.Pt follows with Dr. Pelletier Monday.       Objective: See treatment diary below.Pt brought MRI result showing no soft tissue abnormality of R hip.Pt tolerated progression with PREs and strength ex today. Tolerated WB tasks longer prior to fatigue of R LE and difficulty maintaining level pelvis with WB tasks.       Assessment: Tolerated treatment well. Patient would benefit from continued PT to achieve goals of care. Ability to progress toward WB functional goals impacted by chronic nature of LBP,   R Knee pain/OA  but will cont to modify tx to avoid pain aggravation to progress to goals for R hip. As tolerated.        Plan: Continue per plan of care.      Precautions: DOS: 2/15/24 (R hip trochanteric bursectomy with gluteus medius repair and IT band transfer), Lumbar surgery, R THR, HTN      Daily Treatment Diary:      Initial Evaluation Date: 24  Compliance         Visit Number 21 22 23 24 25        Re-Eval  Y -    -            Foto Captured Y -    -                          Manual                    R Hip PROM nv --> -->  - -                           STM R scar, hip / lumbar soft tissue nv -->  --> - -  -        Ther-Ex                    Upright bike -nv  7m L 1, seat 7 7m L1 seat 7 7m seat 7, L1 stopped sec to R knee p! 2 miles, seat 7 , L1          SLRs H/L -nv -see below 2 x 10 cues TA, QS - 2 x 10 1#   0x     BLE to U LE bridge with TA set -nv H/L march 2 x 15//B Bridge  to U Bridge  with TA 2 x 10 2 x 10 B, 2 x 10 WBAT RLE, 2 x 10  "GTB bridge with clam shell 3\" 2 x 10 2 x 10 B, 2 x 10 RLE only, 3 x 10 x 3\" GTB bridges with clam shell 3\" 3 x 10  2x 10 B, 2 x 10 RLE only, 3 x 10 x 3\" GTB bridges with clam shell 3\" 3 x 10  H/L A 20x 2 H/L hip abd GTB 2 x 10, 5\" H/L hip Abd GTB 5\" 2x10  H/L hip abd GTB 5\" 2x10 H/L hip abd GTB 5\" 2x10   LAQ/SAQ        Seated march -nv 2x10 3# 3\"       3# 3 x 10 cues TA -->  -        March in H/L 2 x 15 -nv  SAQ   2 # 30x/3\"/LAQ 2# 30x3\" SAQ  3# 20x3\"//LAQ 3# 20x3\" SAQ 3#  3\" 20x  LAQ 3# 3\" 20x  SAQ 3# B/L 3\" 20x     SAQ 3# B/L 3\" 20x    LAQ 3# 3\" 20x   QSets St hip 3 way 2 x 10 - St 3 way B 2 x 10 UE A cues posture St 3 way R only 2# 2 x 10 cues posture St 3 way R 2#, L 0# 2x10 flex, abd, ext, SLR 2 x 10 -           ASLRS  Supine AROM 5x 2 x 10  -->  30 x -see above pre 20x A  10x 1# 2 x 10 A supine Supine 2x10 AROM  supine 2x10 AROM Supine AROM 2x10 ea   Heel Slides - -  --> -- AROM 30x  AROM 2 x 20 -       Clam shell MREs 10x   2 x 10 ea AA 2x10 cues 3x10 2x10 3x10  20x5\" 5\" 20x AAROM 1st 10  AROM 2nd 10  AROM 5\" 20x AROM 5\" 20x   Hip abd L S/L  L  S/L fire hydrant MRE 10x     S/L Hip abd with extended knee 10x  MRE  AA 2 x 10 ea// active fire hydrant  2 x 10 AA 2 x 10//active fire hydrant 2 x 10 3 x 10 1# fire hydrant, Hip abd straight knee cues for form 3 x 10 AA 2x10 fire 2#hydrant , hip abd straight knee AA cues for form, clam shell with OTB  3 x 10 abd and fire hydrant 2 x 10 , 1# fire ydrant; hip abd 2 x 10 AA fatigue S/L Fire hydrant 1# R 2x10    S/L hip abd No wt 2x10 2x10       S/L fire hydrant 1# R  2x10    S/L hip abd No wts 2x10  S/L fire hydrant 1# R  2x10    S/L hip abd No wts 2x10   Reviewed and issued HEP POC , tx plan, goals and aerobic ex, SLB training HEP  -                 Neuro Re-Ed                    Mini squats   - -  2 x 15 cues for form Unable R knee p! -   -       SLB floor  SLB Air Ex 3 x 15-20 floor finger tip asst 4 x 10\" RLE finger tip assist AS  4x10\" RLE finger tip A  Unable R " "knee p! SLB training RLE 5 x 10\", 2 sets        Step ups -  - --         Step taps 4\" - - - --         Weight shifting  F/B, S to S //bars At counter 15x posture cues! 20x ea parallel bars f!  AS  30x ea WBAT RLE FWD, Lat 30x 30x ea WBAT RLE fwd, lat -    //bars SS x 4 laps //bars SS x 4 laps   Ther-Act              STS     --           Amb/gait tr W and w/o - CS cane - cues stride length, pelvis, QS, GS m activ - 4 laps in //bars and 2 laps gym cues inc stride LLE glut activ RLE stance 4 laps in //bars, side step in //bars cues inc LLE stride and glut active RLE stance, posture 6laps //bars side step, cues inc LLE stride, pace, lumbar spine posture-mod cueing glut activ, quad activ                Side step //bars f!       Modalities                    CP R hip  CP LB R hip post tx x 10 m AS CP LB and R hip post tx 10 m CP L/S post tx x  10m CP L/S, R glut/hip  10m post tx CP R hip L/S post tx 10 m CP R hip , L/S 10 m seated post tx MHP R hip, L/S x 10 min seated MHP R hip L/S x 10 min  in S/L MHP R hip L/S x 10 min  in S/L                                                         "

## 2024-06-13 ENCOUNTER — APPOINTMENT (OUTPATIENT)
Dept: PHYSICAL THERAPY | Facility: CLINIC | Age: 74
End: 2024-06-13
Payer: MEDICARE

## 2024-06-14 ENCOUNTER — OFFICE VISIT (OUTPATIENT)
Dept: PHYSICAL THERAPY | Facility: CLINIC | Age: 74
End: 2024-06-14
Payer: MEDICARE

## 2024-06-14 DIAGNOSIS — Z98.890 STATUS POST HIP SURGERY: ICD-10-CM

## 2024-06-14 DIAGNOSIS — M70.61 TROCHANTERIC BURSITIS, RIGHT HIP: ICD-10-CM

## 2024-06-14 DIAGNOSIS — M25.551 PAIN IN RIGHT HIP: Primary | ICD-10-CM

## 2024-06-14 PROCEDURE — 97110 THERAPEUTIC EXERCISES: CPT | Performed by: PHYSICAL THERAPIST

## 2024-06-14 PROCEDURE — 97530 THERAPEUTIC ACTIVITIES: CPT | Performed by: PHYSICAL THERAPIST

## 2024-06-14 PROCEDURE — 97140 MANUAL THERAPY 1/> REGIONS: CPT | Performed by: PHYSICAL THERAPIST

## 2024-06-14 NOTE — PROGRESS NOTES
Daily Note     Today's date: 2024  Patient name: RACHNA Pulido  : 1950  MRN: [id number]  Referring provider: Lexi Green MD  Dx:   Encounter Diagnosis     ICD-10-CM    1. Pain in right hip  M25.551       2. Trochanteric bursitis, right hip  M70.61       3. Status post hip surgery  Z98.890                      Subjective: Pt  notes having increase in back pain sx yesterday and today, limiting her WB tolerances to stand to cook, amb for community outing to [place]. Pt notes she felt very good following last PT session. She notes typical am pain in back was worse waking up yesterday am and has persisted with no improvements today noted. Pt notes she has not completed bike, TENS but has used voltaren, patches and completed gentle core ex this am.  Denies any hip or LE pain.       Objective: See treatment diary below. Modifications secondary to pt lumbar  and thoracic  pain in WB. Limited tolerance to weight shfting and any WB tasks secondary to her back pain. Addressed R Lumbar muscle hypertonicity and pain with STM in L S/L/CP and tx modifications to avoid pain aggravation. Pt completed hip  AROM declined PREs secondary to her pain aggravation. Pt educated in resuming HEP, trial of her home TENS, cont with LBE and HEP as tolerated.       Assessment: Tolerated treatment well with the modifications. Patient would benefit from continued PT but back pain sx  limting progression of  R hip, core WB task in tx and functional WB outside of tx.       Plan: Continue per plan of care.  Pt follows with Dr. Pelletier Monday. We will cont with tx plan as ordered.      Precautions: DOS: 2/15/24 (R hip trochanteric bursectomy with gluteus medius repair and IT band transfer), Lumbar surgery, R THR, HTN      Daily Treatment Diary:      Initial Evaluation Date: 24  Compliance        Visit Number 21 22 23 24 25 26       Re-Eval  Y -    -            Foto Captured Y -    -          "         5/29 5/30 6/4 6/6 6/12 6/14       Manual                    R Hip PROM nv --> -->  - -                           STM R scar, hip / lumbar soft tissue nv -->  --> - - 15m        Ther-Ex                    Upright bike -nv  7m L 1, seat 7 7m L1 seat 7 7m seat 7, L1 stopped sec to R knee p! 2 miles, seat 7 , L1  2mi seat 7, L1        SLRs H/L -nv -see below 2 x 10 cues TA, QS - 2 x 10 1# --  0x     BLE to U LE bridge with TA set -nv H/L march 2 x 15//B Bridge  to U Bridge  with TA 2 x 10 2 x 10 B, 2 x 10 WBAT RLE, 2 x 10 GTB bridge with clam shell 3\" 2 x 10 2 x 10 B, 2 x 10 RLE only, 3 x 10 x 3\" GTB bridges with clam shell 3\" 3 x 10  2x 10 B, 2 x 10 RLE only, 3 x 10 x 3\" GTB bridges with clam shell 3\" 3 x 10 -- H/L hip abd GTB 2 x 10, 5\" H/L hip Abd GTB 5\" 2x10  H/L hip abd GTB 5\" 2x10 H/L hip abd GTB 5\" 2x10   LAQ/SAQ        Seated march -nv 2x10 3# 3\"       3# 3 x 10 cues TA -->  -        March in H/L 2 x 15 -nv -- SAQ  3# 20x3\"//LAQ 3# 20x3\" SAQ 3#  3\" 20x  LAQ 3# 3\" 20x  SAQ 3# B/L 3\" 20x     SAQ 3# B/L 3\" 20x    LAQ 3# 3\" 20x   QSets St hip 3 way 2 x 10 - St 3 way B 2 x 10 UE A cues posture St 3 way R only 2# 2 x 10 cues posture St 3 way R 2#, L 0# 2x10 flex, abd, ext, SLR 2 x 10 Back  p!           ASLRS  Supine AROM 5x 2 x 10  -->  30 x -see above pre - 2 x 10 A supine Supine 2x10 AROM  supine 2x10 AROM Supine AROM 2x10 ea   Heel Slides - -  --> -- AROM 30x  - -       Clam shell MREs 10x   2 x 10 ea AA 2x10 cues 3x10 2x10 3x10  20x5\" 5\" 20x AAROM 1st 10  AROM 2nd 10  AROM 5\" 20x AROM 5\" 20x   Hip abd L S/L  L  S/L fire hydrant MRE 10x     S/L Hip abd with extended knee 10x  MRE  AA 2 x 10 ea// active fire hydrant  2 x 10 AA 2 x 10//active fire hydrant 2 x 10 3 x 10 1# fire hydrant, Hip abd straight knee cues for form 3 x 10 AA 2x10 fire 2#hydrant , hip abd straight knee AA cues for form, clam shell with OTB  3 x 10 abd and fire hydrant no resistance to AA 2 x 10 , 1# fire ydrant; hip abd 2 x 10 AA " "fatigue S/L Fire hydrant 1# R 2x10    S/L hip abd No wt 2x10 2x10       S/L fire hydrant 1# R  2x10    S/L hip abd No wts 2x10  S/L fire hydrant 1# R  2x10    S/L hip abd No wts 2x10   Reviewed and issued HEP POC , tx plan, goals and aerobic ex, SLB training HEP  -        PC modific sec to LBP         Neuro Re-Ed                    Mini squats   - -  2 x 15 cues for form Unable R knee p! - -  -       SLB floor  SLB Air Ex 3 x 15-20 floor finger tip asst 4 x 10\" RLE finger tip assist AS  4x10\" RLE finger tip A  Unable R knee p! SLB training RLE 5 x 10\", 2 sets -p!       Step ups -  - --         Step taps 4\" - - - --         Weight shifting  F/B, S to S //bars At counter 15x posture cues! 20x ea parallel bars f!  AS  30x ea WBAT RLE FWD, Lat 30x 30x ea WBAT RLE fwd, lat - 15-20 limited by back p!   //bars SS x 4 laps //bars SS x 4 laps   Ther-Act              STS     --           Amb/gait tr W and w/o - CS cane - cues stride length, pelvis, QS, GS m activ - 4 laps in //bars and 2 laps gym cues inc stride LLE glut activ RLE stance 4 laps in //bars, side step in //bars cues inc LLE stride and glut active RLE stance, posture 6laps //bars side step, cues inc LLE stride, pace, lumbar spine posture-mod cueing glut activ, quad activ 2 laps limited sec to Back p!               Side step //bars f!       Modalities                    CP R hip  CP LB R hip post tx x 10 m AS CP LB and R hip post tx 10 m CP L/S post tx x  10m CP L/S, R glut/hip  10m post tx CP R hip L/S post tx 10 m CP R hip , L/S 10 m seated post tx MHP R hip, L/S x 10 min seated MHP R hip L/S x 10 min  in S/L MHP R hip L/S x 10 min  in S/L                                                           "

## 2024-06-18 ENCOUNTER — APPOINTMENT (OUTPATIENT)
Dept: PHYSICAL THERAPY | Facility: CLINIC | Age: 74
End: 2024-06-18
Payer: MEDICARE

## 2024-06-20 ENCOUNTER — OFFICE VISIT (OUTPATIENT)
Dept: PHYSICAL THERAPY | Facility: CLINIC | Age: 74
End: 2024-06-20
Payer: MEDICARE

## 2024-06-20 DIAGNOSIS — M25.551 PAIN IN RIGHT HIP: Primary | ICD-10-CM

## 2024-06-20 DIAGNOSIS — Z98.890 STATUS POST HIP SURGERY: ICD-10-CM

## 2024-06-20 DIAGNOSIS — M70.61 TROCHANTERIC BURSITIS, RIGHT HIP: ICD-10-CM

## 2024-06-20 PROCEDURE — 97112 NEUROMUSCULAR REEDUCATION: CPT | Performed by: PHYSICAL THERAPIST

## 2024-06-20 PROCEDURE — 97530 THERAPEUTIC ACTIVITIES: CPT | Performed by: PHYSICAL THERAPIST

## 2024-06-20 PROCEDURE — 97110 THERAPEUTIC EXERCISES: CPT | Performed by: PHYSICAL THERAPIST

## 2024-06-20 NOTE — PROGRESS NOTES
Daily Note     Today's date: 2024  Patient name: RACHNA Pulido  : 1950  MRN: [id number]  Referring provider: Lexi Green MD  Dx:   Encounter Diagnosis     ICD-10-CM    1. Pain in right hip  M25.551       2. Trochanteric bursitis, right hip  M70.61       3. Status post hip surgery  Z98.890           Start Time: 1030  Stop Time: 1140  Total time in clinic (min): 70 minutes    Subjective: Pt notes she is feeling well today.  Had a lot of back pain this am and yesterday standing to cook, but improved since taking meds. Notes use of TENS unit last night to assist with back pain. Notes visit with Dr. Pelletier Monday and has been d/c from care  with MRI showing good repair fo R hip. Plans to reach out to her spine group to discuss continued back pain.       Objective: See treatment diary below. Pt tolerated increase in WB neuromuscular  tasks of step ups, weight shift LE taps, carry tasks, WBAT static stabs on RLE , 1/2 foam roll under LLE  with UE Tband pulls,   and progression of HEP today. Pt noted minimal to no back pain sx. Pt posture and positioning , body mechanics instruction completed.Pt  demonstrated difficulty with maintaining pelvis neutral for WB tasks and required Min UE A with moderate cues from PT for completion, especially when R LE fatigued. Discussed d/c plan with pt today.        Assessment: Tolerated treatment well. Patient would benefit from continued PT to achieve goals of care.  Anticipate D/c to HEP next visit. Discussed  with pt to follow with her sine specialist secondary  to her LBP which notes cont to limit her WB functional tolerances. Pt tolerated progression with WB tasks this date well with improved endurance but cont to demonstrate fatigue and dec balance on RLE single limb. Instructed pt to cont to amb with SLC  for control of gait  mechanics and to avoid pain aggravation.       Plan: Continue per plan of care.      Precautions: DOS: 2/15/24 (R hip trochanteric  "bursectomy with gluteus medius repair and IT band transfer), Lumbar surgery, R THR, HTN      Daily Treatment Diary:      Initial Evaluation Date: 03/13/24  Compliance 5/29 5/30 6/4 6/6 6/12 6/14 6/20      Visit Number 21 22 23 24 25 26 27      Re-Eval  Y -    -            Foto Captured Y -    -                  5/29 5/30 6/4 6/6 6/12 6/14 6/20      Manual                    R Hip PROM nv --> -->  - -                           STM R scar, hip / lumbar soft tissue nv -->  --> - - 15m -       Ther-Ex                    Upright bike -nv  7m L 1, seat 7 7m L1 seat 7 7m seat 7, L1 stopped sec to R knee p! 2 miles, seat 7 , L1  2mi seat 7, L1 2mi seat 7, L1 12 min       SLRs H/L -nv -see below 2 x 10 cues TA, QS - 2 x 10 1# -- - 0x     BLE to U LE bridge with TA set -nv H/L march 2 x 15//B Bridge  to U Bridge  with TA 2 x 10 2 x 10 B, 2 x 10 WBAT RLE, 2 x 10 GTB bridge with clam shell 3\" 2 x 10 2 x 10 B, 2 x 10 RLE only, 3 x 10 x 3\" GTB bridges with clam shell 3\" 3 x 10  2x 10 B, 2 x 10 RLE only, 3 x 10 x 3\" GTB bridges with clam shell 3\" 3 x 10 -- - H/L hip Abd GTB 5\" 2x10  H/L hip abd GTB 5\" 2x10 H/L hip abd GTB 5\" 2x10   LAQ/SAQ        Seated march -nv 2x10 3# 3\"       3# 3 x 10 cues TA -->  -        March in H/L 2 x 15 -nv --  SAQ 3#  3\" 20x  LAQ 3# 3\" 20x  SAQ 3# B/L 3\" 20x     SAQ 3# B/L 3\" 20x    LAQ 3# 3\" 20x   QSets St hip 3 way 2 x 10 - St 3 way B 2 x 10 UE A cues posture St 3 way R only 2# 2 x 10 cues posture St 3 way R 2#, L 0# 2x10 flex, abd, ext, SLR 2 x 10 Back  p! St 3 way with R LE 2$, L 0# 2 x 10 flex, abd, ext, SLR  2 x 10          ASLRS  Supine AROM 5x 2 x 10  -->  30 x -see above pre - - Supine 2x10 AROM  supine 2x10 AROM Supine AROM 2x10 ea   Heel Slides - -  --> -- AROM 30x  - -       Clam shell MREs 10x   2 x 10 ea AA 2x10 cues 3x10 2x10 3x10 - 5\" 20x AAROM 1st 10  AROM 2nd 10  AROM 5\" 20x AROM 5\" 20x   Hip abd L S/L  L  S/L fire hydrant MRE 10x     S/L Hip abd with extended knee 10x  MRE  AA 2 x 10 " "ea// active fire hydrant  2 x 10 AA 2 x 10//active fire hydrant 2 x 10 3 x 10 1# fire hydrant, Hip abd straight knee cues for form 3 x 10 AA 2x10 fire 2#hydrant , hip abd straight knee AA cues for form, clam shell with OTB  3 x 10 abd and fire hydrant no resistance to AA  S/L Fire hydrant 1# R 2x10    S/L hip abd No wt 2x10 2x10       S/L fire hydrant 1# R  2x10    S/L hip abd No wts 2x10  S/L fire hydrant 1# R  2x10    S/L hip abd No wts 2x10   Reviewed and issued HEP POC , tx plan, goals and aerobic ex, SLB training HEP  -        PC modific sec to LBP         Neuro Re-Ed                    Mini squats   - -  2 x 15 cues for form Unable R knee p! - -  step up f/lat 2 x 20 ea//QL hikes on step 20x ea cues form       SLB floor  SLB Air Ex 3 x 15-20 floor finger tip asst 4 x 10\" RLE finger tip assist AS  4x10\" RLE finger tip A  Unable R knee p! SLB training RLE 5 x 10\", 2 sets -p! Static stabs WBAT RLE 1/2 foam L30x F/E bias      Step ups -  - --   4\" 20x ea/      Step taps 4\" - - - --   20x 4\"//side steps // bars 6 laps      Weight shifting  F/B, S to S //bars At counter 15x posture cues! 20x ea parallel bars f!  AS  30x ea WBAT RLE FWD, Lat 30x 30x ea WBAT RLE fwd, lat - 15-20 limited by back p! Amb UKB carry, trunk carry 2x 60 ft w/rest and cues L step lenght  //bars SS x 4 laps //bars SS x 4 laps   Ther-Act              STS     --           Amb/gait tr W and w/o - CS cane - cues stride length, pelvis, QS, GS m activ - 4 laps in //bars and 2 laps gym cues inc stride LLE glut activ RLE stance 4 laps in //bars, side step in //bars cues inc LLE stride and glut active RLE stance, posture 6laps //bars side step, cues inc LLE stride, pace, lumbar spine posture-mod cueing glut activ, quad activ 2 laps limited sec to Back p! 6 laps limited by faitgue, no pain, cues postue and stride lengthL,                      Modalities                    CP R hip  CP LB R hip post tx x 10 m AS CP LB and R hip post tx 10 m CP L/S post " tx x  10m CP L/S, R glut/hip  10m post tx CP R hip L/S post tx 10 m CP R hip , L/S 10 m seated post tx MHP R hip, L/S x 10 min seated MHP R hip L/S x 10 min  in S/L MHP R hip L/S x 10 min  in S/L

## 2024-06-25 ENCOUNTER — OFFICE VISIT (OUTPATIENT)
Dept: PHYSICAL THERAPY | Facility: CLINIC | Age: 74
End: 2024-06-25
Payer: MEDICARE

## 2024-06-25 DIAGNOSIS — M25.551 PAIN IN RIGHT HIP: Primary | ICD-10-CM

## 2024-06-25 DIAGNOSIS — Z98.890 STATUS POST HIP SURGERY: ICD-10-CM

## 2024-06-25 DIAGNOSIS — M70.61 TROCHANTERIC BURSITIS, RIGHT HIP: ICD-10-CM

## 2024-06-25 PROCEDURE — 97530 THERAPEUTIC ACTIVITIES: CPT | Performed by: PHYSICAL THERAPIST

## 2024-06-25 PROCEDURE — 97110 THERAPEUTIC EXERCISES: CPT | Performed by: PHYSICAL THERAPIST

## 2024-06-25 PROCEDURE — 97112 NEUROMUSCULAR REEDUCATION: CPT | Performed by: PHYSICAL THERAPIST

## 2024-06-25 NOTE — PROGRESS NOTES
Daily Note/Discharge     Today's date: 2024  Patient name: RACHNA Pulido  : 1950  MRN: [id number]  Referring provider: Lexi Green MD  Dx:   Encounter Diagnosis     ICD-10-CM    1. Pain in right hip  M25.551       2. Trochanteric bursitis, right hip  M70.61       3. Status post hip surgery  Z98.890                      Subjective:  Pt ntoes her hip is doing very well with out pain. She did get a new lift for L shoe which does improve gait and she has been able to go without the SLC more with out more pain in her back lately. She notes most pain occurrs with standing and cooking, stationary standing.  Pt feels ready to d/c tx in PT as she feels achieved goals with R hip s/p surgical approach. She reports appointment with spine specialists at [facility] upcoming.       Objective: See treatment diary below. Final HEP instruction provided to pt with ability to demonstrate return. Pt cont to have difficulty with SLB on RLE , LE  with note of fatigue of m and some LBP provocation, L hip pain provocation reported. Pt amb with 5kg object at core 120 ft. And unilateral kettle bell carry 5# either LE with cues for posture and stride length, TA activation required. Pt did well with hold of weight RUE and tolerated inc distance of 125 ft. But had L hip pain with carry in LUE, decreased endurance of R hip abductors  with tasks, no pain.  Pt       Assessment: Tolerated treatment well. Patient LBP does limit standing tolerances as well as decrease in endurance with WB activity. She does note resolve of sx with short rest seated. Pt denies any R hip pain  with tasks and function.       Plan:     Precautions: DOS: 2/15/24 (R hip trochanteric bursectomy with gluteus medius repair and IT band transfer), Lumbar surgery, R THR, HTN      Daily Treatment Diary:      Initial Evaluation Date: 24  Compliance      Visit Number 21 22 23 24 25 26 27 28     Re-Eval  Y -    -       "      Foto Captured Y -    -     Y             5/29 5/30 6/4 6/6 6/12 6/14 6/20 6/25     Manual                    R Hip PROM nv --> -->  - -                           STM R scar, hip / lumbar soft tissue nv -->  --> - - 15m - -      Ther-Ex                    Upright bike -nv  7m L 1, seat 7 7m L1 seat 7 7m seat 7, L1 stopped sec to R knee p! 2 miles, seat 7 , L1  2mi seat 7, L1 2mi seat 7, L1 12 min 2mi seat 7, L1, 12 min      SLRs H/L -nv -see below 2 x 10 cues TA, QS - 2 x 10 1# -- - -     BLE to U LE bridge with TA set -nv H/L march 2 x 15//B Bridge  to U Bridge  with TA 2 x 10 2 x 10 B, 2 x 10 WBAT RLE, 2 x 10 GTB bridge with clam shell 3\" 2 x 10 2 x 10 B, 2 x 10 RLE only, 3 x 10 x 3\" GTB bridges with clam shell 3\" 3 x 10  2x 10 B, 2 x 10 RLE only, 3 x 10 x 3\" GTB bridges with clam shell 3\" 3 x 10 -- - -  H/L hip abd GTB 5\" 2x10 H/L hip abd GTB 5\" 2x10   LAQ/SAQ        Seated march -nv 2x10 3# 3\"       3# 3 x 10 cues TA -->  -        March in H/L 2 x 15 -nv --  -  SAQ 3# B/L 3\" 20x     SAQ 3# B/L 3\" 20x    LAQ 3# 3\" 20x   QSets St hip 3 way 2 x 10 - St 3 way B 2 x 10 UE A cues posture St 3 way R only 2# 2 x 10 cues posture St 3 way R 2#, L 0# 2x10 flex, abd, ext, SLR 2 x 10 Back  p! St 3 way with R LE 2$, L 0# 2 x 10 flex, abd, ext, SLR  2 x 10 St 3 way, B LE s, 0# 2 x 10 flex, ext, abd, march, SLR  2 x 10 U HR A         ASLRS  Supine AROM 5x 2 x 10  -->  30 x -see above pre - - -  supine 2x10 AROM Supine AROM 2x10 ea   Heel Slides - -  --> -- AROM 30x  - -       Clam shell MREs 10x   2 x 10 ea AA 2x10 cues 3x10 2x10 3x10 - -  AROM 5\" 20x AROM 5\" 20x   Hip abd L S/L  L  S/L fire hydrant MRE 10x     S/L Hip abd with extended knee 10x  MRE  AA 2 x 10 ea// active fire hydrant  2 x 10 AA 2 x 10//active fire hydrant 2 x 10 3 x 10 1# fire hydrant, Hip abd straight knee cues for form 3 x 10 AA 2x10 fire 2#hydrant , hip abd straight knee AA cues for form, clam shell with OTB  3 x 10 abd and fire hydrant no resistance " "to AA  -      S/L fire hydrant 1# R  2x10    S/L hip abd No wts 2x10  S/L fire hydrant 1# R  2x10    S/L hip abd No wts 2x10   Reviewed and issued HEP POC , tx plan, goals and aerobic ex, SLB training HEP  -        PC modific sec to LBP         Neuro Re-Ed                    Mini squats   - -  2 x 15 cues for form Unable R knee p! - -  step up f/lat 2 x 20 ea//QL hikes on step 20x ea cues form       SLB floor  SLB Air Ex 3 x 15-20 floor finger tip asst 4 x 10\" RLE finger tip assist AS  4x10\" RLE finger tip A  Unable R knee p! SLB training RLE 5 x 10\", 2 sets -p! Static stabs WBAT RLE 1/2 foam L30x F/E bias Static stabs WBAT RLE 1/2 foal roll 30x f/e bias     Step ups -  - --   4\" 20x ea/ -     Step taps 4\" - - - --   20x 4\"//side steps // bars 6 laps 20x4\"//side steps UE A 8 laps     Weight shifting  F/B, S to S //bars At counter 15x posture cues! 20x ea parallel bars f!  AS  30x ea WBAT RLE FWD, Lat 30x 30x ea WBAT RLE fwd, lat - 15-20 limited by back p! Amb UKB carry, trunk carry 2x 60 ft w/rest and cues L step lenght Amb with 5kg, amb with U UE Kbell carry //bars SS x 4 laps //bars SS x 4 laps   Ther-Act              STS     --           Amb/gait tr W and w/o - CS cane - cues stride length, pelvis, QS, GS m activ - 4 laps in //bars and 2 laps gym cues inc stride LLE glut activ RLE stance 4 laps in //bars, side step in //bars cues inc LLE stride and glut active RLE stance, posture 6laps //bars side step, cues inc LLE stride, pace, lumbar spine posture-mod cueing glut activ, quad activ 2 laps limited sec to Back p! 6 laps limited by faitgue, no pain, cues postue and stride lengthL,  6 laps limited by fatigue, no pain cues or posture and stride length              Final HEP, pt educ, self mgmts      Modalities                    CP R hip  CP LB R hip post tx x 10 m AS CP LB and R hip post tx 10 m CP L/S post tx x  10m CP L/S, R glut/hip  10m post tx CP R hip L/S post tx 10 m CP R hip , L/S 10 m seated post tx  MHP R " "hip L/S x 10 min  in S/L MHP R hip L/S x 10 min  in S/L                                        Access Code: [email or web address]  URL: [email or web address]  Date: 06/25/2024  Prepared by: Charito Nunn    Exercises  - Seated Gluteal Sets  - 1 x daily - 7 x weekly - 2 sets - 10 reps - 3\" hold  - Seated Long Arc Quad  - 1 x daily - 7 x weekly - 2 sets - 10 reps - 3\" hold  - Supine Bridge  - 1 x daily - 7 x weekly - 3 sets - 10 reps - 3 hold  - Single Leg Bridge  - 1 x daily - 7 x weekly - 3 sets - 10 reps - 2 hold  - Clamshell with Resistance  - 1 x daily - 7 x weekly - 3 sets - 10 reps - 3 hold  - Sidelying Hip Abduction  - 1 x daily - 7 x weekly - 1-2 sets - 10 reps - 2 hold  - Standing Hip Abduction with Unilateral Counter Support  - 1 x daily - 7 x weekly - 3 sets - 10 reps  - Sidelying Bent Knee Lift at 45 Degrees  - 1 x daily - 7 x weekly - 3 sets - 10 reps - 2 hold  - Heel Toe Raises with Unilateral Counter Support  - 1 x daily - 7 x weekly - 3 sets - 10 reps - 2 hold  - Standing March with Unilateral Counter Support  - 1 x daily - 7 x weekly - 3 sets - 10 reps                       "

## 2024-06-27 ENCOUNTER — APPOINTMENT (OUTPATIENT)
Dept: PHYSICAL THERAPY | Facility: CLINIC | Age: 74
End: 2024-06-27
Payer: MEDICARE

## 2024-12-02 ENCOUNTER — APPOINTMENT (OUTPATIENT)
Dept: PHYSICAL THERAPY | Facility: CLINIC | Age: 74
End: 2024-12-02
Payer: MEDICARE

## 2024-12-02 NOTE — PROGRESS NOTES
PT Evaluation     Today's date: 2024  Patient name: RACHNA Pulido  : 1950  MRN: [id number]  Referring provider: Nilo Ramos MD  Dx: No diagnosis found.               Assessment/Plan    Subjective    Objective         Precautions: spine surgery, R THR hx, glut m tear, repair R     Daily Treatment Diary:      Initial Evaluation Date: 24  Compliance                      Visit Number                     Re-Eval  IE                 MC   Foto Captured                                                 Manual                                                                                        Ther-Ex                                                                                                                                                                                                                                                  Neuro Re-Ed                                                                                                Ther-Act                                                               Modalities

## 2024-12-03 ENCOUNTER — EVALUATION (OUTPATIENT)
Dept: PHYSICAL THERAPY | Facility: CLINIC | Age: 74
End: 2024-12-03
Payer: MEDICARE

## 2024-12-03 VITALS — HEART RATE: 85 BPM | OXYGEN SATURATION: 98 % | SYSTOLIC BLOOD PRESSURE: 138 MMHG | DIASTOLIC BLOOD PRESSURE: 87 MMHG

## 2024-12-03 DIAGNOSIS — R26.9 GAIT DIFFICULTY: ICD-10-CM

## 2024-12-03 DIAGNOSIS — M16.12 ARTHRITIS OF LEFT HIP: Primary | ICD-10-CM

## 2024-12-03 PROCEDURE — 97530 THERAPEUTIC ACTIVITIES: CPT | Performed by: PHYSICAL THERAPIST

## 2024-12-03 PROCEDURE — 97162 PT EVAL MOD COMPLEX 30 MIN: CPT | Performed by: PHYSICAL THERAPIST

## 2024-12-03 NOTE — LETTER
2024    Nilo Ramos MD  [address]  [facility]     Patient: RACHNA Pulido   YOB: 1950   Date of Visit: 12/3/2024     Encounter Diagnosis     ICD-10-CM    1. Arthritis of left hip  M16.12       2. Gait difficulty  R26.9           Dear Dr. Ramos:    Thank you for your recent referral of RAHCNA Pulido. Please review the attached evaluation summary from A Porsche's recent visit.     Please verify that you agree with the plan of care by signing the attached order.     If you have any questions or concerns, please do not hesitate to call.     I sincerely appreciate the opportunity to share in the care of one of your patients and hope to have another opportunity to work with you in the near future.       Sincerely,    Charito Nunn, PT      Referring Provider:      I certify that I have read the below Plan of Care and certify the need for these services furnished under this plan of treatment while under my care.                    Nilo Ramos MD  [address]  [facility]   Via Fax: [phone]          PT Evaluation     Today's date: 12/3/2024  Patient name: RACHNA Pulido  : 1950  MRN: [id number]  Referring provider: Nilo Ramos MD  Dx:   Encounter Diagnosis     ICD-10-CM    1. Arthritis of left hip  M16.12       2. Gait difficulty  R26.9           Start Time: 0848  Stop Time: 0935  Total time in clinic (min): 47 minutes    Assessment  Impairments: abnormal gait, abnormal or restricted ROM, lacks appropriate home exercise program, poor posture  and endurance  Other impairment: transfer supine to to sidelying reports increase in L hip/groin pain sx and is slow to complete  Symptom irritability: high    Assessment details: 74 year old female know to me from previous care for R hip gluteal repair earlier this year returning for care for L hip arthritis , pain and difficulty with ambulation. She presents with deficits of ROM,  strength, bed mobility and transfers, testing indicates increase fall risk, ambulation difficulty with gait dysfunction secondary to back extension rom loss and L hip flexion soft tissue and/or joint mobility deficits into extension secondary to arthritis. Above deficits impacting ADLs, self care, amb on levels/stairs, transfers and sleep tolerances. Pt notes she has become more sedentary secondary to her L hip pain.  Pt educated in fall risk, fall reduction strategies, fall plan, tx plan and goals. Pt educated nature of arthritis may impact ability to achieve goals of care.    Barriers to therapy: Potential LBP aggravation if should occur may hinder ability to complete PT rehabilitation to achieve goals for L hip  Understanding of Dx/Px/POC: excellent     Prognosis: fair  Prognosis details: Nature of arthritis of L hip may impact ability to achieve rehabilitation goals of care    Goals  Goals  STG   2-3 weeks     Patient to develop  independence with HEP to assist with goal achievements.  Pt to develop L hip AROM increase by 5 deg. Extension , abduction and flexion  Pt to develop L hip by 1/4 mmt grade for ext, flex and abd  Pt to note a dec tenderness to palpation of L hip soft tissue  Improve 5 x sit to stand to indicate decrease fall risk  Increase SLB LLE by 5-10 seconds   Decrease pain awakenings to 3 or less secondary to L hip    LTG  4-6  weeks    Pt to develop L hip ROM  100 deg, flex, 25 deg abd, 0 deg extension to amb with improved posture and gait mechanics      Pt to develop L hip Strength  4+/5 to amb on stairs with reciprocal gait and HR A with no difficulty   Pt to develop LLE SLB to 20 seconds or greater to improve gait mechanics for amb in home and community outings  Pt I with use of AD to become I with don/doff socks and shoes  Pt to develop improved strength and flexibility of L hip to transfer supine to side lying with no to little difficulty secondary to L hip   Pt to report improved sleep   posture tolerance for no to minimal pain awakenings    Plan  Patient would benefit from: skilled physical therapy  Planned modality interventions: thermotherapy: hydrocollator packs    Planned therapy interventions: abdominal trunk stabilization, joint mobilization, manual therapy, neuromuscular re-education, patient/caregiver education, gait training, strengthening, stretching, therapeutic activities, therapeutic exercise, graded exercise and home exercise program    Frequency: 2x week (1-2 x per week)  Plan of Care beginning date: 12/3/2024  Plan of Care expiration date: 2025  Treatment plan discussed with: patient  Plan details: Evaluation performed today         Subjective Evaluation    History of Present Illness  Mechanism of injury: Long hx of L hip pain and known arthritis which has become more painful and limiting with transfers and walking, standing over the past few months.   Pt sought care from Dr. Ramso and THR recommended.  She has grab bar in shower, shower chair, hand held shower head,  high raised toilet seats. Pt notes she is unable to reach to L foot for donning socks and shoes and  completes.  Self reported sedentary activity level secondary to L hip pain, at times her back pain. Pt notes using step to gait on stairs, difficulty with transfers and bed mobility secondary to L hip pain.     Pt notes chronic back pain sx currently treated under care of physician at [facility].  Quality of life: good    Patient Goals  Patient goals for therapy: decreased pain and increased strength  Patient goal: to return to walking with SLC without difficulty for community outings, family events, standing for cooking  Pain  Current pain ratin  At best pain ratin  At worst pain ratin  Location: post, laterl hip and groin without radiation  Quality: sharp, discomfort and dull ache  Relieving factors: rest (sitting)  Aggravating factors: walking, standing, stair climbing and lifting (sleep  posturing, turning in bed, sit to stand)  Progression: worsening    Social Support  Stairs in house: yes   Lives in: multiple-level home  Lives with: spouse    Hand dominance: right      Diagnostic Tests  X-ray: abnormal (advanced arthritis L hip femur and acetabulum)  Treatments  Previous treatment: injection treatment        Objective     Observations   Left Hip  Negative for deformity.     Palpation   Left   Tenderness of the gluteus medius, iliopsoas and lumbar paraspinals.     Tenderness     Left Hip   Tenderness in the PSIS and greater trochanter.     Neurological Testing     Sensation     Hip   Left Hip   Intact: light touch    Right Hip   Intact: light touch    Active Range of Motion   Left Hip   Flexion: 95 degrees with pain  Extension: Left hip active extension: lacks 10 from 0. with pain  Abduction: 18 degrees with pain  Adduction: 0 degrees with pain  External rotation (90/90): 25 degrees with pain  Internal rotation (90/90): 10 degrees with pain    Right Hip   Flexion: WFL  Extension: 5 degrees   Abduction: WFL  External rotation (90/90): 30 degrees     Additional Active Range of Motion Details  Lumbar extension lacks neutral extension, unable to assume - reports pain in central to right sided lumbar spine with arom into extension. Also reports pain in L anterolateral hip with lumbar extension arom.     Passive Range of Motion   Left Hip   Flexion: 95 degrees with pain  Extension: Left hip passive extension: lacks 10 from 0. with pain  Abduction: 20 degrees with pain  Adduction: 0 degrees with pain  External rotation (90/90): 28 degrees with pain  Internal rotation (90/90): 10 degrees with pain    Strength/Myotome Testing     Left Hip   Planes of Motion   Flexion: 4- (pain)  Extension: 4  Abduction: 4  Adduction: 4  External rotation: 4  Internal rotation: 4    Right Hip   Planes of Motion   Flexion: 4  Extension: 4+  Abduction: 4  Adduction: 4+  External rotation: 4+  Internal rotation: 4    Tests      Left Hip   Positive PETRONA, FADIR and scour.     Ambulation     Observational Gait   Decreased walking speed, stride length and left stance time.   Left foot contact pattern: heel to toe  Right foot contact pattern: heel to toe  Left arm swing: decreased  Right arm swing: decreased  Base of support: normal    Additional Observational Gait Details  Pt amb with SLC RUE wth trunk and cervical spine in flexion, no deviation from path observed  Pt amb short distance in tx with CS  without AD demonstrating antalgic gait with dec stance LLE, increase in trunk flexion, rigid trunk and no arm swing-    Functional Assessment        Comments  5 x sit to stand able to complete without UE A 18 seconds, noted L hip pain with task completion    General Comments:      Hip Comments   SLB either LE 3-5 seconds with inc pain reported L groin when completing on LLE. Unable to posture in neutral lumbar spine, maintains flexed posture.            Precautions: spine surgery, R THR hx, glut m  repair R, gait dysfunction, fall risk     Daily Treatment Diary:      Initial Evaluation Date: 12/03/24  Compliance 12/3                     Visit Number 1                    Re-Eval  IE                    Foto Captured                            12/3                     Manual                      L hip long axis distraction, lat distrxn, post glides                      PROM L hip                                            Ther-Ex                      QS, GS                      BKFOs                      IR/ER rolls                      Abd slides supine                      SAQ/LAQ                      Hip 3 way standing                      TR/HR                      bridges                      LTR painfree                      Nu step                      Neuro Re-Ed                      Mini squats                      Weight shifting             SLB training             Sit to stands big reach                          Ther-Act               transfers/bed mobility Us eof AD, fall education, PC                                                Modalities                      HP/CP

## 2024-12-03 NOTE — PROGRESS NOTES
PT Evaluation     Today's date: 12/3/2024  Patient name: RACHNA Pulido  : 1950  MRN: [id number]  Referring provider: Nilo Ramos MD  Dx:   Encounter Diagnosis     ICD-10-CM    1. Arthritis of left hip  M16.12       2. Gait difficulty  R26.9           Start Time: 0848  Stop Time: 0935  Total time in clinic (min): 47 minutes    Assessment  Impairments: abnormal gait, abnormal or restricted ROM, lacks appropriate home exercise program, poor posture  and endurance  Other impairment: transfer supine to to sidelying reports increase in L hip/groin pain sx and is slow to complete  Symptom irritability: high    Assessment details: 74 year old female know to me from previous care for R hip gluteal repair earlier this year returning for care for L hip arthritis , pain and difficulty with ambulation. She presents with deficits of ROM, strength, bed mobility and transfers, testing indicates increase fall risk, ambulation difficulty with gait dysfunction secondary to back extension rom loss and L hip flexion soft tissue and/or joint mobility deficits into extension secondary to arthritis. Above deficits impacting ADLs, self care, amb on levels/stairs, transfers and sleep tolerances. Pt notes she has become more sedentary secondary to her L hip pain.  Pt educated in fall risk, fall reduction strategies, fall plan, tx plan and goals. Pt educated nature of arthritis may impact ability to achieve goals of care.    Barriers to therapy: Potential LBP aggravation if should occur may hinder ability to complete PT rehabilitation to achieve goals for L hip  Understanding of Dx/Px/POC: excellent     Prognosis: fair  Prognosis details: Nature of arthritis of L hip may impact ability to achieve rehabilitation goals of care    Goals  Goals  STG   2-3 weeks     Patient to develop  independence with HEP to assist with goal achievements.  Pt to develop L hip AROM increase by 5 deg. Extension , abduction and flexion  Pt to develop  L hip by 1/4 mmt grade for ext, flex and abd  Pt to note a dec tenderness to palpation of L hip soft tissue  Improve 5 x sit to stand to indicate decrease fall risk  Increase SLB LLE by 5-10 seconds   Decrease pain awakenings to 3 or less secondary to L hip    LTG  4-6  weeks    Pt to develop L hip ROM  100 deg, flex, 25 deg abd, 0 deg extension to amb with improved posture and gait mechanics      Pt to develop L hip Strength  4+/5 to amb on stairs with reciprocal gait and HR A with no difficulty   Pt to develop LLE SLB to 20 seconds or greater to improve gait mechanics for amb in home and community outings  Pt I with use of AD to become I with don/doff socks and shoes  Pt to develop improved strength and flexibility of L hip to transfer supine to side lying with no to little difficulty secondary to L hip   Pt to report improved sleep  posture tolerance for no to minimal pain awakenings    Plan  Patient would benefit from: skilled physical therapy  Planned modality interventions: thermotherapy: hydrocollator packs    Planned therapy interventions: abdominal trunk stabilization, joint mobilization, manual therapy, neuromuscular re-education, patient/caregiver education, gait training, strengthening, stretching, therapeutic activities, therapeutic exercise, graded exercise and home exercise program    Frequency: 2x week (1-2 x per week)  Plan of Care beginning date: 12/3/2024  Plan of Care expiration date: 1/14/2025  Treatment plan discussed with: patient  Plan details: Evaluation performed today         Subjective Evaluation    History of Present Illness  Mechanism of injury: Long hx of L hip pain and known arthritis which has become more painful and limiting with transfers and walking, standing over the past few months.   Pt sought care from Dr. Ramos and THR recommended.  She has grab bar in shower, shower chair, hand held shower head,  high raised toilet seats. Pt notes she is unable to reach to L foot for  donning socks and shoes and  completes.  Self reported sedentary activity level secondary to L hip pain, at times her back pain. Pt notes using step to gait on stairs, difficulty with transfers and bed mobility secondary to L hip pain.     Pt notes chronic back pain sx currently treated under care of physician at [facility].  Quality of life: good    Patient Goals  Patient goals for therapy: decreased pain and increased strength  Patient goal: to return to walking with SLC without difficulty for community outings, family events, standing for cooking  Pain  Current pain ratin  At best pain ratin  At worst pain ratin  Location: post, laterl hip and groin without radiation  Quality: sharp, discomfort and dull ache  Relieving factors: rest (sitting)  Aggravating factors: walking, standing, stair climbing and lifting (sleep posturing, turning in bed, sit to stand)  Progression: worsening    Social Support  Stairs in house: yes   Lives in: multiple-level home  Lives with: spouse    Hand dominance: right      Diagnostic Tests  X-ray: abnormal (advanced arthritis L hip femur and acetabulum)  Treatments  Previous treatment: injection treatment        Objective     Observations   Left Hip  Negative for deformity.     Palpation   Left   Tenderness of the gluteus medius, iliopsoas and lumbar paraspinals.     Tenderness     Left Hip   Tenderness in the PSIS and greater trochanter.     Neurological Testing     Sensation     Hip   Left Hip   Intact: light touch    Right Hip   Intact: light touch    Active Range of Motion   Left Hip   Flexion: 95 degrees with pain  Extension: Left hip active extension: lacks 10 from 0. with pain  Abduction: 18 degrees with pain  Adduction: 0 degrees with pain  External rotation (90/90): 25 degrees with pain  Internal rotation (90/90): 10 degrees with pain    Right Hip   Flexion: WFL  Extension: 5 degrees   Abduction: WFL  External rotation (90/90): 30 degrees     Additional  Active Range of Motion Details  Lumbar extension lacks neutral extension, unable to assume - reports pain in central to right sided lumbar spine with arom into extension. Also reports pain in L anterolateral hip with lumbar extension arom.     Passive Range of Motion   Left Hip   Flexion: 95 degrees with pain  Extension: Left hip passive extension: lacks 10 from 0. with pain  Abduction: 20 degrees with pain  Adduction: 0 degrees with pain  External rotation (90/90): 28 degrees with pain  Internal rotation (90/90): 10 degrees with pain    Strength/Myotome Testing     Left Hip   Planes of Motion   Flexion: 4- (pain)  Extension: 4  Abduction: 4  Adduction: 4  External rotation: 4  Internal rotation: 4    Right Hip   Planes of Motion   Flexion: 4  Extension: 4+  Abduction: 4  Adduction: 4+  External rotation: 4+  Internal rotation: 4    Tests     Left Hip   Positive PETRONA, FADIR and scour.     Ambulation     Observational Gait   Decreased walking speed, stride length and left stance time.   Left foot contact pattern: heel to toe  Right foot contact pattern: heel to toe  Left arm swing: decreased  Right arm swing: decreased  Base of support: normal    Additional Observational Gait Details  Pt amb with SLC RUE wth trunk and cervical spine in flexion, no deviation from path observed  Pt amb short distance in tx with CS  without AD demonstrating antalgic gait with dec stance LLE, increase in trunk flexion, rigid trunk and no arm swing-    Functional Assessment        Comments  5 x sit to stand able to complete without UE A 18 seconds, noted L hip pain with task completion    General Comments:      Hip Comments   SLB either LE 3-5 seconds with inc pain reported L groin when completing on LLE. Unable to posture in neutral lumbar spine, maintains flexed posture.            Precautions: spine surgery, R THR hx, glut m  repair R, gait dysfunction, fall risk     Daily Treatment Diary:      Initial Evaluation Date:  12/03/24  Compliance 12/3                     Visit Number 1                    Re-Eval  IE                 MC   Foto Captured                            12/3                     Manual                      L hip long axis distraction, lat distrxn, post glides                      PROM L hip                                            Ther-Ex                      QS, GS                      BKFOs                      IR/ER rolls                      Abd slides supine                      SAQ/LAQ                      Hip 3 way standing                      TR/HR                      bridges                      LTR painfree                      Nu step                      Neuro Re-Ed                      Mini squats                      Weight shifting             SLB training             Sit to stands big reach                          Ther-Act              transfers/bed mobility Us eof AD, fall education, PC                                                Modalities                      HP/CP

## 2024-12-06 ENCOUNTER — OFFICE VISIT (OUTPATIENT)
Dept: PHYSICAL THERAPY | Facility: CLINIC | Age: 74
End: 2024-12-06
Payer: MEDICARE

## 2024-12-06 DIAGNOSIS — M16.12 ARTHRITIS OF LEFT HIP: Primary | ICD-10-CM

## 2024-12-06 DIAGNOSIS — R26.9 GAIT DIFFICULTY: ICD-10-CM

## 2024-12-06 PROCEDURE — 97140 MANUAL THERAPY 1/> REGIONS: CPT | Performed by: PHYSICAL THERAPIST

## 2024-12-06 PROCEDURE — 97110 THERAPEUTIC EXERCISES: CPT | Performed by: PHYSICAL THERAPIST

## 2024-12-06 NOTE — PROGRESS NOTES
"Daily Note     Today's date: 2024  Patient name: RACHNA Pulido  : 1950  MRN: [id number]  Referring provider: Nilo Ramos MD  Dx:   Encounter Diagnosis     ICD-10-CM    1. Arthritis of left hip  M16.12       2. Gait difficulty  R26.9                      Subjective: Pt notes she is very stiff with pain in L hip and R lower back/buttock this am.       Objective: See treatment diary below      Assessment: Pt arrived to care with SLC with trunk flexed posturing. Warm up on nustep completed with good tolerance. She Tolerated treatment well. Patient would benefit from continued PT to maximize pre op ROM and strength, functional mobility L hip.       Plan: Continue per plan of care.      Precautions: spine surgery, R THR hx, glut m  repair R, gait dysfunction, fall risk     Daily Treatment Diary:      Initial Evaluation Date: 24  Compliance 12/3  12/6                   Visit Number 1 2                   Re-Eval  IE                 MC   Foto Captured                            12/3  12/6                   Manual                      L hip long axis distraction, lat distrxn, post glides   PC                   PROM L hip   flex, abd, ER, ext  8min                                         Ther-Ex                      QS, GS   10x10\" h/l  5x5\" st                   BKFOs   10x10\"                   IR/ER rolls   -                   Abd slides supine   10x AA                   SAQ/LAQ   3# s, 0# l 2 x 15, 3\"                   Hip 3 way standing   nc                   TR/HR   nc                   bridges   hip ext iso                   LTR painfree                      Nu step                      Neuro Re-Ed                      Mini squats                      Weight shifting             SLB training             Sit to stands big reach                          Ther-Act              transfers/bed mobility Us eof AD, fall education, PC                                                Modalities               "        HP/CP

## 2024-12-09 ENCOUNTER — OFFICE VISIT (OUTPATIENT)
Dept: PHYSICAL THERAPY | Facility: CLINIC | Age: 74
End: 2024-12-09
Payer: MEDICARE

## 2024-12-09 DIAGNOSIS — R26.9 GAIT DIFFICULTY: ICD-10-CM

## 2024-12-09 DIAGNOSIS — M16.12 ARTHRITIS OF LEFT HIP: Primary | ICD-10-CM

## 2024-12-09 PROCEDURE — 97140 MANUAL THERAPY 1/> REGIONS: CPT | Performed by: PHYSICAL THERAPIST

## 2024-12-09 PROCEDURE — 97110 THERAPEUTIC EXERCISES: CPT | Performed by: PHYSICAL THERAPIST

## 2024-12-09 NOTE — PROGRESS NOTES
Daily Note     Today's date: 2024  Patient name: RACHNA Pulido  : 1950  MRN: [id number]  Referring provider: Nilo Ramos MD  Dx:   Encounter Diagnosis     ICD-10-CM    1. Arthritis of left hip  M16.12       2. Gait difficulty  R26.9                      Subjective:  Pt noted good tolerance to last visit and HEP completed over weekend. She notes L post hip pain limits her ability to walk on levels and stairs and to transfer well. Cont to use SLC at all times.  A for dressing and grooming L LE.       Objective: See treatment diary below. Pt tolerated TE progression with standing ex. She did require cueing visually and verbally for upright posturing. Pt did fatigue with attempts at upright during TE and required seated rests.       Assessment: Tolerated treatment well with fatigue and L hip pain as limiting with WB ex.  Patient would benefit from continued PT, skilled care to increase  ROM, strength functional WB mobility.  Hip pain along with back/R hip hx may be impacting ability to fully achieve goals of care. Pt does present with slow bashir , dec  to absent trunk rot and UE swing R > L UE. Pt amb with flexed trunk posturing but is able to improve posture with B UE A. Pt educated in use of walker for improved gait mechanics including inc stride length and upright posturing.       Plan: Continue per plan of care.      Precautions: spine surgery, R THR hx, glut m  repair R, gait dysfunction, fall risk     Daily Treatment Diary:      Initial Evaluation Date: 24  Compliance 12/3  12/6  12/9                 Visit Number 1 2  3                 Re-Eval  IE                 MC   Foto Captured                            12/3  12/6  12/9                 Manual                      L hip long axis distraction, lat distrxn, post glides   PC  PC                 PROM L hip   flex, abd, ER, ext  8min  flex, abd, ER, ext 8 m                                       Ther-Ex                      QS, GS  "  10x10\" h/l  5x5\" st  10x10\" h/l                  BKFOs   10x10\"  10x10\"                 IR/ER rolls   -                   Abd slides supine   10x AA  10x AA                 SAQ/LAQ   3# s, 0# l 2 x 15, 3\"  3# s, 0# l  2 x 15, 3\"                 Hip 3 way standing   nc  15x                 TR/HR   nc  15x                 bridges   hip ext iso  hip ext iso 15x5\"                 LTR painfree     20x                 Nu step     10m L3                 Neuro Re-Ed                      Mini squats                      Weight shifting             SLB training             Sit to stands big reach                          Ther-Act              transfers/bed mobility Us eof AD, fall education, PC                                                Modalities                      HP/CP                                                                "

## 2024-12-12 ENCOUNTER — OFFICE VISIT (OUTPATIENT)
Dept: PHYSICAL THERAPY | Facility: CLINIC | Age: 74
End: 2024-12-12
Payer: MEDICARE

## 2024-12-12 DIAGNOSIS — R26.9 GAIT DIFFICULTY: ICD-10-CM

## 2024-12-12 DIAGNOSIS — M16.12 ARTHRITIS OF LEFT HIP: Primary | ICD-10-CM

## 2024-12-12 PROCEDURE — 97140 MANUAL THERAPY 1/> REGIONS: CPT | Performed by: PHYSICAL THERAPIST

## 2024-12-12 PROCEDURE — 97110 THERAPEUTIC EXERCISES: CPT | Performed by: PHYSICAL THERAPIST

## 2024-12-12 NOTE — PROGRESS NOTES
"Daily Note     Today's date: 2024  Patient name: RACHNA Pulido  : 1950  MRN: [id number]  Referring provider: Nilo Ramos MD  Dx:   Encounter Diagnosis     ICD-10-CM    1. Arthritis of left hip  M16.12       2. Gait difficulty  R26.9                      Subjective: Pt notes Pain in L hip persists and limits standing , walking tolerances. Pt notes good tolerance to last tx.       Objective: See treatment diary below      Assessment: Tolerated treatment well., demonstrating improved posturing with standing ex. L hip pain limiting tolerances but noted some LBP and R buttock pain with longer duration of standing on RLE. Patient demonstrated fatigue post treatment and would benefit from continued PT.      Plan: Continue per plan of care.      Precautions: spine surgery, R THR hx, glut m  repair R, gait dysfunction, fall risk     Daily Treatment Diary:      Initial Evaluation Date: 24  Compliance 12/3  12/6  12/9  12/12               Visit Number 1 2  3  4               Re-Eval  IE                 MC   Foto Captured                            12/3  12/6  12/9  12/12               Manual                      L hip long axis distraction, lat distrxn, post glides   PC  PC  PC               PROM L hip   flex, abd, ER, ext  8min  flex, abd, ER, ext 8 m  flex, abd, ER, ext 8m                                     Ther-Ex                      QS, GS   10x10\" h/l  5x5\" st  10x10\" h/l   10x10\" h/l               BKFOs   10x10\"  10x10\"  10x10\"               IR/ER rolls   -                   Abd slides supine   10x AA  10x AA  10x AA               SAQ/LAQ   3# s, 0# l 2 x 15, 3\"  3# s, 0# l  2 x 15, 3\"  3# s, 0# L 2 x 15, 3\"               Hip 3 way standing   nc  15x  15x               TR/HR   nc  15x  15x               bridges   hip ext iso  hip ext iso 15x5\"  hip iso ext 15x5\", abd 15x5\"               LTR painfree     20x  20x               Nu step     10m L3  10m L3               Neuro Re-Ed               "        Mini squats                      Weight shifting             SLB training             Sit to stands big reach                          Ther-Act              transfers/bed mobility Us eof AD, fall education, PC                                                Modalities                      HP/CP

## 2024-12-16 ENCOUNTER — OFFICE VISIT (OUTPATIENT)
Dept: PHYSICAL THERAPY | Facility: CLINIC | Age: 74
End: 2024-12-16
Payer: MEDICARE

## 2024-12-16 DIAGNOSIS — M16.12 ARTHRITIS OF LEFT HIP: Primary | ICD-10-CM

## 2024-12-16 DIAGNOSIS — R26.9 GAIT DIFFICULTY: ICD-10-CM

## 2024-12-16 PROCEDURE — 97140 MANUAL THERAPY 1/> REGIONS: CPT

## 2024-12-16 PROCEDURE — 97110 THERAPEUTIC EXERCISES: CPT

## 2024-12-16 NOTE — PROGRESS NOTES
"Daily Note     Today's date: 2024  Patient name: RACHNA Pulido  : 1950  MRN: [id number]  Referring provider: Nilo Ramos MD  Dx:   Encounter Diagnosis     ICD-10-CM    1. Arthritis of left hip  M16.12       2. Gait difficulty  R26.9                      Subjective: Pt reports that she is doing well today but slow. Notes that the mornings she is stiff and painful in her L hip.       Objective: See treatment diary below      Assessment: Tolerated treatment well. Began on the nu step as an active warmup. A tight end feel was present in all hip directions this session with slight improvements made in range post manuals. Glute and quad weakness noted with table top exercises. Patient demonstrated fatigue post treatment and would benefit from continued PT.      Plan: Continue per plan of care.      Precautions: spine surgery, R THR hx, glut m  repair R, gait dysfunction, fall risk     Daily Treatment Diary:      Initial Evaluation Date: 24  Compliance 12/3  12/6  12/9  12/12  12/16             Visit Number 1 2  3  4  5             Re-Eval  IE                 MC   Foto Captured                            12/3  12/6  12/9  12/12  12/16             Manual                      L hip long axis distraction, lat distrxn, post glides   PC  PC  PC  MM             PROM L hip   flex, abd, ER, ext  8min  flex, abd, ER, ext 8 m  flex, abd, ER, ext 8m   flex, abd, ER, ext 8m                                   Ther-Ex                      QS, GS   10x10\" h/l  5x5\" st  10x10\" h/l   10x10\" h/l  10x10\" h/l             BKFOs   10x10\"  10x10\"  10x10\"  10x10\"             IR/ER rolls   -                   Abd slides supine   10x AA  10x AA  10x AA  10x AA             SAQ/LAQ   3# s, 0# l 2 x 15, 3\"  3# s, 0# l  2 x 15, 3\"  3# s, 0# L 2 x 15, 3\"  3# s, 0# L 2 x 15, 3\"             Hip 3 way standing   nc  15x  15x  15x             TR/HR   nc  15x  15x  15x             bridges   hip ext iso  hip ext iso 15x5\"  hip iso " "ext 15x5\", abd 15x5\"   hip iso ext 15x5\", abd 15x5\"             LTR painfree     20x  20x  20x             Nu step     10m L3  10m L3  10m L3             Neuro Re-Ed                      Mini squats                      Weight shifting             SLB training             Sit to stands big reach                          Ther-Act              transfers/bed mobility Us eof AD, fall education, PC                                                Modalities                      HP/CP                                                                  "

## 2024-12-19 ENCOUNTER — OFFICE VISIT (OUTPATIENT)
Dept: PHYSICAL THERAPY | Facility: CLINIC | Age: 74
End: 2024-12-19
Payer: MEDICARE

## 2024-12-19 DIAGNOSIS — R26.9 GAIT DIFFICULTY: ICD-10-CM

## 2024-12-19 DIAGNOSIS — M16.12 ARTHRITIS OF LEFT HIP: Primary | ICD-10-CM

## 2024-12-19 PROCEDURE — 97140 MANUAL THERAPY 1/> REGIONS: CPT | Performed by: PHYSICAL THERAPIST

## 2024-12-19 PROCEDURE — 97110 THERAPEUTIC EXERCISES: CPT | Performed by: PHYSICAL THERAPIST

## 2024-12-19 NOTE — PROGRESS NOTES
Daily Note     Today's date: 2024  Patient name: RACHNA Pulido  : 1950  MRN: [id number]  Referring provider: Nilo Ramos MD  Dx:   Encounter Diagnosis     ICD-10-CM    1. Arthritis of left hip  M16.12       2. Gait difficulty  R26.9                      Subjective:  A lot of pain in back using tramadol which helped her back but did not help her hip pain.   Notes am is the worst time of day secondary to her back pain. Notes L hip pain is worse  with standing and walking. Cont to use her SLC for amb. Left KULWANT scheduled for 2024. Notes compliance with her HEP but spaces exercises out through out the day.       Objective: See treatment diary below. Pt moving slowly with amb and transfers today, using UE s to lift LLE onto tx table and onto nustep. Pt L hip ROM decreased with pain for flexion , abduction, IR and extension and stiff/guarded end feel. Pt progressed to blue theraband hip abd isometrics. Pt did not complete standing WB ex this date and chose to use CP at home.  Pt amb with SLC with flexed cervical and lumbar spine with slow bashir and some hesitation with initial stepping for amb noting L hip pain as limiting. Pt educated in role of walker use to assist with postural considerations, gait mechanics and potential role in sx reduction for L hip. Discussed tools of bed rail and long leg  to assist with post op.  Pt does have a shower chair.       Assessment: Tolerated treatment well. Patient would benefit from continued PT, skilled care to address impairments. Emphasis on HEP for pt to con to progress with hip strength.       Plan: Continue per plan of care.      Precautions: spine surgery, R THR hx, glut m  repair R, gait dysfunction, fall risk     Daily Treatment Diary:      Initial Evaluation Date: 24  Compliance 12/3  12/6  12/9  12/12  12/16  12/19           Visit Number 1 2  3  4  5  6           Re-Eval  IE                 MC   Foto Captured                    "         12/3  12/6  12/9  12/12  12/16  12/19           Manual                      L hip long axis distraction, lat distrxn, post glides   PC  PC  PC  MM  pc           PROM L hip   flex, abd, ER, ext  8min  flex, abd, ER, ext 8 m  flex, abd, ER, ext 8m   flex, abd, ER, ext 8m   flex, abd, ER, ext 8m                                 Ther-Ex                      QS, GS   10x10\" h/l  5x5\" st  10x10\" h/l   10x10\" h/l  10x10\" h/l   15x10\" h/l           BKFOs   10x10\"  10x10\"  10x10\"  10x10\"  10x10           IR/ER rolls   -                   Abd slides supine   10x AA  10x AA  10x AA  10x AA  15 x AA           SAQ/LAQ   3# s, 0# l 2 x 15, 3\"  3# s, 0# l  2 x 15, 3\"  3# s, 0# L 2 x 15, 3\"  3# s, 0# L 2 x 15, 3\"  4# saq and laq 2 x 10, 3\"           Hip 3 way standing   nc  15x  15x  15x  nv           TR/HR   nc  15x  15x  15x  nv           bridges   hip ext iso  hip ext iso 15x5\"  hip iso ext 15x5\", abd 15x5\"   hip iso ext 15x5\", abd 15x5\"   hip ext iso, abd iso, ad iso/AA flexion rom 15x3\"           LTR painfree     20x  20x  20x  20x           Nu step     10m L3  10m L3  10m L3  10m L3           Neuro Re-Ed                      Mini squats                      Weight shifting             SLB training             Sit to stands big reach                          Ther-Act              transfers/bed mobility Us eof AD, fall education, PC                                                Modalities                      HP/CP                                                                    "

## 2024-12-27 ENCOUNTER — OFFICE VISIT (OUTPATIENT)
Dept: PHYSICAL THERAPY | Facility: CLINIC | Age: 74
End: 2024-12-27
Payer: MEDICARE

## 2024-12-27 DIAGNOSIS — M16.12 ARTHRITIS OF LEFT HIP: Primary | ICD-10-CM

## 2024-12-27 DIAGNOSIS — R26.9 GAIT DIFFICULTY: ICD-10-CM

## 2024-12-27 PROCEDURE — 97110 THERAPEUTIC EXERCISES: CPT | Performed by: PHYSICAL THERAPIST

## 2024-12-27 PROCEDURE — 97140 MANUAL THERAPY 1/> REGIONS: CPT | Performed by: PHYSICAL THERAPIST

## 2024-12-27 NOTE — PROGRESS NOTES
Daily Note/DIscharge     Today's date: 2024  Patient name: RACHNA Pulido  : 1950  MRN: [id number]  Referring provider: Nilo Ramos MD  Dx:   Encounter Diagnosis     ICD-10-CM    1. Arthritis of left hip  M16.12       2. Gait difficulty  R26.9            Addend - resolve episode 2024 with d/c LUIS Schwarz          Subjective: Pt reports she is beginning aqua therapy for her lumbar spine in January and will discontinue with PT for L hip pre op strength work today. She notes daily limiting L hip pain impacting transfers, standing , walking, self care of garments, grooming LE, homekeeping, travel. She notes LBP also impacts these tasks. Pt notes difficulty with standing erectly and at times notes RLE is challenging to move ahead with all Weight on LLE.  She uses a cane and does use walker at time also.       Objective: See treatment diary below. Pt proceeded through her tx plan today with limited hip mobility secondary to pain and loss of mobility. In standing, LBP, R lateral hip pain and  dec strength in LLE reported as limiting requiring B UE A on parallel bars.  Hip ROM loss is moderate through out for  flexion, ER, IR, extension and abduction. Strength of L hip mm 3+/5 to 4-/5 for flexion, abd, ext, IR, ER. Pt L hip pain reported 2/10 at best to 8/10 at worst. Pt amb with SLC LUE with some hesitation with initial advancement of RLE , trunk maintained in flexion no trunk rotation or arm swing. Pt is unable with  SLC A to stand erectly for amb. Stride length B decreased.       Assessment: Tolerated treatment well. Patient  wishing to d/c care and cont with HEP      Plan: Continue per plan of care.      Precautions: spine surgery, R THR hx, glut m  repair R, gait dysfunction, fall risk     Daily Treatment Diary:      Initial Evaluation Date: 24  Compliance 12/3  12/6  12/9  12/12  12/16  12/19  12/27         Visit Number 1 2  3  4  5  6  7         Re-Eval  IE                   "  Foto Captured                            12/3  12/6  12/9  12/12  12/16  12/19  12/27         Manual                      L hip long axis distraction, lat distrxn, post glides   PC  PC  PC  MM  pc  pc         PROM L hip   flex, abd, ER, ext  8min  flex, abd, ER, ext 8 m  flex, abd, ER, ext 8m   flex, abd, ER, ext 8m   flex, abd, ER, ext 8m  flex, agd, ER, ext 8m                               Ther-Ex                      QS, GS   10x10\" h/l  5x5\" st  10x10\" h/l   10x10\" h/l  10x10\" h/l   15x10\" h/l  15x10\" h/l         BKFOs   10x10\"  10x10\"  10x10\"  10x10\"  10x10  10x10         IR/ER rolls   -                   Abd slides supine   10x AA  10x AA  10x AA  10x AA  15 x AA  15xaa         SAQ/LAQ   3# s, 0# l 2 x 15, 3\"  3# s, 0# l  2 x 15, 3\"  3# s, 0# L 2 x 15, 3\"  3# s, 0# L 2 x 15, 3\"  4# saq and laq 2 x 10, 3\"  4# saq//nc laq         Hip 3 way standing   nc  15x  15x  15x  nv  2x10         TR/HR   nc  15x  15x  15x  nv  -         bridges   hip ext iso  hip ext iso 15x5\"  hip iso ext 15x5\", abd 15x5\"   hip iso ext 15x5\", abd 15x5\"   hip ext iso, abd iso, ad iso/AA flexion rom 15x3\"  hip ext iso, abd iso, ad iso/AA flexion rom 15x3\"         LTR painfree     20x  20x  20x  20x  20x         Nu step     10m L3  10m L3  10m L3  10m L3  -         Neuro Re-Ed                      Mini squats                      Weight shifting             SLB training             Sit to stands big reach                          Ther-Act              transfers/bed mobility Us eof AD, fall education, PC                                                Modalities                      HP/CP                                                                      "

## 2025-01-08 ENCOUNTER — OFFICE VISIT (OUTPATIENT)
Dept: LAB | Facility: CLINIC | Age: 75
End: 2025-01-08
Payer: MEDICARE

## 2025-01-08 ENCOUNTER — APPOINTMENT (OUTPATIENT)
Dept: LAB | Facility: CLINIC | Age: 75
End: 2025-01-08
Payer: MEDICARE

## 2025-01-08 DIAGNOSIS — M89.9 DISORDER OF BONE: ICD-10-CM

## 2025-01-08 DIAGNOSIS — Z01.812 PRE-OPERATIVE LABORATORY EXAMINATION: ICD-10-CM

## 2025-01-08 DIAGNOSIS — M16.12 ARTHRITIS OF LEFT HIP: ICD-10-CM

## 2025-01-08 DIAGNOSIS — M25.552 LEFT HIP PAIN: ICD-10-CM

## 2025-01-08 DIAGNOSIS — M16.12 PRIMARY OSTEOARTHRITIS OF LEFT HIP: ICD-10-CM

## 2025-01-08 DIAGNOSIS — K21.9 GASTROESOPHAGEAL REFLUX DISEASE, UNSPECIFIED WHETHER ESOPHAGITIS PRESENT: ICD-10-CM

## 2025-01-08 DIAGNOSIS — E61.1 IRON DEFICIENCY: ICD-10-CM

## 2025-01-08 LAB
25(OH)D3 SERPL-MCNC: 75.6 NG/ML (ref 30–100)
ANION GAP SERPL CALCULATED.3IONS-SCNC: 9 MMOL/L (ref 4–13)
BASOPHILS # BLD AUTO: 0.07 THOUSANDS/ΜL (ref 0–0.1)
BASOPHILS NFR BLD AUTO: 1 % (ref 0–1)
BUN SERPL-MCNC: 20 MG/DL (ref 5–25)
CALCIUM SERPL-MCNC: 10.2 MG/DL (ref 8.4–10.2)
CHLORIDE SERPL-SCNC: 101 MMOL/L (ref 96–108)
CO2 SERPL-SCNC: 29 MMOL/L (ref 21–32)
CREAT SERPL-MCNC: 0.74 MG/DL (ref 0.6–1.3)
EOSINOPHIL # BLD AUTO: 0.22 THOUSAND/ΜL (ref 0–0.61)
EOSINOPHIL NFR BLD AUTO: 3 % (ref 0–6)
ERYTHROCYTE [DISTWIDTH] IN BLOOD BY AUTOMATED COUNT: 14 % (ref 11.6–15.1)
GFR SERPL CREATININE-BSD FRML MDRD: 80 ML/MIN/1.73SQ M
GLUCOSE P FAST SERPL-MCNC: 109 MG/DL (ref 65–99)
HCT VFR BLD AUTO: 46 % (ref 34.8–46.1)
HGB BLD-MCNC: 14.4 G/DL (ref 11.5–15.4)
IMM GRANULOCYTES # BLD AUTO: 0.03 THOUSAND/UL (ref 0–0.2)
IMM GRANULOCYTES NFR BLD AUTO: 0 % (ref 0–2)
IRON SATN MFR SERPL: 20 % (ref 15–50)
IRON SERPL-MCNC: 114 UG/DL (ref 50–212)
LYMPHOCYTES # BLD AUTO: 2.26 THOUSANDS/ΜL (ref 0.6–4.47)
LYMPHOCYTES NFR BLD AUTO: 30 % (ref 14–44)
MCH RBC QN AUTO: 28.8 PG (ref 26.8–34.3)
MCHC RBC AUTO-ENTMCNC: 31.3 G/DL (ref 31.4–37.4)
MCV RBC AUTO: 92 FL (ref 82–98)
MONOCYTES # BLD AUTO: 0.69 THOUSAND/ΜL (ref 0.17–1.22)
MONOCYTES NFR BLD AUTO: 9 % (ref 4–12)
NEUTROPHILS # BLD AUTO: 4.39 THOUSANDS/ΜL (ref 1.85–7.62)
NEUTS SEG NFR BLD AUTO: 57 % (ref 43–75)
NRBC BLD AUTO-RTO: 0 /100 WBCS
PLATELET # BLD AUTO: 312 THOUSANDS/UL (ref 149–390)
PMV BLD AUTO: 9.7 FL (ref 8.9–12.7)
POTASSIUM SERPL-SCNC: 5 MMOL/L (ref 3.5–5.3)
RBC # BLD AUTO: 5 MILLION/UL (ref 3.81–5.12)
SODIUM SERPL-SCNC: 139 MMOL/L (ref 135–147)
TIBC SERPL-MCNC: 560 UG/DL (ref 250–450)
TRANSFERRIN SERPL-MCNC: 400 MG/DL (ref 203–362)
UIBC SERPL-MCNC: 446 UG/DL (ref 155–355)
VIT B12 SERPL-MCNC: 201 PG/ML (ref 180–914)
WBC # BLD AUTO: 7.66 THOUSAND/UL (ref 4.31–10.16)

## 2025-01-08 PROCEDURE — 83540 ASSAY OF IRON: CPT

## 2025-01-08 PROCEDURE — 93005 ELECTROCARDIOGRAM TRACING: CPT

## 2025-01-08 PROCEDURE — 82607 VITAMIN B-12: CPT

## 2025-01-08 PROCEDURE — 36415 COLL VENOUS BLD VENIPUNCTURE: CPT

## 2025-01-08 PROCEDURE — 80048 BASIC METABOLIC PNL TOTAL CA: CPT

## 2025-01-08 PROCEDURE — 83550 IRON BINDING TEST: CPT

## 2025-01-08 PROCEDURE — 85025 COMPLETE CBC W/AUTO DIFF WBC: CPT

## 2025-01-08 PROCEDURE — 82306 VITAMIN D 25 HYDROXY: CPT

## 2025-01-09 LAB
ATRIAL RATE: 85 BPM
P AXIS: 47 DEGREES
PR INTERVAL: 146 MS
QRS AXIS: -43 DEGREES
QRSD INTERVAL: 84 MS
QT INTERVAL: 376 MS
QTC INTERVAL: 448 MS
T WAVE AXIS: 15 DEGREES
VENTRICULAR RATE: 85 BPM

## 2025-03-11 ENCOUNTER — EVALUATION (OUTPATIENT)
Dept: PHYSICAL THERAPY | Facility: CLINIC | Age: 75
End: 2025-03-11
Payer: MEDICARE

## 2025-03-11 VITALS — SYSTOLIC BLOOD PRESSURE: 130 MMHG | OXYGEN SATURATION: 97 % | HEART RATE: 94 BPM | DIASTOLIC BLOOD PRESSURE: 77 MMHG

## 2025-03-11 DIAGNOSIS — M25.552 ACUTE POSTOPERATIVE PAIN OF LEFT HIP: ICD-10-CM

## 2025-03-11 DIAGNOSIS — M16.12 OSTEOARTHRITIS OF LEFT HIP, UNSPECIFIED OSTEOARTHRITIS TYPE: Primary | ICD-10-CM

## 2025-03-11 DIAGNOSIS — G89.18 ACUTE POSTOPERATIVE PAIN OF LEFT HIP: ICD-10-CM

## 2025-03-11 DIAGNOSIS — Z96.642 HISTORY OF LEFT HIP REPLACEMENT: ICD-10-CM

## 2025-03-11 DIAGNOSIS — R26.9 GAIT DIFFICULTY: ICD-10-CM

## 2025-03-11 PROCEDURE — 97162 PT EVAL MOD COMPLEX 30 MIN: CPT | Performed by: PHYSICAL THERAPIST

## 2025-03-11 PROCEDURE — 97110 THERAPEUTIC EXERCISES: CPT | Performed by: PHYSICAL THERAPIST

## 2025-03-11 NOTE — LETTER
2025    Nilo Ramos MD  [address]  [facility]     Patient: RACHNA Pulido   YOB: 1950   Date of Visit: 3/11/2025     Encounter Diagnosis     ICD-10-CM    1. Osteoarthritis of left hip, unspecified osteoarthritis type  M16.12       2. History of left hip replacement  Z96.642       3. Acute postoperative pain of left hip  G89.18     M25.552       4. Gait difficulty  R26.9           Dear Dr. Ramos:    Thank you for your recent referral of RACHNA Pulido. Please review the attached evaluation summary from A Porsche's recent visit.     Please verify that you agree with the plan of care by signing the attached order.     If you have any questions or concerns, please do not hesitate to call.     I sincerely appreciate the opportunity to share in the care of one of your patients and hope to have another opportunity to work with you in the near future.       Sincerely,    Charito Nunn, PT      Referring Provider:      I certify that I have read the below Plan of Care and certify the need for these services furnished under this plan of treatment while under my care.                    Nilo Ramos MD  [address]  [facility]   Via Fax: [phone]          PT Evaluation     Today's date: 2024  Patient name: RACHNA Pulido  : 1950  MRN: [id number]  Referring provider: Nilo Ramos MD  Dx:   Encounter Diagnosis     ICD-10-CM    1. Osteoarthritis of left hip, unspecified osteoarthritis type  M16.12       2. History of left hip replacement  Z96.642       3. Acute postoperative pain of left hip  G89.18     M25.552       4. Gait difficulty  R26.9                              Assessment  Impairments: abnormal gait, abnormal or restricted ROM, impaired physical strength, poor posture  and endurance  Functional limitations: 5 x sit to stand  18:39 with use of R UE on chair seat, 16.82 seconds TUG with SLC in L UE,  hesitancy with  initation of amb following sit to stand  Symptom irritability: low    Assessment details: The patient is a 75 y/o female who presents to PT s/p L THR  2/20/25 after discharge from home PT 3/7/2025. Pt presents with gait dysfunction involving RLE Greater than L,  healing incision, decrease in L hip ROM and strength, balance of single limb, decrease in amb endurance, gait dysfunction on levels and stairs, decrease in standing time and ability to complete ADLs.  Pt has hx of chronic back pain, lumbar surgery including fusion and current thoracolumbar scoliosis, R hip surgery (R hip trochanteric bursectomy with gluteus medius repair and IT band transfer) on 2/15/24.  She has complaints of intermittent pulling/pain in her  L hip.  She demonstrates deficits with decreased ROM and strength, decreased flexibility, decreased balance and proprioception and edema.  These deficits lead to difficulty with stair negotiation, gait dysfunction and pain with completing her ADLs and tasks at home.  Pt amb   with SLC  60ft distance with dec NIMESH, dec stride length, dec RLE ankle df/pf, decrease trunk rotation and RUE arm swing.  Pt will benefit from  skilled care to achieve PT goals of care.       Understanding of Dx/Px/POC: excellent     Prognosis: excellent    Goals  STGs:  1.  Initiate and complete HEP with verbal cues  2.  Improve L hip ROM by 5-10 degrees flexion, abduction, R ankle pf, df  3.  Improve L and R  LE strength by 1/2 grade  for hip flexion, extension, abduction, R and L knee flex, ext, B ankle pf,df  4.  Decrease L hip pain by 25%   5. SLB either LE = 10-15 secondsL   LTGs: progressing  1.  Patient to be I with HEP in 12 weeks.  2.  Improve L  Hip ROM to WFLs  t/o in 12 weeks to improve function.  3.  Improve L  and R  LE strength 4 to 4+/5 t/o in 12 weeks to improve function.  4.  Decrease L  hip pain to < or = to 1/10 with activity in 12 weeks to improve function.   5.  Patient to ambulate with normalized gait  "pattern without AD in 12 weeks.  6.  Stair negotiation is improved to reciprocal  with U HR A in 12 weeks.              Plan  Patient would benefit from: skilled physical therapy  Planned modality interventions: cryotherapy    Planned therapy interventions: abdominal trunk stabilization, gait training, functional ROM exercises, stretching, strengthening, neuromuscular re-education, manual therapy, home exercise program, therapeutic exercise, postural training, patient education, self care and balance/weight bearing training    Frequency: 3x week (2-3 x per week)  Duration in weeks: 12  Plan of Care beginning date: 3/11/2025  Plan of Care expiration date: 4/15/2025  Treatment plan discussed with: patient  Plan details:          Subjective Evaluation    History of Present Illness  Mechanism of injury:  Pt underwent L THR 2/20/25 and discharged to home 2/21/25. She is using shower chair , toilet frame, walker to amb long distances.  She is using medications of  tramadol and tylenol x strength to manage her pain. Pt notes her legs \"finally feel equal length\" for first time since undergoing R THR years ago. Pt did receive home care PT and was discharged Friday, March 7. Pt notes she ihas resolve of pre operative  L hip pain sx.  She notes some chronic LBP and prior hx of spinal surgery. Pt notes she does feel less endurance for standing and walking since surgery but is slowly improving.  She notes sleeping well.  She follows with surgeon tomorrow. She is using cane for all amb in L UE, stating  tried to use in RUE but gait is not good with use in RUE.  Pt states she is amb on stairs nonreciprocal gait. She notes  assists with  all home keeping tasks.   Quality of life: excellent    Patient Goals  Patient goals for therapy: decreased pain, increased strength, increased motion and independence with ADLs/IADLs  Patient goal: to amb witho ut A device, improve on conditioning and fitness level to walk and standing  " longer,  to stand to complete cooking, cleaning, ambulation longer and on stairs improved  Pain  Current pain ratin  At best pain ratin  At worst pain ratin  Location: pulling pain in incision L  without radiation  Quality: pressure and pulling  Relieving factors: ice, rest, change in position and medications  Aggravating factors: standing and walking  Progression: no change    Social Support  Steps to enter house: yes  1  Stairs in house: yes   Lives in: multiple-level home  Lives with: spouse    Employment status: not working (retired)  Hand dominance: right    Treatments  Previous treatment: physical therapy, medication and injection treatment  Discharged from (in last 30 days) comments: discahrged post op day 2 from hospital.         Objective     Observations     Right Hip  Positive for edema and incision.     Additional Observation Details  8 cm incision closed without drainage or any signs of infection, mild edema about L anterolat hip    Palpation   Left   Tenderness of the iliopsoas and lumbar paraspinals.     Right   Tenderness of the lumbar paraspinals.     Neurological Testing     Sensation     Hip   Left Hip   Intact: light touch    Right Hip   Intact: light touch    Additional Neurological Details  Intermittent tremor RLE noted, pt reports she has spoken with her pcp regarding this     Active Range of Motion   Left Hip   Flexion: 100 degrees   Extension: 0 degrees   Abduction: 22 degrees     Right Hip   Flexion: 100 degrees   Extension: 5 degrees   Abduction: 30 degrees     Additional Active Range of Motion Details  Standing aslr 30 deg either LE   Bankle df 5-7 deg.     Passive Range of Motion   Left Hip   Flexion: 102 degrees   Abduction: 25 degrees     Strength/Myotome Testing     Left Hip   Planes of Motion   Flexion: 4-  Extension: 4-  Abduction: 4-  Adduction: 4    Right Hip   Planes of Motion   Flexion: 4  Extension: 4-  Abduction: 4-  Adduction: 4    Left Knee   Flexion:  4+  Extension: 4+    Right Knee   Flexion: 3-  Extension: 3-  Quadriceps contraction: fair    Left Ankle/Foot   Dorsiflexion: 4+  Plantar flexion: 4+  Inversion: 4+  Eversion: 4+  Great toe flexion: 4+  Great toe extension: 4+    Right Ankle/Foot   Dorsiflexion: 4+  Plantar flexion: 4+  Eversion: 4+  Great toe flexion: 4+  Great toe extension: 4+    Additional Strength Details  R knee ext 4-/5, flex 4-/5  ankle df 3+/5 to 4-/5  L knee ext 4/5, flex 4/5, ankle df 4-/5    Ambulation   Weight-Bearing Status   Assistive device used: single point cane and two-wheeled walker    Additional Weight-Bearing Status Details  Using SLC for household and short community distances, in LUE.  NBOS demonstrated with dec in R ankle df, pf through gait cycle.  Pt demonstrates safety with amb in tx  60 ft x 2 but does note some fatigue of Les at that distance with pre 3-4/10.   Pt reports uses walker at home for longer distances.             Ambulation: Stairs   Ascend stairs: independent  Pattern: non-reciprocal  Railings: one rail  Descend stairs: independent  Pattern: non-reciprocal  Railings: one rail    Observational Gait   Decreased walking speed, stride length, left step length and right step length.   Left foot contact pattern: heel to toe  Right foot contact pattern: foot flat  Left arm swing: decreased  Right arm swing: decreased  Base of support: decreased    Additional Observational Gait Details  Pt has slight trunk flexed posture with amb. Scoliosis through thoracolumbar spine observed.             Precautions: DOS:  L THR ANT approach 2/20/25,  DOS 2/15/24 (R hip trochanteric bursectomy with gluteus medius repair and IT band transfer), Lumbar surgery, R THR, HTN      Daily Treatment Diary:      Initial Evaluation Date: 03/11/25  Compliance 3/11            Visit Number 1            Re-Eval  Y IE                 Foto Captured Y                       3/11            Manual             L Hip PROM- ant hip prec nv                "          STM L  scar, hip / lumbar soft tissue --            Ther-Ex                    nustep 6m L 2            T abd, flex, leg curl 5x            BLE to U LE bridge with TA set -            LAQ/SAQ 5x            QSets 5x            ASLRS  -            Heel Slides -            Clam shell             Hip abd R S/L              Reviewed and issued HEP POC , tx plan, goals and aerobic ex, use of AD all times                   Neuro Re-Ed                    Mini squats   -    --     -       SLB floor  SLB Air Ex -            Step ups -            Step taps 4\" -            Weight shifting  F/B, S to S //bars             Ther-Act              STS 5x sts, tug slc pc    --           Amb/gait tr                           Modalities                    CP L hip                                                                        "

## 2025-03-11 NOTE — PROGRESS NOTES
PT Evaluation     Today's date: 2024  Patient name: RACHNA Pulido  : 1950  MRN: [id number]  Referring provider: Nilo Ramos MD  Dx:   Encounter Diagnosis     ICD-10-CM    1. Osteoarthritis of left hip, unspecified osteoarthritis type  M16.12       2. History of left hip replacement  Z96.642       3. Acute postoperative pain of left hip  G89.18     M25.552       4. Gait difficulty  R26.9                              Assessment  Impairments: abnormal gait, abnormal or restricted ROM, impaired physical strength, poor posture  and endurance  Functional limitations: 5 x sit to stand  18:39 with use of R UE on chair seat, 16.82 seconds TUG with SLC in L UE,  hesitancy with initation of amb following sit to stand  Symptom irritability: low    Assessment details: The patient is a 73 y/o female who presents to PT s/p L THR  25 after discharge from home PT 3/7/2025. Pt presents with gait dysfunction involving RLE Greater than L,  healing incision, decrease in L hip ROM and strength, balance of single limb, decrease in amb endurance, gait dysfunction on levels and stairs, decrease in standing time and ability to complete ADLs.  Pt has hx of chronic back pain, lumbar surgery including fusion and current thoracolumbar scoliosis, R hip surgery (R hip trochanteric bursectomy with gluteus medius repair and IT band transfer) on 2/15/24.  She has complaints of intermittent pulling/pain in her  L hip.  She demonstrates deficits with decreased ROM and strength, decreased flexibility, decreased balance and proprioception and edema.  These deficits lead to difficulty with stair negotiation, gait dysfunction and pain with completing her ADLs and tasks at home.  Pt amb   with SLC  60ft distance with dec NIMESH, dec stride length, dec RLE ankle df/pf, decrease trunk rotation and RUE arm swing.  Pt will benefit from  skilled care to achieve PT goals of care.       Understanding of Dx/Px/POC: excellent     Prognosis:  "excellent    Goals  STGs:  1.  Initiate and complete HEP with verbal cues  2.  Improve L hip ROM by 5-10 degrees flexion, abduction, R ankle pf, df  3.  Improve L and R  LE strength by 1/2 grade  for hip flexion, extension, abduction, R and L knee flex, ext, B ankle pf,df  4.  Decrease L hip pain by 25%   5. SLB either LE = 10-15 secondsL   LTGs: progressing  1.  Patient to be I with HEP in 12 weeks.  2.  Improve L  Hip ROM to WFLs  t/o in 12 weeks to improve function.  3.  Improve L  and R  LE strength 4 to 4+/5 t/o in 12 weeks to improve function.  4.  Decrease L  hip pain to < or = to 1/10 with activity in 12 weeks to improve function.   5.  Patient to ambulate with normalized gait pattern without AD in 12 weeks.  6.  Stair negotiation is improved to reciprocal  with U HR A in 12 weeks.              Plan  Patient would benefit from: skilled physical therapy  Planned modality interventions: cryotherapy    Planned therapy interventions: abdominal trunk stabilization, gait training, functional ROM exercises, stretching, strengthening, neuromuscular re-education, manual therapy, home exercise program, therapeutic exercise, postural training, patient education, self care and balance/weight bearing training    Frequency: 3x week (2-3 x per week)  Duration in weeks: 12  Plan of Care beginning date: 3/11/2025  Plan of Care expiration date: 4/15/2025  Treatment plan discussed with: patient  Plan details:          Subjective Evaluation    History of Present Illness  Mechanism of injury:  Pt underwent L THR 2/20/25 and discharged to home 2/21/25. She is using shower chair , toilet frame, walker to amb long distances.  She is using medications of  tramadol and tylenol x strength to manage her pain. Pt notes her legs \"finally feel equal length\" for first time since undergoing R THR years ago. Pt did receive home care PT and was discharged Friday, March 7. Pt notes she ihas resolve of pre operative  L hip pain sx.  She notes " some chronic LBP and prior hx of spinal surgery. Pt notes she does feel less endurance for standing and walking since surgery but is slowly improving.  She notes sleeping well.  She follows with surgeon tomorrow. She is using cane for all amb in L UE, stating  tried to use in RUE but gait is not good with use in RUE.  Pt states she is amb on stairs nonreciprocal gait. She notes  assists with  all home keeping tasks.   Quality of life: excellent    Patient Goals  Patient goals for therapy: decreased pain, increased strength, increased motion and independence with ADLs/IADLs  Patient goal: to amb witho ut A device, improve on conditioning and fitness level to walk and standing  longer,  to stand to complete cooking, cleaning, ambulation longer and on stairs improved  Pain  Current pain ratin  At best pain ratin  At worst pain ratin  Location: pulling pain in incision L  without radiation  Quality: pressure and pulling  Relieving factors: ice, rest, change in position and medications  Aggravating factors: standing and walking  Progression: no change    Social Support  Steps to enter house: yes  1  Stairs in house: yes   Lives in: multiple-level home  Lives with: spouse    Employment status: not working (retired)  Hand dominance: right    Treatments  Previous treatment: physical therapy, medication and injection treatment  Discharged from (in last 30 days) comments: discahrged post op day 2 from hospital.         Objective     Observations     Right Hip  Positive for edema and incision.     Additional Observation Details  8 cm incision closed without drainage or any signs of infection, mild edema about L anterolat hip    Palpation   Left   Tenderness of the iliopsoas and lumbar paraspinals.     Right   Tenderness of the lumbar paraspinals.     Neurological Testing     Sensation     Hip   Left Hip   Intact: light touch    Right Hip   Intact: light touch    Additional Neurological Details  Intermittent  tremor RLE noted, pt reports she has spoken with her pcp regarding this     Active Range of Motion   Left Hip   Flexion: 100 degrees   Extension: 0 degrees   Abduction: 22 degrees     Right Hip   Flexion: 100 degrees   Extension: 5 degrees   Abduction: 30 degrees     Additional Active Range of Motion Details  Standing aslr 30 deg either LE   Bankle df 5-7 deg.     Passive Range of Motion   Left Hip   Flexion: 102 degrees   Abduction: 25 degrees     Strength/Myotome Testing     Left Hip   Planes of Motion   Flexion: 4-  Extension: 4-  Abduction: 4-  Adduction: 4    Right Hip   Planes of Motion   Flexion: 4  Extension: 4-  Abduction: 4-  Adduction: 4    Left Knee   Flexion: 4+  Extension: 4+    Right Knee   Flexion: 3-  Extension: 3-  Quadriceps contraction: fair    Left Ankle/Foot   Dorsiflexion: 4+  Plantar flexion: 4+  Inversion: 4+  Eversion: 4+  Great toe flexion: 4+  Great toe extension: 4+    Right Ankle/Foot   Dorsiflexion: 4+  Plantar flexion: 4+  Eversion: 4+  Great toe flexion: 4+  Great toe extension: 4+    Additional Strength Details  R knee ext 4-/5, flex 4-/5  ankle df 3+/5 to 4-/5  L knee ext 4/5, flex 4/5, ankle df 4-/5    Ambulation   Weight-Bearing Status   Assistive device used: single point cane and two-wheeled walker    Additional Weight-Bearing Status Details  Using SLC for household and short community distances, in LUE.  NBOS demonstrated with dec in R ankle df, pf through gait cycle.  Pt demonstrates safety with amb in tx  60 ft x 2 but does note some fatigue of Les at that distance with pre 3-4/10.   Pt reports uses walker at home for longer distances.             Ambulation: Stairs   Ascend stairs: independent  Pattern: non-reciprocal  Railings: one rail  Descend stairs: independent  Pattern: non-reciprocal  Railings: one rail    Observational Gait   Decreased walking speed, stride length, left step length and right step length.   Left foot contact pattern: heel to toe  Right foot contact  "pattern: foot flat  Left arm swing: decreased  Right arm swing: decreased  Base of support: decreased    Additional Observational Gait Details  Pt has slight trunk flexed posture with amb. Scoliosis through thoracolumbar spine observed.             Precautions: DOS:  L THR ANT approach 2/20/25,  DOS 2/15/24 (R hip trochanteric bursectomy with gluteus medius repair and IT band transfer), Lumbar surgery, R THR, HTN      Daily Treatment Diary:      Initial Evaluation Date: 03/11/25  Compliance 3/11            Visit Number 1            Re-Eval  Y IE                 Foto Captured Y                       3/11            Manual             L Hip PROM- ant hip prec nv                         STM L  scar, hip / lumbar soft tissue --            Ther-Ex                    nustep 6m L 2            T abd, flex, leg curl 5x            BLE to U LE bridge with TA set -            LAQ/SAQ 5x            QSets 5x            ASLRS  -            Heel Slides -            Clam shell             Hip abd R S/L              Reviewed and issued HEP POC , tx plan, goals and aerobic ex, use of AD all times                   Neuro Re-Ed                    Mini squats   -    --     -       SLB floor  SLB Air Ex -            Step ups -            Step taps 4\" -            Weight shifting  F/B, S to S //bars             Ther-Act              STS 5x sts, tug slc pc    --           Amb/gait tr                           Modalities                    CP L hip                                                        "

## 2025-03-13 ENCOUNTER — OFFICE VISIT (OUTPATIENT)
Dept: PHYSICAL THERAPY | Facility: CLINIC | Age: 75
End: 2025-03-13
Payer: MEDICARE

## 2025-03-13 DIAGNOSIS — G89.18 ACUTE POSTOPERATIVE PAIN OF LEFT HIP: ICD-10-CM

## 2025-03-13 DIAGNOSIS — Z96.642 HISTORY OF LEFT HIP REPLACEMENT: ICD-10-CM

## 2025-03-13 DIAGNOSIS — M25.552 ACUTE POSTOPERATIVE PAIN OF LEFT HIP: ICD-10-CM

## 2025-03-13 DIAGNOSIS — M16.12 OSTEOARTHRITIS OF LEFT HIP, UNSPECIFIED OSTEOARTHRITIS TYPE: Primary | ICD-10-CM

## 2025-03-13 PROCEDURE — 97110 THERAPEUTIC EXERCISES: CPT | Performed by: PHYSICAL THERAPIST

## 2025-03-13 NOTE — PROGRESS NOTES
"Daily Note     Today's date: 3/13/2025  Patient name: RACHNA Pulido  : 1950  MRN: [id number]  Referring provider: Nilo Ramos MD  Dx:   Encounter Diagnosis     ICD-10-CM    1. Osteoarthritis of left hip, unspecified osteoarthritis type  M16.12       2. History of left hip replacement  Z96.642       3. Acute postoperative pain of left hip  G89.18     M25.552                      Subjective: Pt notes she was seen by her surgeon  and will return for follow up in 6 months. Xrays completed she reports.       Objective: See treatment diary below. Pt continued with care plan today. She progressed wtith reps and added 2# for LAQ seated today. Pt required mod cueing for march, hip flexion in standing on LLE.  Pt noted no pain sx but did have some muscle fatigue.       Assessment: Tolerated treatment well. Patient would benefit from continued PT, skilled care to achieve goals outlined.       Plan: Continue per plan of care.      Precautions: DOS:  L THR ANT approach 25,  DOS 2/15/24 (R hip trochanteric bursectomy with gluteus medius repair and IT band transfer), Lumbar surgery, R THR, HTN      Daily Treatment Diary:      Initial Evaluation Date: 25  Compliance 3/11 3/13           Visit Number 1 2           Re-Eval  Y IE                 Foto Captured Y                       3/11 2           Manual             L Hip PROM- ant hip prec nv                         STM L  scar, hip / lumbar soft tissue -- nv           Ther-Ex                    nustep 6m L 2 L4 8m           T abd, flex, leg curl 5x 15 ea           BLE to U LE bridge with TA set -            LAQ/SAQ 5x Laq2# 2 x 10, 2\"           QSets 5x            ASLRS  - St  15 x ea           Heel Slides -            Clam shell             Hip abd R S/L              Reviewed and issued HEP POC , tx plan, goals and aerobic ex, use of AD all times                   Neuro Re-Ed                    Mini squats   - 15x HR A   --     -       SLB floor  SLB Air " "Ex -            Step ups -            Step taps 4\" -            Weight shifting  F/B, S to S //bars             Ther-Act              STS 5x sts, tug slc pc 10x B UR A f! After nu step   --           Amb/gait tr                           Modalities                    CP L hip                                                             "

## 2025-03-18 ENCOUNTER — APPOINTMENT (OUTPATIENT)
Dept: PHYSICAL THERAPY | Facility: CLINIC | Age: 75
End: 2025-03-18
Payer: MEDICARE

## 2025-03-19 ENCOUNTER — OFFICE VISIT (OUTPATIENT)
Dept: PHYSICAL THERAPY | Facility: CLINIC | Age: 75
End: 2025-03-19
Payer: MEDICARE

## 2025-03-19 DIAGNOSIS — M16.12 OSTEOARTHRITIS OF LEFT HIP, UNSPECIFIED OSTEOARTHRITIS TYPE: Primary | ICD-10-CM

## 2025-03-19 DIAGNOSIS — G89.18 ACUTE POSTOPERATIVE PAIN OF LEFT HIP: ICD-10-CM

## 2025-03-19 DIAGNOSIS — M25.552 ACUTE POSTOPERATIVE PAIN OF LEFT HIP: ICD-10-CM

## 2025-03-19 DIAGNOSIS — R26.9 GAIT DIFFICULTY: ICD-10-CM

## 2025-03-19 DIAGNOSIS — Z96.642 HISTORY OF LEFT HIP REPLACEMENT: ICD-10-CM

## 2025-03-19 PROCEDURE — 97530 THERAPEUTIC ACTIVITIES: CPT | Performed by: PHYSICAL THERAPIST

## 2025-03-19 PROCEDURE — 97110 THERAPEUTIC EXERCISES: CPT | Performed by: PHYSICAL THERAPIST

## 2025-03-19 NOTE — PROGRESS NOTES
"Daily Note     Today's date: 3/19/2025  Patient name: RACHNA Pulido  : 1950  MRN: [id number]  Referring provider: Nilo Ramos MD  Dx:   Encounter Diagnosis     ICD-10-CM    1. Osteoarthritis of left hip, unspecified osteoarthritis type  M16.12       2. History of left hip replacement  Z96.642       3. Acute postoperative pain of left hip  G89.18     M25.552       4. Gait difficulty  R26.9                      Subjective: Pt reports some lower back pain and muscle soreness B buttocks this am.  Notes some challenges with stair amb at top and bottom of stairs where rails end.       Objective: See treatment diary below. Reviewed instruction and completed stair amb with U HR and SLC  with step to gait. Pt able to complete with improved  technique at end of session.  Progressed with S/L clam shells and  BKFOs , B LE bridging mod rom this date. Progressed to LBE x 5 min L 1 also with good tolerance.       Assessment: Tolerated treatment well. Patient would benefit from continued PT      Plan: Continue per plan of care.      Precautions: DOS:  L THR ANT approach 25,  DOS 2/15/24 (R hip trochanteric bursectomy with gluteus medius repair and IT band transfer), Lumbar surgery, R THR, HTN      Daily Treatment Diary:      Initial Evaluation Date: 25  Compliance 3/11 3/13 3/19          Visit Number 1 2 3          Re-Eval  Y IE                 Foto Captured Y                       3/11 2 3/19          Manual             L Hip PROM- ant hip prec nv                         STM L  scar, hip / lumbar soft tissue -- nv nv          Ther-Ex                    nustep 6m L 2 L4 8m L3 5m, LBE 5 m L1          T abd, flex, leg curl 5x 15 ea 10x ea          BLE to U LE bridge with TA set -  2 x 5 2\" hand A          LAQ/SAQ 5x Laq2# 2 x 10, 2\" Clam shell 3 x 10  p BKFOs 10x10          QSets 5x            ASLRS  - St  15 x ea St 10 x ea, march 15x          Heel Slides -            Clam shell             Hip abd R S/L   " "           Reviewed and issued HEP POC , tx plan, goals and aerobic ex, use of AD all times                   Neuro Re-Ed                    Mini squats   - 15x HR A   --     -       SLB floor  SLB Air Ex -            Step ups -  10x , 4\"          Step taps 4\" -            Weight shifting  F/B, S to S //bars             Ther-Act              STS 5x sts, tug slc pc 10x B UR A f! After nu step   --           Amb/gait tr   Stair amb U HR + SLC 5 x  non recip                        Modalities                    CP L hip                                                               "

## 2025-03-21 ENCOUNTER — OFFICE VISIT (OUTPATIENT)
Dept: PHYSICAL THERAPY | Facility: CLINIC | Age: 75
End: 2025-03-21
Payer: MEDICARE

## 2025-03-21 DIAGNOSIS — Z96.642 HISTORY OF LEFT HIP REPLACEMENT: ICD-10-CM

## 2025-03-21 DIAGNOSIS — G89.18 ACUTE POSTOPERATIVE PAIN OF LEFT HIP: ICD-10-CM

## 2025-03-21 DIAGNOSIS — R26.9 GAIT DIFFICULTY: ICD-10-CM

## 2025-03-21 DIAGNOSIS — M25.552 ACUTE POSTOPERATIVE PAIN OF LEFT HIP: ICD-10-CM

## 2025-03-21 DIAGNOSIS — M16.12 OSTEOARTHRITIS OF LEFT HIP, UNSPECIFIED OSTEOARTHRITIS TYPE: Primary | ICD-10-CM

## 2025-03-21 PROCEDURE — 97110 THERAPEUTIC EXERCISES: CPT | Performed by: PHYSICAL THERAPIST

## 2025-03-21 PROCEDURE — 97140 MANUAL THERAPY 1/> REGIONS: CPT | Performed by: PHYSICAL THERAPIST

## 2025-03-21 NOTE — PROGRESS NOTES
"Daily Note     Today's date: 3/21/2025  Patient name: RACHNA Pulido  : 1950  MRN: [id number]  Referring provider: Nilo Ramos MD  Dx:   Encounter Diagnosis     ICD-10-CM    1. Osteoarthritis of left hip, unspecified osteoarthritis type  M16.12       2. History of left hip replacement  Z96.642       3. Acute postoperative pain of left hip  G89.18     M25.552       4. Gait difficulty  R26.9                      Subjective: Pt notes she is doing well today, good tolerance to last tx.       Objective: See treatment diary below. Educated pt in and completed scar massage this date. Soft tissue mobility restriction present ant hip.       Assessment: Tolerated treatment well. Including scar massage. She was able to demonstrate return verbalization. Patient would benefit from continued PT, skilled care.       Plan: Continue per plan of care.      Precautions: DOS:  L THR ANT approach 25,  DOS 2/15/24 (R hip trochanteric bursectomy with gluteus medius repair and IT band transfer), Lumbar surgery, R THR, HTN      Daily Treatment Diary:      Initial Evaluation Date: 25  Compliance 3/11 3/13 3/19 3/21         Visit Number 1 2 3 4         Re-Eval  Y IE                 Foto Captured Y                       3/11 2 3/19 3/21         Manual             L Hip PROM- ant hip prec nv                         STM L  scar, hip / lumbar soft tissue -- nv nv Completed pc with educ for HEP also         Ther-Ex                    nustep 6m L 2 L4 8m L3 5m, LBE 5 m L1 L3 5m, /LBE 5m L1 CD PT         T abd, flex, leg curl 5x 15 ea 10x ea -         BLE to U LE bridge with TA set -  2 x 5 2\" hand A 2 x 10, 3\" hand A         LAQ/SAQ 5x Laq2# 2 x 10, 2\" Clam shell 3 x 10  p BKFOs 10x10 Clam shell 3 x 10//bkfos with TA 2 x 10, 5\"         QSets 5x   Supine ASLR 3x5 L, 20x R         ASLRS  - St  15 x ea St 10 x , march 15x          Heel Slides -   SAQ 3\" L 2 x 10, 5\"         Clam shell             Hip abd R S/L     AA  mod A " "of PT for task 2 x 10         Reviewed and issued HEP POC , tx plan, goals and aerobic ex, use of AD all times                   Neuro Re-Ed                    Mini squats   - 15x HR A   --     -       SLB floor  SLB Air Ex -            Step ups -  10x , 4\"          Step taps 4\" -            Weight shifting  F/B, S to S //bars             Ther-Act              STS 5x sts, tug slc pc 10x B UR A f! After nu step   --           Amb/gait tr   Stair amb U HR + SLC 5 x  non recip Stair amb U HR + SLC 1 lap, non recip                       Modalities                    CP L hip                                                                 "

## 2025-03-24 ENCOUNTER — OFFICE VISIT (OUTPATIENT)
Dept: PHYSICAL THERAPY | Facility: CLINIC | Age: 75
End: 2025-03-24
Payer: MEDICARE

## 2025-03-24 DIAGNOSIS — Z96.642 HISTORY OF LEFT HIP REPLACEMENT: ICD-10-CM

## 2025-03-24 DIAGNOSIS — R26.9 GAIT DIFFICULTY: ICD-10-CM

## 2025-03-24 DIAGNOSIS — M16.12 OSTEOARTHRITIS OF LEFT HIP, UNSPECIFIED OSTEOARTHRITIS TYPE: Primary | ICD-10-CM

## 2025-03-24 PROCEDURE — 97110 THERAPEUTIC EXERCISES: CPT | Performed by: PHYSICAL THERAPIST

## 2025-03-24 PROCEDURE — 97140 MANUAL THERAPY 1/> REGIONS: CPT | Performed by: PHYSICAL THERAPIST

## 2025-03-24 PROCEDURE — 97530 THERAPEUTIC ACTIVITIES: CPT | Performed by: PHYSICAL THERAPIST

## 2025-03-24 NOTE — PROGRESS NOTES
"Daily Note     Today's date: 3/24/2025  Patient name: RACHNA Pulido  : 1950  MRN: [id number]  Referring provider: Nilo Ramos MD  Dx:   Encounter Diagnosis     ICD-10-CM    1. Osteoarthritis of left hip, unspecified osteoarthritis type  M16.12       2. History of left hip replacement  Z96.642       3. Gait difficulty  R26.9                      Subjective: Pt notes she is doing well and increased her activity over the weekend including dancing a bit  with hand held A.       Objective: See treatment diary below      Assessment: Tolerated treatment well. Patient demonstrated fatigue post treatment and would benefit from continued PT, skilled care to achieve goals outlined. Pt did require education to avoid pivot on planted foot x 3.       Plan: Continue per plan of care.      Precautions: DOS:  L THR ANT approach 25,  DOS 2/15/24 (R hip trochanteric bursectomy with gluteus medius repair and IT band transfer), Lumbar surgery, R THR, HTN      Daily Treatment Diary:      Initial Evaluation Date: 25  Compliance 3/11 3/13 3/19 3/21 3/24        Visit Number 1 2 3 4 5        Re-Eval  Y IE                 Foto Captured Y        foto               3/11 2 3/19 3/21 3/24        Manual             L Hip PROM- ant hip prec nv                         STM L  scar, hip / lumbar soft tissue -- nv nv Completed pc with educ for HEP also pc        Ther-Ex                    nustep 6m L 2 L4 8m L3 5m, LBE 5 m L1 L3 5m, /LBE 5m L1 CD PT L3 5m/UBE 7m L1        T abd, flex, leg curl 5x 15 ea 10x ea -         BLE to U LE bridge with TA set -  2 x 5 2\" hand A 2 x 10, 3\" hand A 2 x 10, no hand A        LAQ/SAQ 5x Laq2# 2 x 10, 2\" Clam shell 3 x 10  p BKFOs 10x10 Clam shell 3 x 10//bkfos with TA 2 x 10, 5\" Clam shell 2# 3 x 10, bkfos with TA 10x10\"        QSets 5x   Supine ASLR 3x5 L, 20x R Supine aslr 4 x 5L, 20x R        ASLRS  - St  15 x ea St 10 x  15x  H/l march 2 x 10, 2\"        Heel Slides -   SAQ 3\" L " "2 x 10, 5\" Saq 3# 3 x 10, 3\" ea        Clam shell             Hip abd R S/L     AA  mod A of PT for task 2 x 10 Aa min to mod A of pt 2 x 15        Reviewed and issued HEP POC , tx plan, goals and aerobic ex, use of AD all times                   Neuro Re-Ed                    Mini squats   - 15x HR A   --     -       SLB floor  SLB Air Ex -            Step ups -  10x , 4\"          Step taps 4\" -            Weight shifting  F/B, S to S //bars             Ther-Act              STS 5x sts, tug slc pc 10x B UR A f! After nu step   --           Amb/gait tr   Stair amb U HR + SLC 5 x  non recip Stair amb U HR + SLC 1 lap, non recip Stair amb  B HR recip 6 laps 4-6\"                      Modalities                    CP L hip                                                                   "

## 2025-03-28 ENCOUNTER — OFFICE VISIT (OUTPATIENT)
Dept: PHYSICAL THERAPY | Facility: CLINIC | Age: 75
End: 2025-03-28
Payer: MEDICARE

## 2025-03-28 DIAGNOSIS — M16.12 OSTEOARTHRITIS OF LEFT HIP, UNSPECIFIED OSTEOARTHRITIS TYPE: Primary | ICD-10-CM

## 2025-03-28 DIAGNOSIS — Z96.642 HISTORY OF LEFT HIP REPLACEMENT: ICD-10-CM

## 2025-03-28 DIAGNOSIS — M25.552 ACUTE POSTOPERATIVE PAIN OF LEFT HIP: ICD-10-CM

## 2025-03-28 DIAGNOSIS — R26.9 GAIT DIFFICULTY: ICD-10-CM

## 2025-03-28 DIAGNOSIS — G89.18 ACUTE POSTOPERATIVE PAIN OF LEFT HIP: ICD-10-CM

## 2025-03-28 PROCEDURE — 97140 MANUAL THERAPY 1/> REGIONS: CPT | Performed by: PHYSICAL THERAPIST

## 2025-03-28 PROCEDURE — 97110 THERAPEUTIC EXERCISES: CPT | Performed by: PHYSICAL THERAPIST

## 2025-03-28 NOTE — PROGRESS NOTES
"Daily Note     Today's date: 3/28/2025  Patient name: RACHNA Pulido  : 1950  MRN: [id number]  Referring provider: Nilo Ramos MD  Dx:   Encounter Diagnosis     ICD-10-CM    1. Osteoarthritis of left hip, unspecified osteoarthritis type  M16.12       2. History of left hip replacement  Z96.642       3. Gait difficulty  R26.9       4. Acute postoperative pain of left hip  G89.18     M25.552                      Subjective: Pt notes having back pain as limiting with WB function located in lower central back and mid back. She notes amb for exercise and decreasing use of cymbalta.       Objective: See treatment diary below. Pt completed standing ex but declined h/l secondary to back pain aggravation sx this date.       Assessment: Tolerated treatment well. Patient       Plan: Continue per plan of care.      Precautions: DOS:  L THR ANT approach 25,  DOS 2/15/24 (R hip trochanteric bursectomy with gluteus medius repair and IT band transfer), Lumbar surgery, R THR, HTN      Daily Treatment Diary:      Initial Evaluation Date: 25  Compliance 3/11 3/13 3/19 3/21 3/24 3/28       Visit Number 1 2 3 4 5 6       Re-Eval  Y IE                 Foto Captured Y                       3/11 2 3/19 3/21 3/24 3/28       Manual             L Hip PROM- ant hip prec nv                         STM L  scar, hip / lumbar soft tissue -- nv nv Completed pc with educ for HEP also pc Pc 15m       Ther-Ex                    nustep 6m L 2 L4 8m L3 5m, LBE 5 m L1 L3 5m, /LBE 5m L1 CD PT L3 5m/UBE 7m L1 L3 nustep 6m L2       T abd, flex, leg curl 5x 15 ea 10x ea -  2 x 10 0# each LE       BLE to U LE bridge with TA set -  2 x 5 2\" hand A 2 x 10, 3\" hand A 2 x 10, no hand A nc       LAQ/SAQ 5x Laq2# 2 x 10, 2\" Clam shell 3 x 10  p BKFOs 10x10 Clam shell 3 x 10//bkfos with TA 2 x 10, 5\" Clam shell 2# 3 x 10, bkfos with TA 10x10\" nc       QSets 5x   Supine ASLR 3x5 L, 20x R Supine aslr 4 x 5L, 20x R nc       ASLRS  - St  15 x " "ea St 10 x ea, march 15x  H/l march 2 x 10, 2\" nc       Heel Slides -   SAQ 3\" L 2 x 10, 5\" Saq 3# 3 x 10, 3\" ea nc       Clam shell             Hip abd R S/L     AA  mod A of PT for task 2 x 10 Aa min to mod A of pt 2 x 15 -       Reviewed and issued HEP POC , tx plan, goals and aerobic ex, use of AD all times                   Neuro Re-Ed                    Mini squats   - 15x HR A   --     -       SLB floor  SLB Air Ex -            Step ups -  10x , 4\"   4\" 10 x 2 ea, lat L 2 x 10, R 5 p! CD       Step taps 4\" -            Weight shifting  F/B, S to S //bars             Ther-Act              STS 5x sts, tug slc pc 10x B UR A f! After nu step   --  -         Amb/gait tr   Stair amb U HR + SLC 5 x  non recip Stair amb U HR + SLC 1 lap, non recip Stair amb  B HR recip 6 laps 4-6\" -                     Modalities                    CP L hip                                                                     "

## 2025-03-31 ENCOUNTER — OFFICE VISIT (OUTPATIENT)
Dept: PHYSICAL THERAPY | Facility: CLINIC | Age: 75
End: 2025-03-31
Payer: MEDICARE

## 2025-03-31 DIAGNOSIS — G89.18 ACUTE POSTOPERATIVE PAIN OF LEFT HIP: ICD-10-CM

## 2025-03-31 DIAGNOSIS — M16.12 OSTEOARTHRITIS OF LEFT HIP, UNSPECIFIED OSTEOARTHRITIS TYPE: Primary | ICD-10-CM

## 2025-03-31 DIAGNOSIS — M25.552 ACUTE POSTOPERATIVE PAIN OF LEFT HIP: ICD-10-CM

## 2025-03-31 DIAGNOSIS — Z96.642 HISTORY OF LEFT HIP REPLACEMENT: ICD-10-CM

## 2025-03-31 DIAGNOSIS — R26.9 GAIT DIFFICULTY: ICD-10-CM

## 2025-03-31 PROCEDURE — 97110 THERAPEUTIC EXERCISES: CPT | Performed by: PHYSICAL THERAPIST

## 2025-03-31 PROCEDURE — 97140 MANUAL THERAPY 1/> REGIONS: CPT | Performed by: PHYSICAL THERAPIST

## 2025-03-31 PROCEDURE — 97112 NEUROMUSCULAR REEDUCATION: CPT | Performed by: PHYSICAL THERAPIST

## 2025-03-31 NOTE — PROGRESS NOTES
"Daily Note     Today's date: 3/31/2025  Patient name: RACHNA Pulido  : 1950  MRN: [id number]  Referring provider: Nilo Ramos MD  Dx:   Encounter Diagnosis     ICD-10-CM    1. Osteoarthritis of left hip, unspecified osteoarthritis type  M16.12       2. History of left hip replacement  Z96.642       3. Gait difficulty  R26.9       4. Acute postoperative pain of left hip  G89.18     M25.552                      Subjective: Pt notes back pain persists an dis limiting her functional WB tolerances. Requiring use or pain medication.   She has appt for PCP Wednesday and referral for pain management. Pt notes left hip is doing very well and she cont to use SLC in L UE for all amb.       Objective: See treatment diary below. Pt completed tasks with no pain complaints, just muscle fatigue with exercise. She presents with Soft tissue scar mobility limitations addressed with STM. Pt progressed to h/l marches with tband resistance this date. Pt amb with flexed trunk posturing able to improve with verbal cues. Cont to use SLC in LUE. Pt amb on stairs with reciprocal gait with UHR and SLC assist and B HR assist with good tolerance. Fatigue with step ups 6\" with 10 reps  and some R knee \"pulling\" with out pain.       Assessment: Tolerated treatment well. She noted no  back pain pre and during tx and was able to complete 5 m recumbent pedaling for conditioning this date. Patient would benefit from continued PT, skilled care to achieve goals.       Plan: Continue per plan of care.      Precautions: DOS:  L THR ANT approach 25,  DOS 2/15/24 (R hip trochanteric bursectomy with gluteus medius repair and IT band transfer), Lumbar surgery, R THR, HTN      Daily Treatment Diary:      Initial Evaluation Date: 25  Compliance 3/11 3/13 3/19 3/21 3/24 3/28 3/31      Visit Number 1 2 3 4 5 6 7      Re-Eval  Y IE                 Foto Captured Y                       3/11 2 3/19 3/21 3/24 3/28 3/31      Manual           " "  L Hip PROM- ant hip prec nv      10m                   STM L  scar, hip / lumbar soft tissue -- nv nv Completed pc with educ for HEP also pc Pc 15m Pc 10m      Ther-Ex                    nustep 6m L 2 L4 8m L3 5m, LBE 5 m L1 L3 5m, /LBE 5m L1 CD PT L3 5m/UBE 7m L1 L3 nustep 6m L2 L3 nustep  5m, LBE5m L1      T abd, flex, leg curl 5x 15 ea 10x ea -  2 x 10 0# each LE --      BLE to U LE bridge with TA set -  2 x 5 2\" hand A 2 x 10, 3\" hand A 2 x 10, no hand A nc Post chain isos 2 x 10, 5\"      LAQ/SAQ 5x Laq2# 2 x 10, 2\" Clam shell 3 x 10  p BKFOs 10x10 Clam shell 3 x 10//bkfos with TA 2 x 10, 5\" Clam shell 2# 3 x 10, bkfos with TA 10x10\" nc Bkfos 10x10\" stretch with ta/saq 3# B      QSets 5x   Supine ASLR 3x5 L, 20x R Supine aslr 4 x 5L, 20x R nc -      ASLRS  - St  15 x ea St 10 x ea, march 15x  H/l march 2 x 10, 2\" nc H/l march with gtb 2 x 10, 2\"      Heel Slides -   SAQ 3\" L 2 x 10, 5\" Saq 3# 3 x 10, 3\" ea nc Saq 3#  3 x 10, 3\"      Clam shell       H/l gtb 2 x 10, 5\" red plyoball reaches and TA      Hip abd R S/L     AA  mod A of PT for task 2 x 10 Aa min to mod A of pt 2 x 15 -  Supine a slides 2 x 10      Reviewed and issued HEP POC , tx plan, goals and aerobic ex, use of AD all times                   Neuro Re-Ed                    Mini squats   - 15x HR A   --     -       SLB floor  SLB Air Ex -            Step ups -  10x , 4\"   4\" 10 x 2 ea, lat L 2 x 10, R 5 p! CD 6\" 2 x 10 ea      Step taps 4\" -      Stair amb 5 laps U HR +slc, BHR recip      Weight shifting  F/B, S to S //bars             Ther-Act              STS 5x sts, tug slc pc 10x B UR A f! After nu step   --  -         Amb/gait tr   Stair amb U HR + SLC 5 x  non recip Stair amb U HR + SLC 1 lap, non recip Stair amb  B HR recip 6 laps 4-6\" -                     Modalities                    CP L hip                                                                       "

## 2025-04-02 ENCOUNTER — OFFICE VISIT (OUTPATIENT)
Dept: PHYSICAL THERAPY | Facility: CLINIC | Age: 75
End: 2025-04-02
Payer: MEDICARE

## 2025-04-02 DIAGNOSIS — M25.552 ACUTE POSTOPERATIVE PAIN OF LEFT HIP: ICD-10-CM

## 2025-04-02 DIAGNOSIS — M16.12 OSTEOARTHRITIS OF LEFT HIP, UNSPECIFIED OSTEOARTHRITIS TYPE: Primary | ICD-10-CM

## 2025-04-02 DIAGNOSIS — Z96.642 HISTORY OF LEFT HIP REPLACEMENT: ICD-10-CM

## 2025-04-02 DIAGNOSIS — G89.18 ACUTE POSTOPERATIVE PAIN OF LEFT HIP: ICD-10-CM

## 2025-04-02 DIAGNOSIS — R26.9 GAIT DIFFICULTY: ICD-10-CM

## 2025-04-02 PROCEDURE — 97530 THERAPEUTIC ACTIVITIES: CPT | Performed by: PHYSICAL THERAPIST

## 2025-04-02 PROCEDURE — 97140 MANUAL THERAPY 1/> REGIONS: CPT | Performed by: PHYSICAL THERAPIST

## 2025-04-02 PROCEDURE — 97110 THERAPEUTIC EXERCISES: CPT | Performed by: PHYSICAL THERAPIST

## 2025-04-02 NOTE — PROGRESS NOTES
"Daily Note     Today's date: 2025  Patient name: RACHNA Pulido  : 1950  MRN: [id number]  Referring provider: Nilo Ramos MD  Dx:   Encounter Diagnosis     ICD-10-CM    1. Osteoarthritis of left hip, unspecified osteoarthritis type  M16.12       2. History of left hip replacement  Z96.642       3. Gait difficulty  R26.9       4. Acute postoperative pain of left hip  G89.18     M25.552                      Subjective: Pt notes improved R LBP but still persists and impacts stand and functional WB tolerances, worse in am. Appt with pain mgmt next week. Pt is resuming cymbalta as per her PCP.       Objective: See treatment diary below. RLB pain limited amb tolerances with out AD, noted no difficulty with use of SLC in LUE.       Assessment: Tolerated treatment well. Patient would benefit from continued PT, skilled care to achieve goals.       Plan: Continue per plan of care.      Precautions: DOS:  L THR ANT approach 25,  DOS 2/15/24 (R hip trochanteric bursectomy with gluteus medius repair and IT band transfer), Lumbar surgery, R THR, HTN      Daily Treatment Diary:      Initial Evaluation Date: 25  Compliance 3/11 3/13 3/19 3/21 3/24 3/28 3/31 4/2     Visit Number 1 2 3 4 5 6 7 8     Re-Eval  Y IE                 Foto Captured Y                       3/11 2 3/19 3/21 3/24 3/28 3/31 4/2     Manual             L Hip PROM- ant hip prec nv      10m 10m                  STM L  scar, hip / lumbar soft tissue -- nv nv Completed pc with educ for HEP also pc Pc 15m Pc 10m 10m     Ther-Ex                    nustep 6m L 2 L4 8m L3 5m, LBE 5 m L1 L3 5m, /LBE 5m L1 CD PT L3 5m/UBE 7m L1 L3 nustep 6m L2 L3 nustep  5m, LBE5m L1 L3 nustep 5m, LBEwarm up 5m     T abd, flex, leg curl 5x 15 ea 10x ea -  2 x 10 0# each LE -- -     BLE to U LE bridge with TA set -  2 x 5 2\" hand A 2 x 10, 3\" hand A 2 x 10, no hand A nc Post chain isos 2 x 10, 5\" Post chain isos  2 x 10,5\", mini bridge 5 x 4, 2\"     LAQ/SAQ 5x " "Laq2# 2 x 10, 2\" Clam shell 3 x 10  p BKFOs 10x10 Clam shell 3 x 10//bkfos with TA 2 x 10, 5\" Clam shell 2# 3 x 10, bkfos with TA 10x10\" nc Bkfos 10x10\" stretch with ta/saq 3# B Bkfos 10x10\" stretch with ta/saq 3# B     QSets 5x   Supine ASLR 3x5 L, 20x R Supine aslr 4 x 5L, 20x R nc -      ASLRS  - St  15 x ea St 10 x ea, march 15x  H/l march 2 x 10, 2\" nc H/l march with gtb 2 x 10, 2\" H/l march with gtb 2 x 10, 2\"     Heel Slides -   SAQ 3\" L 2 x 10, 5\" Saq 3# 3 x 10, 3\" ea nc Saq 3#  3 x 10, 3\"      Clam shell       H/l gtb 2 x 10, 5\" red plyoball reaches and TA H/l gtb 2 x 10, 5\"     Hip abd R S/L     AA  mod A of PT for task 2 x 10 Aa min to mod A of pt 2 x 15 -  Supine a slides 2 x 10 Supine a slides 2 x 10     Reviewed and issued HEP POC , tx plan, goals and aerobic ex, use of AD all times                   Neuro Re-Ed                    Mini squats   - 15x HR A   --     -       SLB floor  SLB Air Ex -            Step ups -  10x , 4\"   4\" 10 x 2 ea, lat L 2 x 10, R 5 p! CD 6\" 2 x 10 ea 6\"  2 x 10 ea, lat 2 x 10, 6\"      Step taps 4\" -      Stair amb 5 laps U HR +slc, BHR recip -     Weight shifting  F/B, S to S //bars             Ther-Act              STS 5x sts, tug slc pc 10x B UR A f! After nu step   --  -         Amb/gait tr   Stair amb U HR + SLC 5 x  non recip Stair amb U HR + SLC 1 lap, non recip Stair amb  B HR recip 6 laps 4-6\" -  Stride length in //bars 4 laps p!                   Modalities                    CP L hip                                                                         "

## 2025-04-04 ENCOUNTER — OFFICE VISIT (OUTPATIENT)
Dept: PHYSICAL THERAPY | Facility: CLINIC | Age: 75
End: 2025-04-04
Payer: MEDICARE

## 2025-04-04 DIAGNOSIS — M16.12 OSTEOARTHRITIS OF LEFT HIP, UNSPECIFIED OSTEOARTHRITIS TYPE: Primary | ICD-10-CM

## 2025-04-04 DIAGNOSIS — R26.9 GAIT DIFFICULTY: ICD-10-CM

## 2025-04-04 DIAGNOSIS — Z96.642 HISTORY OF LEFT HIP REPLACEMENT: ICD-10-CM

## 2025-04-04 DIAGNOSIS — G89.18 ACUTE POSTOPERATIVE PAIN OF LEFT HIP: ICD-10-CM

## 2025-04-04 DIAGNOSIS — M25.552 ACUTE POSTOPERATIVE PAIN OF LEFT HIP: ICD-10-CM

## 2025-04-04 PROCEDURE — 97110 THERAPEUTIC EXERCISES: CPT

## 2025-04-04 PROCEDURE — 97140 MANUAL THERAPY 1/> REGIONS: CPT

## 2025-04-04 PROCEDURE — 97112 NEUROMUSCULAR REEDUCATION: CPT

## 2025-04-04 NOTE — PROGRESS NOTES
Daily Note     Today's date: 2025  Patient name: RACHNA Pulido  : 1950  MRN: [id number]  Referring provider: Nilo Ramos MD  Dx:   Encounter Diagnosis     ICD-10-CM    1. Osteoarthritis of left hip, unspecified osteoarthritis type  M16.12       2. History of left hip replacement  Z96.642       3. Gait difficulty  R26.9       4. Acute postoperative pain of left hip  G89.18     M25.552           Start Time: 1100  Stop Time: 1156  Total time in clinic (min): 56 minutes    Subjective: Pt reports her L hip continues to do well, but biggest aggravating factor at this time is her LB pain.  Has a consultation with pain management next week.        Objective: See treatment diary below      Assessment: Tolerated treatment well. Continued with program as outlined below.  Visible limitations in glute strength noted, especially during mini bridges and fwd/lat step downs.  Reps during fwd/lat step downs reduced d/t reported fatigue.  No significant increase in pain post treatment.  Patient would benefit from continued PT.        Plan: Continue per plan of care.      Precautions: DOS:  L THR ANT approach 25,  DOS 2/15/24 (R hip trochanteric bursectomy with gluteus medius repair and IT band transfer), Lumbar surgery, R THR, HTN      Daily Treatment Diary:      Initial Evaluation Date: 25  Compliance 3/11 3/13 3/19 3/21 3/24 3/28 3/31 4/2 4/4    Visit Number 1 2 3 4 5 6 7 8 9    Re-Eval  Y IE                 Foto Captured Y                       3/11 2 3/19 3/21 3/24 3/28 3/31 4/2 4/4    Manual             L Hip PROM- ant hip prec nv      10m 10m 10 min  AFB                 STM L  scar, hip / lumbar soft tissue -- nv nv Completed pc with educ for HEP also pc Pc 15m Pc 10m 10m 10 min  AFB    Ther-Ex                    nustep 6m L 2 L4 8m L3 5m, LBE 5 m L1 L3 5m, /LBE 5m L1 CD PT L3 5m/UBE 7m L1 L3 nustep 6m L2 L3 nustep  5m, LBE5m L1 L3 nustep 5m, LBEwarm up 5m L3 nustep 5m, LBEwarm up 5m    T abd,  "flex, leg curl 5x 15 ea 10x ea -  2 x 10 0# each LE -- -     BLE to U LE bridge with TA set -  2 x 5 2\" hand A 2 x 10, 3\" hand A 2 x 10, no hand A nc Post chain isos 2 x 10, 5\" Post chain isos  2 x 10,5\", mini bridge 5 x 4, 2\" Mini bridge  2 x 10, 3\" hand A    LAQ/SAQ 5x Laq2# 2 x 10, 2\" Clam shell 3 x 10  p BKFOs 10x10 Clam shell 3 x 10//bkfos with TA 2 x 10, 5\" Clam shell 2# 3 x 10, bkfos with TA 10x10\" nc Bkfos 10x10\" stretch with ta/saq 3# B Bkfos 10x10\" stretch with ta/saq 3# B Bkfos 10x10\" stretch with ta/saq 3# B  3x10    QSets 5x   Supine ASLR 3x5 L, 20x R Supine aslr 4 x 5L, 20x R nc -      ASLRS  - St  15 x ea St 10 x ea, march 15x  H/l march 2 x 10, 2\" nc H/l march with gtb 2 x 10, 2\" H/l march with gtb 2 x 10, 2\" H/l march with gtb 2 x 10, 2\"    Heel Slides -   SAQ 3\" L 2 x 10, 5\" Saq 3# 3 x 10, 3\" ea nc Saq 3#  3 x 10, 3\"      Clam shell       H/l gtb 2 x 10, 5\" red plyoball reaches and TA H/l gtb 2 x 10, 5\" H/l gtb 2 x 10, 5\"    Hip abd R S/L     AA  mod A of PT for task 2 x 10 Aa min to mod A of pt 2 x 15 -  Supine a slides 2 x 10 Supine a slides 2 x 10 Supine a slides 2 x 10    Reviewed and issued HEP POC , tx plan, goals and aerobic ex, use of AD all times                   Neuro Re-Ed                    Mini squats   - 15x HR A   --     -       SLB floor  SLB Air Ex -            Step ups -  10x , 4\"   4\" 10 x 2 ea, lat L 2 x 10, R 5 p! CD 6\" 2 x 10 ea 6\"  2 x 10 ea, lat 2 x 10, 6\"  6\"  1 x 10 ea, lat 1 x 10, 6\"     Step taps 4\" -      Stair amb 5 laps U HR +slc, BHR recip -     Weight shifting  F/B, S to S //bars             Ther-Act              STS 5x sts, tug slc pc 10x B UR A f! After nu step   --  -         Amb/gait tr   Stair amb U HR + SLC 5 x  non recip Stair amb U HR + SLC 1 lap, non recip Stair amb  B HR recip 6 laps 4-6\" -  Stride length in //bars 4 laps p!                   Modalities                    CP L hip                                                                           "

## 2025-04-07 ENCOUNTER — OFFICE VISIT (OUTPATIENT)
Dept: PHYSICAL THERAPY | Facility: CLINIC | Age: 75
End: 2025-04-07
Payer: MEDICARE

## 2025-04-07 DIAGNOSIS — G89.18 ACUTE POSTOPERATIVE PAIN OF LEFT HIP: ICD-10-CM

## 2025-04-07 DIAGNOSIS — M25.552 ACUTE POSTOPERATIVE PAIN OF LEFT HIP: ICD-10-CM

## 2025-04-07 DIAGNOSIS — Z96.642 HISTORY OF LEFT HIP REPLACEMENT: ICD-10-CM

## 2025-04-07 DIAGNOSIS — M16.12 OSTEOARTHRITIS OF LEFT HIP, UNSPECIFIED OSTEOARTHRITIS TYPE: Primary | ICD-10-CM

## 2025-04-07 DIAGNOSIS — R26.9 GAIT DIFFICULTY: ICD-10-CM

## 2025-04-07 PROCEDURE — 97140 MANUAL THERAPY 1/> REGIONS: CPT | Performed by: PHYSICAL THERAPIST

## 2025-04-07 PROCEDURE — 97110 THERAPEUTIC EXERCISES: CPT | Performed by: PHYSICAL THERAPIST

## 2025-04-07 PROCEDURE — 97112 NEUROMUSCULAR REEDUCATION: CPT | Performed by: PHYSICAL THERAPIST

## 2025-04-07 PROCEDURE — 97530 THERAPEUTIC ACTIVITIES: CPT | Performed by: PHYSICAL THERAPIST

## 2025-04-07 NOTE — PROGRESS NOTES
Daily Note     Today's date: 2025  Patient name: RACHNA Pulido  : 1950  MRN: [id number]  Referring provider: Nilo Ramos MD  Dx:   Encounter Diagnosis     ICD-10-CM    1. Osteoarthritis of left hip, unspecified osteoarthritis type  M16.12       2. History of left hip replacement  Z96.642       3. Gait difficulty  R26.9       4. Acute postoperative pain of left hip  G89.18     M25.552           Start Time: 915          Subjective: Pt notes R LBP as limiting this am with sit to stand, standing and amb with upright posturing. Noted good tolerance to tx Friday and states over weekend using TENS and HP to assist with LBP. Pt notes she wishes to walk with out any AD but feels needs cane currently and cont to use in L UE. Pt states her L hip is doing very well.       Objective: See treatment diary below. Pre tx pain 2-4/10 R LB- used HP to warm up pre tx seated to L/S spine. Pt completed h/l ex as documented with PROM completed to tolerance with stretch reported, no pain in L hip or lower back, with task.  Pt progressed with L hip active lift up and out/in with min A of PT at ankle secondary to hip flexor muscle fatigue.  Pt completed R S/L hip  clam shells with 3#  and AA hip abd with knee extended with min A Of PT  completed and cueing for form and muscle activation required.  Pt completed gait training, dynamic gait activity in and out of parallel bars. Pt does demonstrate a dec in stance time RLE  with no AD In LUE  noting R LBP as possibly limiting. Pt is able to complete with LUE in cane or on parallel bar with improved gait mechanics and stance time RLE.  Pt required cues for form and muscle activation, stride length with gait activity.  Pt noted LE and trunk extensor  fatigue post tx and R LB soreness 2/10. Pt now using LBE at home 2 x per day 10m and 20 m with good tolerance.      Assessment: Tolerated treatment well. Patient demonstrated fatigue post treatment and would benefit from continued  "PT, skilled care to achieve outlined goals of care. Pt R LBP limits ability to achieve goal of no AD today. Pt educated to cont use of SLC in LUE to assist with gait mechanics and she is in agreement.  Pt does have appt with pain management new provider in 2 days and may benefit to gain improved control of chronic LBP sx.  Pt L hip is doing very well and progressing toward goal achievement.       Plan: Continue per plan of care.      Precautions: DOS:  L THR ANT approach 2/20/25,  DOS 2/15/24 (R hip trochanteric bursectomy with gluteus medius repair and IT band transfer), Lumbar surgery, R THR, HTN      Daily Treatment Diary:     Initial Evaluation Date: 03/11/25  Compliance  3/13 3/19 3/21 3/24 3/28 3/31 4/2 4/4 4/7   Visit Number 11 2 3 4 5 6 7 8 9 10   Re-Eval  REEVAL                 Foto Captured Y                        2 3/19 3/21 3/24 3/28 3/31 4/2 4/4 4/7   Manual             L Hip PROM- ant hip prec nv      10m 10m 10 min  AFB 10 m pc                STM L  scar, hip / lumbar soft tissue -- nv nv Completed pc with educ for HEP also pc Pc 15m Pc 10m 10m 10 min  AFB 8m pc   Ther-Ex                    nustep 6m L 2 L4 8m L3 5m, LBE 5 m L1 L3 5m, /LBE 5m L1 CD PT L3 5m/UBE 7m L1 L3 nustep 6m L2 L3 nustep  5m, LBE5m L1 L3 nustep 5m, LBEwarm up 5m L3 nustep 5m, LBEwarm up 5m HP warm up today 10 m l/s  spine   T abd, flex, leg curl 5x 15 ea 10x ea -  2 x 10 0# each LE -- -     BLE to U LE bridge with TA set -  2 x 5 2\" hand A 2 x 10, 3\" hand A 2 x 10, no hand A nc Post chain isos 2 x 10, 5\" Post chain isos  2 x 10,5\", mini bridge 5 x 4, 2\" Mini bridge  2 x 10, 3\" hand A Min bridge 2 x 10, 3\" , hand A   LAQ/SAQ 5x Laq2# 2 x 10, 2\" Clam shell 3 x 10  p BKFOs 10x10 Clam shell 3 x 10//bkfos with TA 2 x 10, 5\" Clam shell 2# 3 x 10, bkfos with TA 10x10\" nc Bkfos 10x10\" stretch with ta/saq 3# B Bkfos 10x10\" stretch with ta/saq 3# B Bkfos 10x10\" stretch with ta/saq 3# B  3x10 Bkfos 10x 10\" stretch with ta//saq 4# 3 x 10 B " "  QSets 5x   Supine ASLR 3x5 L, 20x R Supine aslr 4 x 5L, 20x R nc -      ASLRS  - St  15 x ea St 10 x ea, march 15x  H/l march 2 x 10, 2\" nc H/l march with gtb 2 x 10, 2\" H/l march with gtb 2 x 10, 2\" H/l march with gtb 2 x 10, 2\" H/l march with gtb 2 x 10, 2\"   Heel Slides -   SAQ 3\" L 2 x 10, 5\" Saq 3# 3 x 10, 3\" ea nc Saq 3#  3 x 10, 3\"   Clam shells 3# 2 x 10, aa hip abd knee ext 2 x 10 cues m activ   Clam shell       H/l gtb 2 x 10, 5\" red plyoball reaches and TA H/l gtb 2 x 10, 5\" H/l gtb 2 x 10, 5\" H/l btb 2 x 10, 5\"   Hip abd R S/L     AA  mod A of PT for task 2 x 10 Aa min to mod A of pt 2 x 15 -  Supine a slides 2 x 10 Supine a slides 2 x 10 Supine a slides 2 x 10 Supine abd slides 2 x 15   Reviewed and issued HEP POC , tx plan, goals and aerobic ex, use of AD all times                   Neuro Re-Ed                    Mini squats   - 15x HR A   --     -       SLB floor  SLB Air Ex -            Step ups -  10x , 4\"   4\" 10 x 2 ea, lat L 2 x 10, R 5 p! CD 6\" 2 x 10 ea 6\"  2 x 10 ea, lat 2 x 10, 6\"  6\"  1 x 10 ea, lat 1 x 10, 6\"  -   Step taps 4\" -      Stair amb 5 laps U HR +slc, BHR recip -     Weight shifting  F/B, S to S //bars             Ther-Act              STS 5x sts, tug slc pc 10x B UR A f! After nu step   --  -         Amb/gait tr   Stair amb U HR + SLC 5 x  non recip Stair amb U HR + SLC 1 lap, non recip Stair amb  B HR recip 6 laps 4-6\" -  Stride length in //bars 4 laps p!  Stide length, posture/m activ parallel bars 4 laps forw/lat/back             Amb out of //bars 40 ft. X 2 cues m activ cs pc    Modalities                    CP L hip                                                                             "

## 2025-04-09 ENCOUNTER — EVALUATION (OUTPATIENT)
Dept: PHYSICAL THERAPY | Facility: CLINIC | Age: 75
End: 2025-04-09
Payer: MEDICARE

## 2025-04-09 DIAGNOSIS — Z96.642 HISTORY OF LEFT HIP REPLACEMENT: ICD-10-CM

## 2025-04-09 DIAGNOSIS — M25.552 ACUTE POSTOPERATIVE PAIN OF LEFT HIP: ICD-10-CM

## 2025-04-09 DIAGNOSIS — G89.18 ACUTE POSTOPERATIVE PAIN OF LEFT HIP: ICD-10-CM

## 2025-04-09 DIAGNOSIS — M16.12 OSTEOARTHRITIS OF LEFT HIP, UNSPECIFIED OSTEOARTHRITIS TYPE: Primary | ICD-10-CM

## 2025-04-09 DIAGNOSIS — R26.9 GAIT DIFFICULTY: ICD-10-CM

## 2025-04-09 PROCEDURE — 97110 THERAPEUTIC EXERCISES: CPT | Performed by: PHYSICAL THERAPIST

## 2025-04-09 PROCEDURE — 97530 THERAPEUTIC ACTIVITIES: CPT | Performed by: PHYSICAL THERAPIST

## 2025-04-09 NOTE — PROGRESS NOTES
PT Re-Evaluation     Today's date: 2024  Patient name: RACHNA Pulido  : 1950  MRN: [id number]  Referring provider: Nilo Ramos MD  Dx:   Encounter Diagnosis     ICD-10-CM    1. Osteoarthritis of left hip, unspecified osteoarthritis type  M16.12       2. History of left hip replacement  Z96.642       3. Gait difficulty  R26.9       4. Acute postoperative pain of left hip  G89.18     M25.552                                Assessment  Impairments: abnormal gait, abnormal or restricted ROM, impaired physical strength, poor posture  and endurance  Functional limitations: 5 x sit to stand  20.6  with use of B UE on chair seat, hesitancy with initation of amb following sit to stand  Symptom irritability: low  Irritability comments: left hip    Assessment details: The patient is a 75 y/o female who presents to PT s/p L THR  25 after discharge from home PT 3/7/2025. Pt presents with gait dysfunction involving RLE Greater than L,  healing incision, decrease in L hip ROM and strength, balance of single limb, decrease in amb endurance, gait dysfunction on levels and stairs, decrease in standing time and ability to complete ADLs.  Pt has hx of chronic back pain, lumbar surgery including fusion and current thoracolumbar scoliosis, R hip surgery (R hip trochanteric bursectomy with gluteus medius repair and IT band transfer) on 2/15/24.  She has complaints of intermittent pulling/pain in her  L hip.  She demonstrates deficits with decreased ROM and strength, decreased flexibility, decreased balance and proprioception and edema.  These deficits lead to difficulty with stair negotiation, gait dysfunction and pain with completing her ADLs and tasks at home.  Pt amb   with SLC  60ft distance with dec NIMESH, dec stride length, dec RLE ankle df/pf, decrease trunk rotation and RUE arm swing.  Pt will benefit from  skilled care to achieve PT goals of care.         Update 2025- Pt has been treated in PT 2-3 x  per week since IE and is progressing toward all goals for L hip. She demonstrates improved hip ROM, improved hip strength, improved core strength, improved scar mobility, decrease in pain. She notes no specific functional limitations with L hip at this time but finds R LBP as limiting with standing and walking function. She cont to use SLC in LUE to assist with gait mechanics and RLBP.  SLB LLE current =  6-7   seconds.  Unable RLE without L UE A secondary to + Trendelenberg. Pt will cont to benefit from skilled care to achieve outlined goals of care. Potential for pain management interventions to assist with management of chronic back pain with cont strength of core and B Hips anticipated to improved functional WB tolerances.   Understanding of Dx/Px/POC: excellent     Prognosis: excellent    Goals  STGs:  1.  Initiate and complete HEP with verbal cues- achieved  2.  Improve L hip ROM by 5-10 degrees flexion, abduction, R ankle pf, df- achieved  3.  Improve L and R  LE strength by 1/2 grade  for hip flexion, extension, abduction, R and L knee flex, ext, B ankle pf,df achieved  4.  Decrease L hip pain by 25% -achieved  5. SLB either LE = 10-15 seconds- current SLB LLE    seocnds  LTGs: progressing  1.  Patient to be I with HEP in 12 weeks.  2.  Improve L  Hip ROM to WFLs  t/o in 12 weeks to improve function.  3.  Improve L  and R  LE strength 4 to 4+/5 t/o in 12 weeks to improve function.  4.  Decrease L  hip pain to < or = to 1/10 with activity in 12 weeks to improve function.   5.  Patient to ambulate with normalized gait pattern without AD in 12 weeks.- 6 minute gait 3.5  hallway laps SLC fatigue  6.  Stair negotiation is improved to reciprocal  with U HR A in 12 weeks - achieved with UHR and SLC use               Plan  Patient would benefit from: skilled physical therapy  Planned modality interventions: cryotherapy    Planned therapy interventions: abdominal trunk stabilization, gait training, functional ROM  "exercises, stretching, strengthening, neuromuscular re-education, manual therapy, home exercise program, therapeutic exercise, postural training, patient education, self care and balance/weight bearing training    Frequency: 3x week (2-3 x per week)  Duration in weeks: 4  Plan of Care beginning date: 4/9/2025  Plan of Care expiration date: 5/7/2025  Treatment plan discussed with: patient  Plan details:          Subjective Evaluation    History of Present Illness  Mechanism of injury:  Pt underwent L THR 2/20/25 and discharged to home 2/21/25. She is using shower chair , toilet frame, walker to amb long distances.  She is using medications of  tramadol and tylenol x strength to manage her pain. Pt notes her legs \"finally feel equal length\" for first time since undergoing R THR years ago. Pt did receive home care PT and was discharged Friday, March 7. Pt notes she ihas resolve of pre operative  L hip pain sx.  She notes some chronic LBP and prior hx of spinal surgery. Pt notes she does feel less endurance for standing and walking since surgery but is slowly improving.  She notes sleeping well.  She follows with surgeon tomorrow. She is using cane for all amb in L UE, stating  tried to use in RUE but gait is not good with use in RUE.  Pt states she is amb on stairs nonreciprocal gait. She notes  assists with  all home keeping tasks.     Update 4/9/2025- Pt reports left hip is doing well and becoming stronger. She notes good pain control of L hip and improving scar mobility. Pt notes standing and walking not limited by L hip. Pt notes she is amb on stairs with reciprocal gait and BUE A of HR or U UE HR and SLC assist. Pt notes R sided LBP as limiting with weight bearing function. She notes being very pleased with improved ability regarding L hip.  She is riding stationary bike 2 x per day at home and notes some improvements with endurance. She is  not walking long distances or for exercise at this time secondary " to LBP. Pt is working with her PCP and upcoming appt with pain management specialist for her LBP.   Quality of life: excellent    Patient Goals  Patient goals for therapy: decreased pain, increased strength, increased motion and independence with ADLs/IADLs  Patient goal: to amb witho ut A device, improve on conditioning and fitness level to walk and standing  longer,  to stand to complete cooking, cleaning, ambulation longer and on stairs improved  Pain  Current pain ratin  At best pain ratin  At worst pain ratin  Location: pulling pain in incision L  without radiation  Quality: pressure and pulling  Relieving factors: ice, rest, change in position and medications  Aggravating factors: standing and walking  Progression: no change    Social Support  Steps to enter house: yes  1  Stairs in house: yes   Lives in: multiple-level home  Lives with: spouse    Employment status: not working (retired)  Hand dominance: right    Treatments  Previous treatment: physical therapy, medication and injection treatment  Discharged from (in last 30 days) comments: discahrged post op day 2 from hospital.         Objective     Observations     Additional Observation Details  8 cm incision with scar tissue hypomobility present about scar    Palpation   Left   Tenderness of the iliopsoas and lumbar paraspinals.     Right   Tenderness of the lumbar paraspinals.     Lumbar Screen  Lumbar range of motion within normal limits with the following exceptions:Decrease in lumbar extension mobility  severe, chronic    Neurological Testing     Sensation     Hip   Left Hip   Intact: light touch    Right Hip   Intact: light touch    Additional Neurological Details  Intermittent tremor RLE noted, pt reports she has spoken with her pcp regarding this     Active Range of Motion   Left Hip   Flexion: 100 degrees   Extension: 5 degrees   Abduction: 30 degrees     Right Hip   Flexion: 100 degrees   Extension: 5 degrees   Abduction: 30 degrees      Strength/Myotome Testing     Left Hip   Planes of Motion   Flexion: 4  Extension: 4-  Abduction: 4  Adduction: 4+    Right Hip   Planes of Motion   Extension: 4-  Abduction: 4-    Left Knee   Flexion: 4+  Extension: 4+    Right Knee   Flexion: 3-  Extension: 3-  Quadriceps contraction: fair    Left Ankle/Foot   Dorsiflexion: 4+  Plantar flexion: 4+  Inversion: 4+  Eversion: 4+  Great toe flexion: 4+  Great toe extension: 4+    Right Ankle/Foot   Dorsiflexion: 4+  Plantar flexion: 4+  Eversion: 4+  Great toe flexion: 4+  Great toe extension: 4+    Additional Strength Details  R knee ext 4/5, flex 4/5  L knee ext 4/5, flex 4/5    Ambulation   Weight-Bearing Status   Assistive device used: single point cane and two-wheeled walker    Additional Weight-Bearing Status Details  Using SLC for household and short community distances, in LUE.  NBOS demonstrated with dec in R ankle df, pf through gait cycle.  Pt demonstrates safety with amb in tx  60 ft x 2 but does note some fatigue of Les at that distance with pre 3-4/10.   Pt reports uses walker at home for longer distances.   Update 4/9/2025- Pt is amb with SLC in LUE to assist with gait mechanics and pain regarding RLE/R LB. Left hip with negative trendelenberg. Pt trunk in flexion and rigid with no arm swing or trunk rotation during amb. Dec stance RLE. Pt able to amb short distances with AD use but gait deteriorates secondary to pain R LB.             Ambulation: Stairs   Ascend stairs: independent  Pattern: reciprocal  Railings: one rail  Descend stairs: independent  Railings: one rail    Observational Gait   Decreased walking speed, stride length, left step length and right step length.   Left foot contact pattern: heel to toe  Right foot contact pattern: foot flat  Left arm swing: decreased  Right arm swing: decreased  Base of support: decreased    Additional Observational Gait Details  Pt has slight trunk flexed posture with amb. Scoliosis through thoracolumbar  "spine observed.    General Comments:      Hip Comments   6 minute walk test 3.5 laps hallway fatigue, no pain         Precautions: DOS:  L THR ANT approach 2/20/25,  DOS 2/15/24 (R hip trochanteric bursectomy with gluteus medius repair and IT band transfer), Lumbar surgery, R THR, HTN      Daily Treatment Diary:     Initial Evaluation Date: 03/11/25  Compliance 4/9 3/13 3/19 3/21 3/24 3/28 3/31 4/2 4/4 4/7   Visit Number 11 2 3 4 5 6 7 8 9 10   Re-Eval  REEVAL                 Foto Captured Y                       4/9 2 3/19 3/21 3/24 3/28 3/31 4/2 4/4 4/7   Manual             L Hip PROM- ant hip prec       10m 10m 10 min  AFB 10 m pc                STM L  scar, hip / lumbar soft tissue pc nv nv Completed pc with educ for HEP also pc Pc 15m Pc 10m 10m 10 min  AFB 8m pc   Ther-Ex                    nustep  L4 8m L3 5m, LBE 5 m L1 L3 5m, /LBE 5m L1 CD PT L3 5m/UBE 7m L1 L3 nustep 6m L2 L3 nustep  5m, LBE5m L1 L3 nustep 5m, LBEwarm up 5m L3 nustep 5m, LBEwarm up 5m HP warm up today 10 m l/s  spine   T abd, flex, leg curl - 15 ea 10x ea -  2 x 10 0# each LE -- -     BLE to U LE bridge with TA set   2 x 5 2\" hand A 2 x 10, 3\" hand A 2 x 10, no hand A nc Post chain isos 2 x 10, 5\" Post chain isos  2 x 10,5\", mini bridge 5 x 4, 2\" Mini bridge  2 x 10, 3\" hand A Min bridge 2 x 10, 3\" , hand A   LAQ/SAQ - Laq2# 2 x 10, 2\" Clam shell 3 x 10  p BKFOs 10x10 Clam shell 3 x 10//bkfos with TA 2 x 10, 5\" Clam shell 2# 3 x 10, bkfos with TA 10x10\" nc Bkfos 10x10\" stretch with ta/saq 3# B Bkfos 10x10\" stretch with ta/saq 3# B Bkfos 10x10\" stretch with ta/saq 3# B  3x10 Bkfos 10x 10\" stretch with ta//saq 4# 3 x 10 B   QSets -   Supine ASLR 3x5 L, 20x R Supine aslr 4 x 5L, 20x R nc -      ASLRS  - St  15 x ea St 10 x ea, march 15x  H/l march 2 x 10, 2\" nc H/l march with gtb 2 x 10, 2\" H/l march with gtb 2 x 10, 2\" H/l march with gtb 2 x 10, 2\" H/l march with gtb 2 x 10, 2\"   Clam shells, hip abd with ext knee in s/l 3# 3 x 10, A with " "knee ext  3 x 10    SAQ 3\" L 2 x 10, 5\" Saq 3# 3 x 10, 3\" ea nc Saq 3#  3 x 10, 3\"   Clam shells 3# 2 x 10, aa hip abd knee ext 2 x 10 cues m activ   Clam shell tband h/l nv      H/l gtb 2 x 10, 5\" red plyoball reaches and TA H/l gtb 2 x 10, 5\" H/l gtb 2 x 10, 5\" H/l btb 2 x 10, 5\"   Standing fire hydrants mod.  nv   AA  mod A of PT for task 2 x 10 Aa min to mod A of pt 2 x 15 -  Supine a slides 2 x 10 Supine a slides 2 x 10 Supine a slides 2 x 10 Supine abd slides 2 x 15   Reviewed and issued HEP POC , tx plan, goals and aerobic ex, use of AD all times                   Neuro Re-Ed                    Mini squats       --     -       SLB floor  SLB Air Ex 3 x 10 each LE no to 1 UE A floor            Step ups -  10x , 4\"   4\" 10 x 2 ea, lat L 2 x 10, R 5 p! CD 6\" 2 x 10 ea 6\"  2 x 10 ea, lat 2 x 10, 6\"  6\"  1 x 10 ea, lat 1 x 10, 6\"  -   Step taps 4\"       Stair amb 5 laps U HR +slc, BHR recip -     Weight shifting  F/B, S to S //bars             Ther-Act              STS 2 x 5 sts muscle activ 10x B UR A f! After nu step   --  -         Amb/gait tr 6 min walk test  Stair amb U HR + SLC 5 x  non recip Stair amb U HR + SLC 1 lap, non recip Stair amb  B HR recip 6 laps 4-6\" -  Stride length in //bars 4 laps p!  Stide length, posture/m activ parallel bars 4 laps forw/lat/back             Amb out of //bars 40 ft. X 2 cues m activ cs pc    Modalities                    CP L hip HP pre tx LB seated 10m                                                                            "

## 2025-04-09 NOTE — LETTER
April 10, 2025    Nilo Ramos MD  [address]  [facility]     Patient: RACHNA Pulido   YOB: 1950   Date of Visit: 2025     Encounter Diagnosis     ICD-10-CM    1. Osteoarthritis of left hip, unspecified osteoarthritis type  M16.12       2. History of left hip replacement  Z96.642       3. Gait difficulty  R26.9       4. Acute postoperative pain of left hip  G89.18     M25.552           Dear Dr. Nilo Ramos MD:    Thank you for your recent referral of RACHNA Pulido. Please review the attached evaluation summary from A Porsche's recent visit.     Please verify that you agree with the plan of care by signing the attached order.     If you have any questions or concerns, please do not hesitate to call.     I sincerely appreciate the opportunity to share in the care of one of your patients and hope to have another opportunity to work with you in the near future.       Sincerely,    Charito Nunn, PT      Referring Provider:      I certify that I have read the below Plan of Care and certify the need for these services furnished under this plan of treatment while under my care.                    Nilo Ramos MD  [address]  [facility]   Via Fax: [phone]          PT Re-Evaluation     Today's date: 2024  Patient name: RACHNA Pulido  : 1950  MRN: [id number]  Referring provider: Nilo Ramos MD  Dx:   Encounter Diagnosis     ICD-10-CM    1. Osteoarthritis of left hip, unspecified osteoarthritis type  M16.12       2. History of left hip replacement  Z96.642       3. Gait difficulty  R26.9       4. Acute postoperative pain of left hip  G89.18     M25.552                                Assessment  Impairments: abnormal gait, abnormal or restricted ROM, impaired physical strength, poor posture  and endurance  Functional limitations: 5 x sit to stand  20.6  with use of B UE on chair seat, hesitancy with initation of amb following  sit to stand  Symptom irritability: low  Irritability comments: left hip    Assessment details: The patient is a 75 y/o female who presents to PT s/p L THR  2/20/25 after discharge from home PT 3/7/2025. Pt presents with gait dysfunction involving RLE Greater than L,  healing incision, decrease in L hip ROM and strength, balance of single limb, decrease in amb endurance, gait dysfunction on levels and stairs, decrease in standing time and ability to complete ADLs.  Pt has hx of chronic back pain, lumbar surgery including fusion and current thoracolumbar scoliosis, R hip surgery (R hip trochanteric bursectomy with gluteus medius repair and IT band transfer) on 2/15/24.  She has complaints of intermittent pulling/pain in her  L hip.  She demonstrates deficits with decreased ROM and strength, decreased flexibility, decreased balance and proprioception and edema.  These deficits lead to difficulty with stair negotiation, gait dysfunction and pain with completing her ADLs and tasks at home.  Pt amb   with SLC  60ft distance with dec NIMESH, dec stride length, dec RLE ankle df/pf, decrease trunk rotation and RUE arm swing.  Pt will benefit from  skilled care to achieve PT goals of care.         Update 4/9/2025- Pt has been treated in PT 2-3 x per week since IE and is progressing toward all goals for L hip. She demonstrates improved hip ROM, improved hip strength, improved core strength, improved scar mobility, decrease in pain. She notes no specific functional limitations with L hip at this time but finds R LBP as limiting with standing and walking function. She cont to use SLC in LUE to assist with gait mechanics and RLBP.  SLB LLE current =  6-7   seconds.  Unable RLE without L UE A secondary to + Trendelenberg. Pt will cont to benefit from skilled care to achieve outlined goals of care. Potential for pain management interventions to assist with management of chronic back pain with cont strength of core and B Hips  anticipated to improved functional WB tolerances.   Understanding of Dx/Px/POC: excellent     Prognosis: excellent    Goals  STGs:  1.  Initiate and complete HEP with verbal cues- achieved  2.  Improve L hip ROM by 5-10 degrees flexion, abduction, R ankle pf, df- achieved  3.  Improve L and R  LE strength by 1/2 grade  for hip flexion, extension, abduction, R and L knee flex, ext, B ankle pf,df achieved  4.  Decrease L hip pain by 25% -achieved  5. SLB either LE = 10-15 seconds- current SLB LLE    seocnds  LTGs: progressing  1.  Patient to be I with HEP in 12 weeks.  2.  Improve L  Hip ROM to WFLs  t/o in 12 weeks to improve function.  3.  Improve L  and R  LE strength 4 to 4+/5 t/o in 12 weeks to improve function.  4.  Decrease L  hip pain to < or = to 1/10 with activity in 12 weeks to improve function.   5.  Patient to ambulate with normalized gait pattern without AD in 12 weeks.- 6 minute gait 3.5  hallway laps SLC fatigue  6.  Stair negotiation is improved to reciprocal  with U HR A in 12 weeks - achieved with UHR and SLC use               Plan  Patient would benefit from: skilled physical therapy  Planned modality interventions: cryotherapy    Planned therapy interventions: abdominal trunk stabilization, gait training, functional ROM exercises, stretching, strengthening, neuromuscular re-education, manual therapy, home exercise program, therapeutic exercise, postural training, patient education, self care and balance/weight bearing training    Frequency: 3x week (2-3 x per week)  Duration in weeks: 4  Plan of Care beginning date: 4/9/2025  Plan of Care expiration date: 5/7/2025  Treatment plan discussed with: patient  Plan details:          Subjective Evaluation    History of Present Illness  Mechanism of injury:  Pt underwent L THR 2/20/25 and discharged to home 2/21/25. She is using shower chair , toilet frame, walker to amb long distances.  She is using medications of  tramadol and tylenol x strength to  "manage her pain. Pt notes her legs \"finally feel equal length\" for first time since undergoing R THR years ago. Pt did receive home care PT and was discharged . Pt notes she ihas resolve of pre operative  L hip pain sx.  She notes some chronic LBP and prior hx of spinal surgery. Pt notes she does feel less endurance for standing and walking since surgery but is slowly improving.  She notes sleeping well.  She follows with surgeon tomorrow. She is using cane for all amb in L UE, stating  tried to use in RUE but gait is not good with use in RUE.  Pt states she is amb on stairs nonreciprocal gait. She notes  assists with  all home keeping tasks.     Update 2025- Pt reports left hip is doing well and becoming stronger. She notes good pain control of L hip and improving scar mobility. Pt notes standing and walking not limited by L hip. Pt notes she is amb on stairs with reciprocal gait and BUE A of HR or U UE HR and SLC assist. Pt notes R sided LBP as limiting with weight bearing function. She notes being very pleased with improved ability regarding L hip.  She is riding stationary bike 2 x per day at home and notes some improvements with endurance. She is  not walking long distances or for exercise at this time secondary to LBP. Pt is working with her PCP and upcoming appt with pain management specialist for her LBP.   Quality of life: excellent    Patient Goals  Patient goals for therapy: decreased pain, increased strength, increased motion and independence with ADLs/IADLs  Patient goal: to amb witho ut A device, improve on conditioning and fitness level to walk and standing  longer,  to stand to complete cooking, cleaning, ambulation longer and on stairs improved  Pain  Current pain ratin  At best pain ratin  At worst pain ratin  Location: pulling pain in incision L  without radiation  Quality: pressure and pulling  Relieving factors: ice, rest, change in position and " medications  Aggravating factors: standing and walking  Progression: no change    Social Support  Steps to enter house: yes  1  Stairs in house: yes   Lives in: multiple-level home  Lives with: spouse    Employment status: not working (retired)  Hand dominance: right    Treatments  Previous treatment: physical therapy, medication and injection treatment  Discharged from (in last 30 days) comments: discahrged post op day 2 from hospital.         Objective     Observations     Additional Observation Details  8 cm incision with scar tissue hypomobility present about scar    Palpation   Left   Tenderness of the iliopsoas and lumbar paraspinals.     Right   Tenderness of the lumbar paraspinals.     Lumbar Screen  Lumbar range of motion within normal limits with the following exceptions:Decrease in lumbar extension mobility  severe, chronic    Neurological Testing     Sensation     Hip   Left Hip   Intact: light touch    Right Hip   Intact: light touch    Additional Neurological Details  Intermittent tremor RLE noted, pt reports she has spoken with her pcp regarding this     Active Range of Motion   Left Hip   Flexion: 100 degrees   Extension: 5 degrees   Abduction: 30 degrees     Right Hip   Flexion: 100 degrees   Extension: 5 degrees   Abduction: 30 degrees     Strength/Myotome Testing     Left Hip   Planes of Motion   Flexion: 4  Extension: 4-  Abduction: 4  Adduction: 4+    Right Hip   Planes of Motion   Extension: 4-  Abduction: 4-    Left Knee   Flexion: 4+  Extension: 4+    Right Knee   Flexion: 3-  Extension: 3-  Quadriceps contraction: fair    Left Ankle/Foot   Dorsiflexion: 4+  Plantar flexion: 4+  Inversion: 4+  Eversion: 4+  Great toe flexion: 4+  Great toe extension: 4+    Right Ankle/Foot   Dorsiflexion: 4+  Plantar flexion: 4+  Eversion: 4+  Great toe flexion: 4+  Great toe extension: 4+    Additional Strength Details  R knee ext 4/5, flex 4/5  L knee ext 4/5, flex 4/5    Ambulation   Weight-Bearing Status    Assistive device used: single point cane and two-wheeled walker    Additional Weight-Bearing Status Details  Using SLC for household and short community distances, in LUE.  NBOS demonstrated with dec in R ankle df, pf through gait cycle.  Pt demonstrates safety with amb in tx  60 ft x 2 but does note some fatigue of Les at that distance with pre 3-4/10.   Pt reports uses walker at home for longer distances.   Update 4/9/2025- Pt is amb with SLC in LUE to assist with gait mechanics and pain regarding RLE/R LB. Left hip with negative trendelenberg. Pt trunk in flexion and rigid with no arm swing or trunk rotation during amb. Dec stance RLE. Pt able to amb short distances with AD use but gait deteriorates secondary to pain R LB.             Ambulation: Stairs   Ascend stairs: independent  Pattern: reciprocal  Railings: one rail  Descend stairs: independent  Railings: one rail    Observational Gait   Decreased walking speed, stride length, left step length and right step length.   Left foot contact pattern: heel to toe  Right foot contact pattern: foot flat  Left arm swing: decreased  Right arm swing: decreased  Base of support: decreased    Additional Observational Gait Details  Pt has slight trunk flexed posture with amb. Scoliosis through thoracolumbar spine observed.    General Comments:      Hip Comments   6 minute walk test 3.5 laps hallway fatigue, no pain         Precautions: DOS:  L THR ANT approach 2/20/25,  DOS 2/15/24 (R hip trochanteric bursectomy with gluteus medius repair and IT band transfer), Lumbar surgery, R THR, HTN      Daily Treatment Diary:     Initial Evaluation Date: 03/11/25  Compliance 4/9 3/13 3/19 3/21 3/24 3/28 3/31 4/2 4/4 4/7   Visit Number 11 2 3 4 5 6 7 8 9 10   Re-Eval  REEVAL                 Foto Captured Y                       4/9 2 3/19 3/21 3/24 3/28 3/31 4/2 4/4 4/7   Manual             L Hip PROM- ant hip prec       10m 10m 10 min  AFB 10 m pc                STM L  scar, hip /  "lumbar soft tissue pc nv nv Completed pc with educ for HEP also pc Pc 15m Pc 10m 10m 10 min  AFB 8m pc   Ther-Ex                    nustep  L4 8m L3 5m, LBE 5 m L1 L3 5m, /LBE 5m L1 CD PT L3 5m/UBE 7m L1 L3 nustep 6m L2 L3 nustep  5m, LBE5m L1 L3 nustep 5m, LBEwarm up 5m L3 nustep 5m, LBEwarm up 5m HP warm up today 10 m l/s  spine   T abd, flex, leg curl - 15 ea 10x ea -  2 x 10 0# each LE -- -     BLE to U LE bridge with TA set   2 x 5 2\" hand A 2 x 10, 3\" hand A 2 x 10, no hand A nc Post chain isos 2 x 10, 5\" Post chain isos  2 x 10,5\", mini bridge 5 x 4, 2\" Mini bridge  2 x 10, 3\" hand A Min bridge 2 x 10, 3\" , hand A   LAQ/SAQ - Laq2# 2 x 10, 2\" Clam shell 3 x 10  p BKFOs 10x10 Clam shell 3 x 10//bkfos with TA 2 x 10, 5\" Clam shell 2# 3 x 10, bkfos with TA 10x10\" nc Bkfos 10x10\" stretch with ta/saq 3# B Bkfos 10x10\" stretch with ta/saq 3# B Bkfos 10x10\" stretch with ta/saq 3# B  3x10 Bkfos 10x 10\" stretch with ta//saq 4# 3 x 10 B   QSets -   Supine ASLR 3x5 L, 20x R Supine aslr 4 x 5L, 20x R nc -      ASLRS  - St  15 x ea St 10 x ea, march 15x  H/l march 2 x 10, 2\" nc H/l march with gtb 2 x 10, 2\" H/l march with gtb 2 x 10, 2\" H/l march with gtb 2 x 10, 2\" H/l march with gtb 2 x 10, 2\"   Clam shells, hip abd with ext knee in s/l 3# 3 x 10, A with knee ext  3 x 10    SAQ 3\" L 2 x 10, 5\" Saq 3# 3 x 10, 3\" ea nc Saq 3#  3 x 10, 3\"   Clam shells 3# 2 x 10, aa hip abd knee ext 2 x 10 cues m activ   Clam shell tband h/l nv      H/l gtb 2 x 10, 5\" red plyoball reaches and TA H/l gtb 2 x 10, 5\" H/l gtb 2 x 10, 5\" H/l btb 2 x 10, 5\"   Standing fire hydrants mod.  nv   AA  mod A of PT for task 2 x 10 Aa min to mod A of pt 2 x 15 -  Supine a slides 2 x 10 Supine a slides 2 x 10 Supine a slides 2 x 10 Supine abd slides 2 x 15   Reviewed and issued HEP POC , tx plan, goals and aerobic ex, use of AD all times                   Neuro Re-Ed                    Mini squats       --     -       SLB floor  SLB Air Ex 3 x 10 each LE " "no to 1 UE A floor            Step ups -  10x , 4\"   4\" 10 x 2 ea, lat L 2 x 10, R 5 p! CD 6\" 2 x 10 ea 6\"  2 x 10 ea, lat 2 x 10, 6\"  6\"  1 x 10 ea, lat 1 x 10, 6\"  -   Step taps 4\"       Stair amb 5 laps U HR +slc, BHR recip -     Weight shifting  F/B, S to S //bars             Ther-Act              STS 2 x 5 sts muscle activ 10x B UR A f! After nu step   --  -         Amb/gait tr 6 min walk test  Stair amb U HR + SLC 5 x  non recip Stair amb U HR + SLC 1 lap, non recip Stair amb  B HR recip 6 laps 4-6\" -  Stride length in //bars 4 laps p!  Stide length, posture/m activ parallel bars 4 laps forw/lat/back             Amb out of //bars 40 ft. X 2 cues m activ cs pc    Modalities                    CP L hip HP pre tx LB seated 10m                                                                                            "

## 2025-04-11 ENCOUNTER — OFFICE VISIT (OUTPATIENT)
Dept: PHYSICAL THERAPY | Facility: CLINIC | Age: 75
End: 2025-04-11
Payer: MEDICARE

## 2025-04-11 DIAGNOSIS — G89.18 ACUTE POSTOPERATIVE PAIN OF LEFT HIP: ICD-10-CM

## 2025-04-11 DIAGNOSIS — M25.552 ACUTE POSTOPERATIVE PAIN OF LEFT HIP: ICD-10-CM

## 2025-04-11 DIAGNOSIS — Z96.642 HISTORY OF LEFT HIP REPLACEMENT: ICD-10-CM

## 2025-04-11 DIAGNOSIS — M16.12 OSTEOARTHRITIS OF LEFT HIP, UNSPECIFIED OSTEOARTHRITIS TYPE: Primary | ICD-10-CM

## 2025-04-11 DIAGNOSIS — R26.9 GAIT DIFFICULTY: ICD-10-CM

## 2025-04-11 PROCEDURE — 97110 THERAPEUTIC EXERCISES: CPT | Performed by: PHYSICAL THERAPIST

## 2025-04-11 PROCEDURE — 97140 MANUAL THERAPY 1/> REGIONS: CPT | Performed by: PHYSICAL THERAPIST

## 2025-04-11 PROCEDURE — 97112 NEUROMUSCULAR REEDUCATION: CPT | Performed by: PHYSICAL THERAPIST

## 2025-04-11 NOTE — PROGRESS NOTES
Daily Note     Today's date: 2025  Patient name: RACHNA Pulido  : 1950  MRN: [id number]  Referring provider: Nilo Ramos MD  Dx:   Encounter Diagnosis     ICD-10-CM    1. Osteoarthritis of left hip, unspecified osteoarthritis type  M16.12       2. History of left hip replacement  Z96.642       3. Gait difficulty  R26.9       4. Acute postoperative pain of left hip  G89.18     M25.552                      Subjective: Pt notes she is  working with pain management regarding medication and injection for improved pain control of lumbar spine and will be undergoing injection.       Objective: See treatment diary below      Assessment: Tolerated treatment well. Pt L hip abduction strength and endurance cont to improve with Kristie and standing single limb balance, lateral amb with OTB at thighs.   R hip abduction strength, endurance and R LBP limits tolerance for  WB function.  Patient would benefit from continued PT, skilled care to achieve outlined goals of care.       Plan: Continue per plan of care.      Precautions: DOS:  L THR ANT approach 25,  DOS 2/15/24 (R hip trochanteric bursectomy with gluteus medius repair and IT band transfer), Lumbar surgery, R THR, HTN      Daily Treatment Diary:     Initial Evaluation Date: 25  Compliance 4/9 4/11 3/19 3/21 3/24 3/28 3/31 4 4   Visit Number [id number] 12 3 4 5 6 7 8 9 10   Re-Eval  REEVAL                 Foto Captured Y                       4/9 4/11 3/19 3/21 3/24 3/28 3/31 4 4   Manual             L Hip PROM- ant hip prec  10 m pc     10m 10m 10 min  AFB 10 m pc                STM L  scar, hip / lumbar soft tissue pc pc nv Completed pc with educ for HEP also pc Pc 15m Pc 10m 10m 10 min  AFB 8m pc   Ther-Ex                    nustep   L3 5m, LBE 5 m L1 L3 5m, /LBE 5m L1 CD PT L3 5m/UBE 7m L1 L3 nustep 6m L2 L3 nustep  5m, LBE5m L1 L3 nustep 5m, LBEwarm up 5m L3 nustep 5m, LBEwarm up 5m HP warm up today 10 m l/s  spine   T abd, flex, leg  "curl - - 10x ea -  2 x 10 0# each LE -- -     BLE to U LE bridge with TA set  B 2 x 10 no UE A, R only 7 x, L only 10 x min A needed 2 x 5 2\" hand A 2 x 10, 3\" hand A 2 x 10, no hand A nc Post chain isos 2 x 10, 5\" Post chain isos  2 x 10,5\", mini bridge 5 x 4, 2\" Mini bridge  2 x 10, 3\" hand A Min bridge 2 x 10, 3\" , hand A   LAQ/SAQ - Saq  4#R, 5# L  3 x 10, 5\" Clam shell 3 x 10  p BKFOs 10x10 Clam shell 3 x 10//bkfos with TA 2 x 10, 5\" Clam shell 2# 3 x 10, bkfos with TA 10x10\" nc Bkfos 10x10\" stretch with ta/saq 3# B Bkfos 10x10\" stretch with ta/saq 3# B Bkfos 10x10\" stretch with ta/saq 3# B  3x10 Bkfos 10x 10\" stretch with ta//saq 4# 3 x 10 B   QSets -   Supine ASLR 3x5 L, 20x R Supine aslr 4 x 5L, 20x R nc -      ASLRS  - 2 x 10, 2 x 10 up and overs h/l St 10 x ea, march 15x  H/l march 2 x 10, 2\" nc H/l march with gtb 2 x 10, 2\" H/l march with gtb 2 x 10, 2\" H/l march with gtb 2 x 10, 2\" H/l march with gtb 2 x 10, 2\"   Clam shells, hip abd with ext knee in s/l 3# 3 x 10, A with knee ext  3 x 10  3# 3 x 10, A with knee ext 3 x 10  SAQ 3\" L 2 x 10, 5\" Saq 3# 3 x 10, 3\" ea nc Saq 3#  3 x 10, 3\"   Clam shells 3# 2 x 10, aa hip abd knee ext 2 x 10 cues m activ   Clam shell tband h/l nv      H/l gtb 2 x 10, 5\" red plyoball reaches and TA H/l gtb 2 x 10, 5\" H/l gtb 2 x 10, 5\" H/l btb 2 x 10, 5\"   Standing fire hydrants mod.  nv   AA  mod A of PT for task 2 x 10 Aa min to mod A of pt 2 x 15 -  Supine a slides 2 x 10 Supine a slides 2 x 10 Supine a slides 2 x 10 Supine abd slides 2 x 15   Reviewed and issued HEP POC , tx plan, goals and aerobic ex, use of AD all times                   Neuro Re-Ed                    Mini squats       --     -       SLB floor  SLB Air Ex 3 x 10 each LE no to 1 UE A floor 4 x  B10-15 RLE 1 UE A           Step ups - Lateral amb with otb 3 laps 10x , 4\"   4\" 10 x 2 ea, lat L 2 x 10, R 5 p! CD 6\" 2 x 10 ea 6\"  2 x 10 ea, lat 2 x 10, 6\"  6\"  1 x 10 ea, lat 1 x 10, 6\"  -   Step taps 4\"       " "Stair amb 5 laps U HR +slc, BHR recip -     Weight shifting  F/B, S to S //bars             Ther-Act              STS 2 x 5 sts muscle activ -   --  -         Amb/gait tr 6 min walk test - Stair amb U HR + SLC 5 x  non recip Stair amb U HR + SLC 1 lap, non recip Stair amb  B HR recip 6 laps 4-6\" -  Stride length in //bars 4 laps p!  Stide length, posture/m activ parallel bars 4 laps forw/lat/back             Amb out of //bars 40 ft. X 2 cues m activ cs pc    Modalities                    CP L hip HP pre tx LB seated 10m HP pre tx 10 m                                                                             "

## 2025-04-14 ENCOUNTER — OFFICE VISIT (OUTPATIENT)
Dept: PHYSICAL THERAPY | Facility: CLINIC | Age: 75
End: 2025-04-14
Payer: MEDICARE

## 2025-04-14 DIAGNOSIS — G89.18 ACUTE POSTOPERATIVE PAIN OF LEFT HIP: ICD-10-CM

## 2025-04-14 DIAGNOSIS — M16.12 OSTEOARTHRITIS OF LEFT HIP, UNSPECIFIED OSTEOARTHRITIS TYPE: Primary | ICD-10-CM

## 2025-04-14 DIAGNOSIS — R26.9 GAIT DIFFICULTY: ICD-10-CM

## 2025-04-14 DIAGNOSIS — M25.552 ACUTE POSTOPERATIVE PAIN OF LEFT HIP: ICD-10-CM

## 2025-04-14 DIAGNOSIS — Z96.642 HISTORY OF LEFT HIP REPLACEMENT: ICD-10-CM

## 2025-04-14 PROCEDURE — 97110 THERAPEUTIC EXERCISES: CPT | Performed by: PHYSICAL THERAPIST

## 2025-04-14 PROCEDURE — 97140 MANUAL THERAPY 1/> REGIONS: CPT | Performed by: PHYSICAL THERAPIST

## 2025-04-14 NOTE — PROGRESS NOTES
Daily Note     Today's date: 2025  Patient name: RACHNA Pulido  : 1950  MRN: [id number]  Referring provider: Nilo Ramos MD  Dx:   Encounter Diagnosis     ICD-10-CM    1. Osteoarthritis of left hip, unspecified osteoarthritis type  M16.12       2. History of left hip replacement  Z96.642       3. Gait difficulty  R26.9       4. Acute postoperative pain of left hip  G89.18     M25.552           Start Time: 09  Stop Time: 1000  Total time in clinic (min): 15 minutes    Subjective: Pt notes LBP persists and limits standing and walking.       Objective: See treatment diary below. Pt proceeded through tx with inc resistance with SAQs and progression again with bridging strength and neuromuscular control tasks. Pt unable to complete single leg bridge either LE but with min A of opp LE is able to with fair to good pelvis control for 5 reps. Pt will benefit from cont strength and endurance ex for core and hips. Pt was able to amb with no AD 4 laps at mirror with cues for inc stride length and did nto have R sided trendelenberg this date. She noted R LB pain and overall LE fatigue as limiting.       Assessment: Tolerated treatment well. Patient demonstrated fatigue post treatment but is progressing toward her primary goal of amb with out AD. She will cont to benefit from skilled care to achieve goals.       Plan: Continue per plan of care.      Precautions: DOS:  L THR ANT approach 25,  DOS 2/15/24 (R hip trochanteric bursectomy with gluteus medius repair and IT band transfer), Lumbar surgery, R THR, HTN      Daily Treatment Diary:     Initial Evaluation Date: 25  Compliance 4/9 4/11 4/14 3/21 3/24 3/28 3/31 4 4/   Visit Number 11 12 13 4 5 6 7 8 9 10   Re-Eval  REEVAL                 Foto Captured Y                       4/9 4/11 4/14 3/21 3/24 3/28 3/31 4 4 4   Manual             L Hip PROM- ant hip prec  10 m pc Pc 10m    10m 10m 10 min  AFB 10 m pc                STM L   "scar, hip / lumbar soft tissue pc pc nv Completed pc with educ for HEP also pc Pc 15m Pc 10m 10m 10 min  AFB 8m pc   Ther-Ex                    nustep   Bike at home L3 5m, /LBE 5m L1 CD PT L3 5m/UBE 7m L1 L3 nustep 6m L2 L3 nustep  5m, LBE5m L1 L3 nustep 5m, LBEwarm up 5m L3 nustep 5m, LBEwarm up 5m HP warm up today 10 m l/s  spine   T abd, flex, leg curl - - B bridge with green tband abd iso 2 x 10 -  2 x 10 0# each LE -- -     BLE to U LE bridge with TA set  B 2 x 10 no UE A, R only 7 x, L only 10 x min A needed U bridge as cee 2 x 5 reps ea, cues for form 2 x 10, 3\" hand A 2 x 10, no hand A nc Post chain isos 2 x 10, 5\" Post chain isos  2 x 10,5\", mini bridge 5 x 4, 2\" Mini bridge  2 x 10, 3\" hand A Min bridge 2 x 10, 3\" , hand A   LAQ/SAQ - Saq  4#R, 5# L  3 x 10, 5\" Saq 4 RandL 5# 3 x 15, 5\" Clam shell 3 x 10//bkfos with TA 2 x 10, 5\" Clam shell 2# 3 x 10, bkfos with TA 10x10\" nc Bkfos 10x10\" stretch with ta/saq 3# B Bkfos 10x10\" stretch with ta/saq 3# B Bkfos 10x10\" stretch with ta/saq 3# B  3x10 Bkfos 10x 10\" stretch with ta//saq 4# 3 x 10 B   QSets -   Supine ASLR 3x5 L, 20x R Supine aslr 4 x 5L, 20x R nc -      ASLRS  - 2 x 10, 2 x 10 up and overs h/l H/l march with GTB 2 x 15  H/l march 2 x 10, 2\" nc H/l march with gtb 2 x 10, 2\" H/l march with gtb 2 x 10, 2\" H/l march with gtb 2 x 10, 2\" H/l march with gtb 2 x 10, 2\"   Clam shells, hip abd with ext knee in s/l 3# 3 x 10, A with knee ext  3 x 10  3# 3 x 10, A with knee ext 3 x 10 4# 3 x 10, A hip abd with knee ext  3 x 10 SAQ 3\" L 2 x 10, 5\" Saq 3# 3 x 10, 3\" ea nc Saq 3#  3 x 10, 3\"   Clam shells 3# 2 x 10, aa hip abd knee ext 2 x 10 cues m activ   Clam shell tband h/l nv      H/l gtb 2 x 10, 5\" red plyoball reaches and TA H/l gtb 2 x 10, 5\" H/l gtb 2 x 10, 5\" H/l btb 2 x 10, 5\"   Standing fire hydrants mod.  nv  - AA  mod A of PT for task 2 x 10 Aa min to mod A of pt 2 x 15 -  Supine a slides 2 x 10 Supine a slides 2 x 10 Supine a slides 2 x 10 Supine " "abd slides 2 x 15   Reviewed and issued HEP POC , tx plan, goals and aerobic ex, use of AD all times                   Neuro Re-Ed                    Mini squats       --     -       SLB floor  SLB Air Ex 3 x 10 each LE no to 1 UE A floor 4 x  B10-15 RLE 1 UE A nv          Step ups - Lateral amb with otb 3 laps Lat amb with no tband 6 laps   4\" 10 x 2 ea, lat L 2 x 10, R 5 p! CD 6\" 2 x 10 ea 6\"  2 x 10 ea, lat 2 x 10, 6\"  6\"  1 x 10 ea, lat 1 x 10, 6\"  -   Step taps 4\"   Amb without AD 4 laps- cues longer strides    Stair amb 5 laps U HR +slc, BHR recip -     Weight shifting  F/B, S to S //bars             Ther-Act              STS 2 x 5 sts muscle activ -   --  -         Amb/gait tr 6 min walk test - - Stair amb U HR + SLC 1 lap, non recip Stair amb  B HR recip 6 laps 4-6\" -  Stride length in //bars 4 laps p!  Stide length, posture/m activ parallel bars 4 laps forw/lat/back             Amb out of //bars 40 ft. X 2 cues m activ cs pc    Modalities                    CP L hip HP pre tx LB seated 10m HP pre tx 10 m HP pre tx 10m                                                                              "

## 2025-04-16 ENCOUNTER — OFFICE VISIT (OUTPATIENT)
Dept: PHYSICAL THERAPY | Facility: CLINIC | Age: 75
End: 2025-04-16
Payer: MEDICARE

## 2025-04-16 DIAGNOSIS — M16.12 OSTEOARTHRITIS OF LEFT HIP, UNSPECIFIED OSTEOARTHRITIS TYPE: Primary | ICD-10-CM

## 2025-04-16 DIAGNOSIS — R26.9 GAIT DIFFICULTY: ICD-10-CM

## 2025-04-16 DIAGNOSIS — M25.552 ACUTE POSTOPERATIVE PAIN OF LEFT HIP: ICD-10-CM

## 2025-04-16 DIAGNOSIS — G89.18 ACUTE POSTOPERATIVE PAIN OF LEFT HIP: ICD-10-CM

## 2025-04-16 DIAGNOSIS — Z96.642 HISTORY OF LEFT HIP REPLACEMENT: ICD-10-CM

## 2025-04-16 PROCEDURE — 97110 THERAPEUTIC EXERCISES: CPT | Performed by: PHYSICAL THERAPIST

## 2025-04-16 PROCEDURE — 97112 NEUROMUSCULAR REEDUCATION: CPT | Performed by: PHYSICAL THERAPIST

## 2025-04-16 NOTE — PROGRESS NOTES
Daily Note     Today's date: 2025  Patient name: RACHNA Pulido  : 1950  MRN: [id number]  Referring provider: Nilo Ramos MD  Dx:   Encounter Diagnosis     ICD-10-CM    1. Osteoarthritis of left hip, unspecified osteoarthritis type  M16.12       2. History of left hip replacement  Z96.642       3. Gait difficulty  R26.9       4. Acute postoperative pain of left hip  G89.18     M25.552                      Subjective: Pt notes having a lot of back pain yesterday which decreased temporarily with use of CP/HP use.  She notes increasing her walking with community errands with SLC or shopping cart use and states increase in activity did not increase her pain.  She notes mild R LBP today. States L hip was not a problem with outings yesterday.       Objective: See treatment diary below      Assessment: Tolerated treatment well. Patient would benefit from continued PT.Improved stance on RLE , no trendelenberg on L.      Plan: Continue per plan of care.      Precautions: DOS:  L THR ANT approach 25,  DOS 2/15/24 (R hip trochanteric bursectomy with gluteus medius repair and IT band transfer), Lumbar surgery, R THR, HTN      Daily Treatment Diary:     Initial Evaluation Date: 25  Compliance 4/9 4/11 4/14 4/16 3/24 3/28 3/31 4/2 4/4 4/7   Visit Number 11 12 13 14 5 6 7 8 9 10   Re-Eval  REEVAL                 Foto Captured Y                       4/9 4/11 4/14 4/16 3/24 3/28 3/31 4/2 4/4 4/7   Manual             L Hip PROM- ant hip prec  10 m pc Pc 10m    10m 10m 10 min  AFB 10 m pc                STM L  scar, hip / lumbar soft tissue pc pc nv nv pc Pc 15m Pc 10m 10m 10 min  AFB 8m pc   Ther-Ex                    nustep   Bike at home Bike at home L3 5m/UBE 7m L1 L3 nustep 6m L2 L3 nustep  5m, LBE5m L1 L3 nustep 5m, LBEwarm up 5m L3 nustep 5m, LBEwarm up 5m HP warm up today 10 m l/s  spine   T abd, flex, leg curl - - B bridge with green tband abd iso 2 x 10 B bridge with btb 2 x 10, abd iso  2 x  "10 0# each LE -- -     BLE to U LE bridge with TA set  B 2 x 10 no UE A, R only 7 x, L only 10 x min A needed U bridge as cee 2 x 5 reps ea, cues for form U  bridge 10x ea 2 x 10, no hand A nc Post chain isos 2 x 10, 5\" Post chain isos  2 x 10,5\", mini bridge 5 x 4, 2\" Mini bridge  2 x 10, 3\" hand A Min bridge 2 x 10, 3\" , hand A   LAQ/SAQ - Saq  4#R, 5# L  3 x 10, 5\" Saq 4 RandL 5# 3 x 15, 5\" Saq 4 RandL 5# 3 x 15, 5\" Clam shell 2# 3 x 10, bkfos with TA 10x10\" nc Bkfos 10x10\" stretch with ta/saq 3# B Bkfos 10x10\" stretch with ta/saq 3# B Bkfos 10x10\" stretch with ta/saq 3# B  3x10 Bkfos 10x 10\" stretch with ta//saq 4# 3 x 10 B   QSets -   Supinelift overs 3 blzpds 3 x 10 ea Supine aslr 4 x 5L, 20x R nc -      ASLRS  - 2 x 10, 2 x 10 up and overs h/l H/l march with BTB 3x 10 H/l march btb 3 x 10 H/l march 2 x 10, 2\" nc H/l march with gtb 2 x 10, 2\" H/l march with gtb 2 x 10, 2\" H/l march with gtb 2 x 10, 2\" H/l march with gtb 2 x 10, 2\"   Clam shells, hip abd with ext knee in s/l 3# 3 x 10, A with knee ext  3 x 10  3# 3 x 10, A with knee ext 3 x 10 4# 3x 10// hip abd with knee ext  3 x 10 4# 3 x 10, 1# str knee abd 3 x 10 Saq 3# 3 x 10, 3\" ea nc Saq 3#  3 x 10, 3\"   Clam shells 3# 2 x 10, aa hip abd knee ext 2 x 10 cues m activ   Clam shell tband h/l nv      H/l gtb 2 x 10, 5\" red plyoball reaches and TA H/l gtb 2 x 10, 5\" H/l gtb 2 x 10, 5\" H/l btb 2 x 10, 5\"   Standing fire hydrants mod.  nv  - - Aa min to mod A of pt 2 x 15 -  Supine a slides 2 x 10 Supine a slides 2 x 10 Supine a slides 2 x 10 Supine abd slides 2 x 15   Reviewed and issued HEP POC , tx plan, goals and aerobic ex, use of AD all times                   Neuro Re-Ed                    Mini squats       --     -       SLB floor  SLB Air Ex 3 x 10 each LE no to 1 UE A floor 4 x  B10-15 RLE 1 UE A nv Front lunge reaches 15x ea cues posture         Step ups - Lateral amb with otb 3 laps Lat amb with no tband 6 laps Lat amb with no tband 6 laps  4\" 10 x " "2 ea, lat L 2 x 10, R 5 p! CD 6\" 2 x 10 ea 6\"  2 x 10 ea, lat 2 x 10, 6\"  6\"  1 x 10 ea, lat 1 x 10, 6\"  -   Step taps 4\"   Amb without AD 4 laps- cues longer strides Amb without AD 6 laps- cues longer strides   Stair amb 5 laps U HR +slc, BHR recip -     Weight shifting  F/B, S to S //bars    Long strides 4 laps //bars         Ther-Act              STS 2 x 5 sts muscle activ -   --  -         Amb/gait tr 6 min walk test - - Stair amb U HR + SLC 1 lap, non recip Stair amb  B HR recip 6 laps 4-6\" -  Stride length in //bars 4 laps p!  Stide length, posture/m activ parallel bars 4 laps forw/lat/back             Amb out of //bars 40 ft. X 2 cues m activ cs pc    Modalities                    CP L hip HP pre tx LB seated 10m HP pre tx 10 m HP pre tx 10m                                                                                "

## 2025-04-18 ENCOUNTER — OFFICE VISIT (OUTPATIENT)
Dept: PHYSICAL THERAPY | Facility: CLINIC | Age: 75
End: 2025-04-18
Payer: MEDICARE

## 2025-04-18 DIAGNOSIS — R26.9 GAIT DIFFICULTY: ICD-10-CM

## 2025-04-18 DIAGNOSIS — G89.18 ACUTE POSTOPERATIVE PAIN OF LEFT HIP: ICD-10-CM

## 2025-04-18 DIAGNOSIS — M25.552 ACUTE POSTOPERATIVE PAIN OF LEFT HIP: ICD-10-CM

## 2025-04-18 DIAGNOSIS — Z96.642 HISTORY OF LEFT HIP REPLACEMENT: ICD-10-CM

## 2025-04-18 DIAGNOSIS — M16.12 OSTEOARTHRITIS OF LEFT HIP, UNSPECIFIED OSTEOARTHRITIS TYPE: Primary | ICD-10-CM

## 2025-04-18 PROCEDURE — 97112 NEUROMUSCULAR REEDUCATION: CPT

## 2025-04-18 PROCEDURE — 97110 THERAPEUTIC EXERCISES: CPT | Performed by: PHYSICAL THERAPIST

## 2025-04-18 PROCEDURE — 97112 NEUROMUSCULAR REEDUCATION: CPT | Performed by: PHYSICAL THERAPIST

## 2025-04-18 NOTE — PROGRESS NOTES
Daily Note     Today's date: 2025  Patient name: RACHNA Pulido  : 1950  MRN: [id number]  Referring provider: Nilo Ramos MD  Dx:   Encounter Diagnosis     ICD-10-CM    1. Osteoarthritis of left hip, unspecified osteoarthritis type  M16.12       2. Acute postoperative pain of left hip  G89.18     M25.552       3. Gait difficulty  R26.9       4. History of left hip replacement  Z96.642                      Subjective: Pt notes L hip is doing very well.       Objective: See treatment diary below. Pt progressed with strength and WB activity this date. Pt had no pain or difficulty with L hip. Pt noted fatigue in R hip as limiting and did demonstrate + Trendelenberg R hip when fatigue without AD use.       Assessment: Tolerated treatment well. Patient would benefit from continued PT      Plan: Continue per plan of care.      Precautions: DOS:  L THR ANT approach 25,  DOS 2/15/24 (R hip trochanteric bursectomy with gluteus medius repair and IT band transfer), Lumbar surgery, R THR, HTN      Daily Treatment Diary:     Initial Evaluation Date: 25  Compliance 4/9 4/11 4/14 4/16 4/18 3/28 3/31 4/2 4/4 4/7   Visit Number 11 12 13 14 15 6 7 8 9 10   Re-Eval  REEVAL                 Foto Captured Y                       4/9 4/11 4/14 4/16 4/18 3/28 3/31 4/2 4/4 4/7   Manual             L Hip PROM- ant hip prec  10 m pc Pc 10m    10m 10m 10 min  AFB 10 m pc                STM L  scar, hip / lumbar soft tissue pc pc nv nv - Pc 15m Pc 10m 10m 10 min  AFB 8m pc   Ther-Ex                    nustep   Bike at home Bike at home - L3 nustep 6m L2 L3 nustep  5m, LBE5m L1 L3 nustep 5m, LBEwarm up 5m L3 nustep 5m, LBEwarm up 5m HP warm up today 10 m l/s  spine   T abd, flex, leg curl - - B bridge with green tband abd iso 2 x 10 B bridge with btb 2 x 10, abd iso B bridge w/btb 2 x 10, abd iso 2 x 10 0# each LE -- -     BLE to U LE bridge with TA set  B 2 x 10 no UE A, R only 7 x, L only 10 x min A needed U  "bridge as cee 2 x 5 reps ea, cues for form U  bridge 10x ea U bridge nc Post chain isos 2 x 10, 5\" Post chain isos  2 x 10,5\", mini bridge 5 x 4, 2\" Mini bridge  2 x 10, 3\" hand A Min bridge 2 x 10, 3\" , hand A   LAQ/SAQ - Saq  4#R, 5# L  3 x 10, 5\" Saq 4 RandL 5# 3 x 15, 5\" Saq 4 RandL 5# 3 x 15, 5\" Saq r and L 7#, 3 x 15, 5\" nc Bkfos 10x10\" stretch with ta/saq 3# B Bkfos 10x10\" stretch with ta/saq 3# B Bkfos 10x10\" stretch with ta/saq 3# B  3x10 Bkfos 10x 10\" stretch with ta//saq 4# 3 x 10 B   QSets -   Supinelift overs 3 blzpds 3 x 10 ea H/l lift overs 2-3 blz pods 2 x 10 ea nc -      ASLRS  - 2 x 10, 2 x 10 up and overs h/l H/l march with BTB 3x 10 H/l march btb 3 x 10 nc nc H/l march with gtb 2 x 10, 2\" H/l march with gtb 2 x 10, 2\" H/l march with gtb 2 x 10, 2\" H/l march with gtb 2 x 10, 2\"   Clam shells, hip abd with ext knee in s/l 3# 3 x 10, A with knee ext  3 x 10  3# 3 x 10, A with knee ext 3 x 10 4# 3x 10// hip abd with knee ext  3 x 10 4# 3 x 10, 1# str knee abd 3 x 10 4# 4 x 10, 4# fire hydr 4 x 10 1# slr knee abd 4 x 10 nc Saq 3#  3 x 10, 3\"   Clam shells 3# 2 x 10, aa hip abd knee ext 2 x 10 cues m activ   Clam shell tband h/l nv      H/l gtb 2 x 10, 5\" red plyoball reaches and TA H/l gtb 2 x 10, 5\" H/l gtb 2 x 10, 5\" H/l btb 2 x 10, 5\"   Standing fire hydrants mod.  nv  - -  2 x 10 B -  Supine a slides 2 x 10 Supine a slides 2 x 10 Supine a slides 2 x 10 Supine abd slides 2 x 15   Reviewed and issued HEP POC , tx plan, goals and aerobic ex, use of AD all times                   Neuro Re-Ed                    Mini squats       --    -       SLB floor  SLB Air Ex 3 x 10 each LE no to 1 UE A floor 4 x  B10-15 RLE 1 UE A nv Front lunge reaches 15x ea cues posture Front lung reaches 15 x ea cues posture        Step ups - Lateral amb with otb 3 laps Lat amb with no tband 6 laps Lat amb with no tband 6 laps Lat amb GTB 4 laps 4\" 10 x 2 ea, lat L 2 x 10, R 5 p! CD 6\" 2 x 10 ea 6\"  2 x 10 ea, lat 2 x 10, 6\"  " "6\"  1 x 10 ea, lat 1 x 10, 6\"  -   Step taps 4\"   Amb without AD 4 laps- cues longer strides Amb without AD 6 laps- cues longer strides Amb with out AD 8 laps cues posture, stride length  Stair amb 5 laps U HR +slc, BHR recip -     Weight shifting  F/B, S to S //bars    Long strides 4 laps //bars         Ther-Act              STS 2 x 5 sts muscle activ -   --  -         Amb/gait tr 6 min walk test - - Stair amb U HR + SLC 1 lap, non recip  -  Stride length in //bars 4 laps p!  Stide length, posture/m activ parallel bars 4 laps forw/lat/back             Amb out of //bars 40 ft. X 2 cues m activ cs pc    Modalities                    CP L hip HP pre tx LB seated 10m HP pre tx 10 m HP pre tx 10m                                                                                  "

## 2025-04-21 ENCOUNTER — APPOINTMENT (OUTPATIENT)
Dept: PHYSICAL THERAPY | Facility: CLINIC | Age: 75
End: 2025-04-21
Payer: MEDICARE

## 2025-04-23 ENCOUNTER — OFFICE VISIT (OUTPATIENT)
Dept: PHYSICAL THERAPY | Facility: CLINIC | Age: 75
End: 2025-04-23
Payer: MEDICARE

## 2025-04-23 DIAGNOSIS — Z96.642 HISTORY OF LEFT HIP REPLACEMENT: ICD-10-CM

## 2025-04-23 DIAGNOSIS — M16.12 OSTEOARTHRITIS OF LEFT HIP, UNSPECIFIED OSTEOARTHRITIS TYPE: Primary | ICD-10-CM

## 2025-04-23 DIAGNOSIS — G89.18 ACUTE POSTOPERATIVE PAIN OF LEFT HIP: ICD-10-CM

## 2025-04-23 DIAGNOSIS — R26.9 GAIT DIFFICULTY: ICD-10-CM

## 2025-04-23 DIAGNOSIS — M25.552 ACUTE POSTOPERATIVE PAIN OF LEFT HIP: ICD-10-CM

## 2025-04-23 PROCEDURE — 97110 THERAPEUTIC EXERCISES: CPT | Performed by: PHYSICAL THERAPIST

## 2025-04-23 PROCEDURE — 97112 NEUROMUSCULAR REEDUCATION: CPT | Performed by: PHYSICAL THERAPIST

## 2025-04-23 NOTE — PROGRESS NOTES
Daily Note     Today's date: 2025  Patient name: RACHNA Pulido  : 1950  MRN: [id number]  Referring provider: Nilo Ramos MD  Dx:   Encounter Diagnosis     ICD-10-CM    1. Osteoarthritis of left hip, unspecified osteoarthritis type  M16.12       2. Acute postoperative pain of left hip  G89.18     M25.552       3. Gait difficulty  R26.9       4. History of left hip replacement  Z96.642                      Subjective: Pt notes progress with abiilty to amb without AD but does find fatigue of R hip  and less so L hip as limiting. She notes mild SOB fatigue with longer distance amb. She notes travel over weekend with good tolerance. Notes back felt better with lying in firmer bed.       Objective: See treatment diary below. Pt amb length of gym 2x with out AD with god gait mechanics demonstrated and no trendelemberg either LE- trunk flexed and no R UE arm swing demonstrated. Pt gait declined for 3rd lap with fatigue of R hip abduction  and difficulty advancing and clearing L foot for swing during stance on RLE and required to use SLC in LUE to assist with gait mechanics. Pt noted       Assessment: Tolerated treatment well. Pt demonstrating progress toward outlined goals of care. Patient demonstrated fatigue post treatment and would benefit from continued PT.        Plan: Continue per plan of care.      Precautions: DOS:  L THR ANT approach 25,  DOS 2/15/24 (R hip trochanteric bursectomy with gluteus medius repair and IT band transfer), Lumbar surgery, R THR, HTN      Daily Treatment Diary:     Initial Evaluation Date: 25  Compliance 4/9 4/11 4/14 4/16 4/18 4/23 3/31 4/2 4/4 4/7   Visit Number 11 12 13 14 15 16 7 8 9 10   Re-Eval  REEVAL                 Foto Captured Y                       4/9 4/11 4/14 4/16 4/18 4/23 3/31 4/2 4/4 4/7   Manual             L Hip PROM- ant hip prec  10 m pc Pc 10m    10m 10m 10 min  AFB 10 m pc                STM L  scar, hip / lumbar soft tissue pc pc nv nv  "-  Pc 10m 10m 10 min  AFB 8m pc   Ther-Ex                    nustep   Bike at home Bike at home - - L3 nustep  5m, LBE5m L1 L3 nustep 5m, LBEwarm up 5m L3 nustep 5m, LBEwarm up 5m HP warm up today 10 m l/s  spine   T abd, flex, leg curl - - B bridge with green tband abd iso 2 x 10 B bridge with btb 2 x 10, abd iso B bridge w/btb 2 x 10, abd iso B bridge w/btb + yplyoball at pelvis2 x 10, abd iso -- -     BLE to U LE bridge with TA set  B 2 x 10 no UE A, R only 7 x, L only 10 x min A needed U bridge as cee 2 x 5 reps ea, cues for form U  bridge 10x ea U bridge U bridge 3 x 10 ea Post chain isos 2 x 10, 5\" Post chain isos  2 x 10,5\", mini bridge 5 x 4, 2\" Mini bridge  2 x 10, 3\" hand A Min bridge 2 x 10, 3\" , hand A   LAQ/SAQ - Saq  4#R, 5# L  3 x 10, 5\" Saq 4 RandL 5# 3 x 15, 5\" Saq 4 RandL 5# 3 x 15, 5\" Saq r and L 7#, 3 x 15, 5\" Saq R  and L 7# 3 x 10 Bkfos 10x10\" stretch with ta/saq 3# B Bkfos 10x10\" stretch with ta/saq 3# B Bkfos 10x10\" stretch with ta/saq 3# B  3x10 Bkfos 10x 10\" stretch with ta//saq 4# 3 x 10 B   QSets -   Supinelift overs 3 blzpds 3 x 10 ea H/l lift overs 2-3 blz pods 2 x 10 ea H/l lift overs 4 levels of blz pods 3 x 10 -      ASLRS  - 2 x 10, 2 x 10 up and overs h/l H/l march with BTB 3x 10 H/l march btb 3 x 10 nc nc H/l march with gtb 2 x 10, 2\" H/l march with gtb 2 x 10, 2\" H/l march with gtb 2 x 10, 2\" H/l march with gtb 2 x 10, 2\"   Clam shells, hip abd with ext knee in s/l 3# 3 x 10, A with knee ext  3 x 10  3# 3 x 10, A with knee ext 3 x 10 4# 3x 10// hip abd with knee ext  3 x 10 4# 3 x 10, 1# str knee abd 3 x 10 4# 4 x 10, 4# fire hydr 4 x 10 1# slr knee abd 4 x 10 5# 3 x 10, 2# at thigh SLR 3 x 10 only Saq 3#  3 x 10, 3\"   Clam shells 3# 2 x 10, aa hip abd knee ext 2 x 10 cues m activ   Clam shell tband h/l nv      H/l gtb 2 x 10, 5\" red plyoball reaches and TA H/l gtb 2 x 10, 5\" H/l gtb 2 x 10, 5\" H/l btb 2 x 10, 5\"   Standing fire hydrants mod.  nv  - -  2 x 10 B 2x10 B  Supine a " "slides 2 x 10 Supine a slides 2 x 10 Supine a slides 2 x 10 Supine abd slides 2 x 15   Reviewed and issued HEP POC , tx plan, goals and aerobic ex, use of AD all times                   Neuro Re-Ed                    Mini squats       --    -       SLB floor  SLB Air Ex 3 x 10 each LE no to 1 UE A floor 4 x  B10-15 RLE 1 UE A nv Front lunge reaches 15x ea cues posture Front lung reaches 15 x ea cues posture        Step ups - Lateral amb with otb 3 laps Lat amb with no tband 6 laps Lat amb with no tband 6 laps Lat amb GTB 4 laps Lat amb no tb 4laps 6\" 2 x 10 ea 6\"  2 x 10 ea, lat 2 x 10, 6\"  6\"  1 x 10 ea, lat 1 x 10, 6\"  -   Step taps 4\"   Amb without AD 4 laps- cues longer strides Amb without AD 6 laps- cues longer strides Amb with out AD 8 laps cues posture, stride length Amb without ad wait to door 4 laps 1.5 w/slc w/gati decline R trendelen Stair amb 5 laps U HR +slc, BHR recip -     Weight shifting  F/B, S to S //bars    Long strides 4 laps //bars         Ther-Act              STS 2 x 5 sts muscle activ -   --  -         Amb/gait tr 6 min walk test - - Stair amb U HR + SLC 1 lap, non recip  -  Stride length in //bars 4 laps p!  Stide length, posture/m activ parallel bars 4 laps forw/lat/back             Amb out of //bars 40 ft. X 2 cues m activ cs pc    Modalities                    CP L hip HP pre tx LB seated 10m HP pre tx 10 m HP pre tx 10m                                                                                    "

## 2025-04-25 ENCOUNTER — OFFICE VISIT (OUTPATIENT)
Dept: PHYSICAL THERAPY | Facility: CLINIC | Age: 75
End: 2025-04-25
Payer: MEDICARE

## 2025-04-25 DIAGNOSIS — G89.18 ACUTE POSTOPERATIVE PAIN OF LEFT HIP: ICD-10-CM

## 2025-04-25 DIAGNOSIS — Z96.642 HISTORY OF LEFT HIP REPLACEMENT: ICD-10-CM

## 2025-04-25 DIAGNOSIS — M25.552 ACUTE POSTOPERATIVE PAIN OF LEFT HIP: ICD-10-CM

## 2025-04-25 DIAGNOSIS — M16.12 OSTEOARTHRITIS OF LEFT HIP, UNSPECIFIED OSTEOARTHRITIS TYPE: Primary | ICD-10-CM

## 2025-04-25 DIAGNOSIS — R26.9 GAIT DIFFICULTY: ICD-10-CM

## 2025-04-25 PROCEDURE — 97112 NEUROMUSCULAR REEDUCATION: CPT | Performed by: PHYSICAL THERAPIST

## 2025-04-25 NOTE — PROGRESS NOTES
Daily Note     Today's date: 2025  Patient name: RACHNA Pulido  : 1950  MRN: [id number]  Referring provider: Nilo Ramos MD  Dx:   Encounter Diagnosis     ICD-10-CM    1. Osteoarthritis of left hip, unspecified osteoarthritis type  M16.12       2. Acute postoperative pain of left hip  G89.18     M25.552       3. Gait difficulty  R26.9       4. History of left hip replacement  Z96.642                      Subjective: Pt  notes her back is painful today mid back to lower back. Noted very good tolerance to last tx session and finds strength and gait improving but challenging when back is painful.       Objective: See treatment diary below. Pt noted no aggravation of LBP except for at end of session noted fatigue and mild inc in pain sx as limiting her. No pain with strength ex L hip this date.       Assessment: Tolerated treatment well. Patient demonstrated fatigue post treatment and would benefit from continued PT. Fatigue of R hip abduction greater than left and LBP cont to limit tolerances in WB.       Plan: Continue per plan of care.      Precautions: DOS:  L THR ANT approach 25,  DOS 2/15/24 (R hip trochanteric bursectomy with gluteus medius repair and IT band transfer), Lumbar surgery, R THR, HTN      Daily Treatment Diary:     Initial Evaluation Date: 25  Compliance    Visit Number 11 12 13 14 15 16 17 8 9 10   Re-Eval  REEVAL                 Foto Captured Y                          Manual             L Hip PROM- ant hip prec  10 m pc Pc 10m     10m 10 min  AFB 10 m pc                STM L  scar, hip / lumbar soft tissue pc pc nv nv -   10m 10 min  AFB 8m pc   Ther-Ex                    nustep   Bike at home Bike at home - -  L3 nustep 5m, LBEwarm up 5m L3 nustep 5m, LBEwarm up 5m HP warm up today 10 m l/s  spine   T abd, flex, leg curl - - B bridge with green tband abd iso 2 x 10 B bridge  "with btb 2 x 10, abd iso B bridge w/btb 2 x 10, abd iso B bridge w/btb + yplyoball at pelvis2 x 10, abd iso B bridge w/berrytb + yplyoball at pelvis2 x 10, abd iso -     BLE to U LE bridge with TA set  B 2 x 10 no UE A, R only 7 x, L only 10 x min A needed U bridge as cee 2 x 5 reps ea, cues for form U  bridge 10x ea U bridge U bridge 3 x 10 ea U bridge 3x10 Post chain isos  2 x 10,5\", mini bridge 5 x 4, 2\" Mini bridge  2 x 10, 3\" hand A Min bridge 2 x 10, 3\" , hand A   LAQ/SAQ - Saq  4#R, 5# L  3 x 10, 5\" Saq 4 RandL 5# 3 x 15, 5\" Saq 4 RandL 5# 3 x 15, 5\" Saq r and L 7#, 3 x 15, 5\" Saq R  and L 7# 3 x 10 Saq R and L  8# 3 x 10 Bkfos 10x10\" stretch with ta/saq 3# B Bkfos 10x10\" stretch with ta/saq 3# B  3x10 Bkfos 10x 10\" stretch with ta//saq 4# 3 x 10 B   QSets -   Supinelift overs 3 blzpds 3 x 10 ea H/l lift overs 2-3 blz pods 2 x 10 ea H/l lift overs 4 levels of blz pods 3 x 10 H/l lift overs 4 levels of blz      ASLRS  - 2 x 10, 2 x 10 up and overs h/l H/l march with BTB 3x 10 H/l march btb 3 x 10 nc nc nc H/l march with gtb 2 x 10, 2\" H/l march with gtb 2 x 10, 2\" H/l march with gtb 2 x 10, 2\"   Clam shells, hip abd with ext knee in s/l 3# 3 x 10, A with knee ext  3 x 10  3# 3 x 10, A with knee ext 3 x 10 4# 3x 10// hip abd with knee ext  3 x 10 4# 3 x 10, 1# str knee abd 3 x 10 4# 4 x 10, 4# fire hydr 4 x 10 1# slr knee abd 4 x 10 5# 3 x 10, 2# at thigh SLR 3 x 10 only 5# 3 x 10, and fire hydrants2# at thigh SLR 3 x 10 only   Clam shells 3# 2 x 10, aa hip abd knee ext 2 x 10 cues m activ   Clam shell tband h/l nv       H/l gtb 2 x 10, 5\" H/l gtb 2 x 10, 5\" H/l btb 2 x 10, 5\"   Standing fire hydrants mod.  nv  - -  2 x 10 B 2x10 B S/l Supine a slides 2 x 10 Supine a slides 2 x 10 Supine abd slides 2 x 15   Reviewed and issued HEP POC , tx plan, goals and aerobic ex, use of AD all times                   Neuro Re-Ed                    Mini squats       --    -       SLB floor  SLB Air Ex 3 x 10 each LE no to 1 " "UE A floor 4 x  B10-15 RLE 1 UE A nv Front lunge reaches 15x ea cues posture Front lung reaches 15 x ea cues posture  -      Step ups - Lateral amb with otb 3 laps Lat amb with no tband 6 laps Lat amb with no tband 6 laps Lat amb GTB 4 laps Lat amb no tb 4laps Lat amb 6 laps no tb 6\"  2 x 10 ea, lat 2 x 10, 6\"  6\"  1 x 10 ea, lat 1 x 10, 6\"  -   Step taps 4\"   Amb without AD 4 laps- cues longer strides Amb without AD 6 laps- cues longer strides Amb with out AD 8 laps cues posture, stride length Amb without ad wait to door 4 laps 1.5 w/slc w/gati decline R trendelen Amb w/o ad 6 laps fatigue -     Weight shifting  F/B, S to S //bars    Long strides 4 laps //bars         Ther-Act              STS 2 x 5 sts muscle activ -   --  -         Amb/gait tr 6 min walk test - - Stair amb U HR + SLC 1 lap, non recip  -  Stride length in //bars 4 laps p!  Stide length, posture/m activ parallel bars 4 laps forw/lat/back             Amb out of //bars 40 ft. X 2 cues m activ cs pc    Modalities                    CP L hip HP pre tx LB seated 10m HP pre tx 10 m HP pre tx 10m                                                                                      "

## 2025-04-28 ENCOUNTER — APPOINTMENT (OUTPATIENT)
Dept: PHYSICAL THERAPY | Facility: CLINIC | Age: 75
End: 2025-04-28
Payer: MEDICARE

## 2025-04-30 ENCOUNTER — OFFICE VISIT (OUTPATIENT)
Dept: PHYSICAL THERAPY | Facility: CLINIC | Age: 75
End: 2025-04-30
Payer: MEDICARE

## 2025-04-30 DIAGNOSIS — Z96.642 HISTORY OF LEFT HIP REPLACEMENT: ICD-10-CM

## 2025-04-30 DIAGNOSIS — M16.12 OSTEOARTHRITIS OF LEFT HIP, UNSPECIFIED OSTEOARTHRITIS TYPE: Primary | ICD-10-CM

## 2025-04-30 DIAGNOSIS — M25.552 ACUTE POSTOPERATIVE PAIN OF LEFT HIP: ICD-10-CM

## 2025-04-30 DIAGNOSIS — G89.18 ACUTE POSTOPERATIVE PAIN OF LEFT HIP: ICD-10-CM

## 2025-04-30 DIAGNOSIS — R26.9 GAIT DIFFICULTY: ICD-10-CM

## 2025-04-30 PROCEDURE — 97112 NEUROMUSCULAR REEDUCATION: CPT | Performed by: PHYSICAL THERAPIST

## 2025-04-30 NOTE — PROGRESS NOTES
Daily Note     Today's date: 2025  Patient name: RACHNA Pulido  : 1950  MRN: [id number]  Referring provider: Nilo Ramos MD  Dx:   Encounter Diagnosis     ICD-10-CM    1. Osteoarthritis of left hip, unspecified osteoarthritis type  M16.12       2. Acute postoperative pain of left hip  G89.18     M25.552       3. Gait difficulty  R26.9       4. History of left hip replacement  Z96.642                      Subjective: Pt notes she is doing ok, a lot of walking  earlier this week.  Notes some intermittent pain L proximal pelvis not with any specific task or function, at times at rest , at times with amb. Increased up to 2/10 at worst, no rash or swelling.       Objective: See treatment diary below. Pt progressed with SLB on Air Ex and with step ups onto Air Ex this date- She did require UE A and cueing for muscle activation with tasks. Pt progressed to high reena amb step overs with BUE A required. Pt able to am 1.5 laps in tx prior to gait decline of R hip trendelenberg and inability to clear L foot.  Able to improve with brief rest standing and able to complete 1/2 lap with out cane, noting overall fatigue. Pt noted no pain in L hip with tx.       Assessment: Tolerated treatment well. Patient demonstrated fatigue post treatment.     Plan: Continue per plan of care.      Precautions: DOS:  L THR ANT approach 25,  DOS 2/15/24 (R hip trochanteric bursectomy with gluteus medius repair and IT band transfer), Lumbar surgery, R THR, HTN      Daily Treatment Diary:     Initial Evaluation Date: 25  Compliance    Visit Number 11 12 13 14 15 16 17 18 9 10   Re-Eval  REEVAL                 Foto Captured Y                          Manual             L Hip PROM- ant hip prec  10 m pc Pc 10m      10 min  AFB 10 m pc                STM L  scar, hip / lumbar soft tissue pc pc nv nv -    10 min  AFB 8m pc  "  Ther-Ex                    nustep   Bike at home Bike at home - -  St hip 4 ways 2 x 15, TR/HR 30x L3 nustep 5m, LBEwarm up 5m HP warm up today 10 m l/s  spine   T abd, flex, leg curl - - B bridge with green tband abd iso 2 x 10 B bridge with btb 2 x 10, abd iso B bridge w/btb 2 x 10, abd iso B bridge w/btb + yplyoball at pelvis2 x 10, abd iso B bridge w/berrytb + yplyoball at pelvis2 x 10, abd iso -     BLE to U LE bridge with TA set  B 2 x 10 no UE A, R only 7 x, L only 10 x min A needed U bridge as cee 2 x 5 reps ea, cues for form U  bridge 10x ea U bridge U bridge 3 x 10 ea U bridge 3x10  Mini bridge  2 x 10, 3\" hand A Min bridge 2 x 10, 3\" , hand A   LAQ/SAQ - Saq  4#R, 5# L  3 x 10, 5\" Saq 4 RandL 5# 3 x 15, 5\" Saq 4 RandL 5# 3 x 15, 5\" Saq r and L 7#, 3 x 15, 5\" Saq R  and L 7# 3 x 10 Saq R and L  8# 3 x 10  Bkfos 10x10\" stretch with ta/saq 3# B  3x10 Bkfos 10x 10\" stretch with ta//saq 4# 3 x 10 B   QSets -   Supinelift overs 3 blzpds 3 x 10 ea H/l lift overs 2-3 blz pods 2 x 10 ea H/l lift overs 4 levels of blz pods 3 x 10 H/l lift overs 4 levels of blz      ASLRS  - 2 x 10, 2 x 10 up and overs h/l H/l march with BTB 3x 10 H/l march btb 3 x 10 nc nc nc  H/l march with gtb 2 x 10, 2\" H/l march with gtb 2 x 10, 2\"   Clam shells, hip abd with ext knee in s/l 3# 3 x 10, A with knee ext  3 x 10  3# 3 x 10, A with knee ext 3 x 10 4# 3x 10// hip abd with knee ext  3 x 10 4# 3 x 10, 1# str knee abd 3 x 10 4# 4 x 10, 4# fire hydr 4 x 10 1# slr knee abd 4 x 10 5# 3 x 10, 2# at thigh SLR 3 x 10 only 5# 3 x 10, and fire hydrants2# at thigh SLR 3 x 10 only   Clam shells 3# 2 x 10, aa hip abd knee ext 2 x 10 cues m activ   Clam shell tband h/l nv        H/l gtb 2 x 10, 5\" H/l btb 2 x 10, 5\"   Standing fire hydrants mod.  nv  - -  2 x 10 B 2x10 B S/l 2x10 Supine a slides 2 x 10 Supine abd slides 2 x 15   Reviewed and issued HEP POC , tx plan, goals and aerobic ex, use of AD all times                   Neuro Re-Ed         " "           Mini squats       --    -       SLB floor  SLB Air Ex 3 x 10 each LE no to 1 UE A floor 4 x  B10-15 RLE 1 UE A nv Front lunge reaches 15x ea cues posture Front lung reaches 15 x ea cues posture  - Floor and Air Ex  3 x 10-20\"//Air ex step up plus LLE 2 x 10     Step ups - Lateral amb with otb 3 laps Lat amb with no tband 6 laps Lat amb with no tband 6 laps Lat amb GTB 4 laps Lat amb no tb 4laps Lat amb 6 laps no tb Lat amb gtb 6 laps 6\"  1 x 10 ea, lat 1 x 10, 6\"  -   Step taps 4\"   Amb without AD 4 laps- cues longer strides Amb without AD 6 laps- cues longer strides Amb with out AD 8 laps cues posture, stride length Amb without ad wait to door 4 laps 1.5 w/slc w/gati decline R trendelen Amb w/o ad 6 laps fatigue Sofia amb -high 4 laps of 3     Weight shifting  F/B, S to S //bars    Long strides 4 laps //bars         Ther-Act              STS 2 x 5 sts muscle activ -   --  -         Amb/gait tr 6 min walk test - - Stair amb U HR + SLC 1 lap, non recip  -  1.5 laps fatigue! Cues long strides , m activ  Stide length, posture/m activ parallel bars 4 laps forw/lat/back             Amb out of //bars 40 ft. X 2 cues m activ cs pc    Modalities                    CP L hip HP pre tx LB seated 10m HP pre tx 10 m HP pre tx 10m                                                                                        "

## 2025-05-02 ENCOUNTER — APPOINTMENT (OUTPATIENT)
Dept: PHYSICAL THERAPY | Facility: CLINIC | Age: 75
End: 2025-05-02
Attending: ORTHOPAEDIC SURGERY
Payer: MEDICARE

## 2025-05-02 ENCOUNTER — APPOINTMENT (OUTPATIENT)
Dept: PHYSICAL THERAPY | Facility: CLINIC | Age: 75
End: 2025-05-02
Payer: MEDICARE

## 2025-05-05 ENCOUNTER — OFFICE VISIT (OUTPATIENT)
Dept: PHYSICAL THERAPY | Facility: CLINIC | Age: 75
End: 2025-05-05
Attending: ORTHOPAEDIC SURGERY
Payer: MEDICARE

## 2025-05-05 DIAGNOSIS — M25.552 ACUTE POSTOPERATIVE PAIN OF LEFT HIP: ICD-10-CM

## 2025-05-05 DIAGNOSIS — R26.9 GAIT DIFFICULTY: ICD-10-CM

## 2025-05-05 DIAGNOSIS — Z96.642 HISTORY OF LEFT HIP REPLACEMENT: ICD-10-CM

## 2025-05-05 DIAGNOSIS — M16.12 OSTEOARTHRITIS OF LEFT HIP, UNSPECIFIED OSTEOARTHRITIS TYPE: Primary | ICD-10-CM

## 2025-05-05 DIAGNOSIS — G89.18 ACUTE POSTOPERATIVE PAIN OF LEFT HIP: ICD-10-CM

## 2025-05-05 PROCEDURE — 97140 MANUAL THERAPY 1/> REGIONS: CPT | Performed by: PHYSICAL THERAPIST

## 2025-05-05 PROCEDURE — 97110 THERAPEUTIC EXERCISES: CPT | Performed by: PHYSICAL THERAPIST

## 2025-05-05 NOTE — PROGRESS NOTES
Daily Note     Today's date: 2025  Patient name: RACHNA Pulido  : 1950  MRN: [id number]  Referring provider: Nilo Ramos MD  Dx:   Encounter Diagnosis     ICD-10-CM    1. Osteoarthritis of left hip, unspecified osteoarthritis type  M16.12       2. Acute postoperative pain of left hip  G89.18     M25.552       3. Gait difficulty  R26.9       4. History of left hip replacement  Z96.642                      Subjective: Pt notes she tolerated travel well over the weekend. She notes no hip pain today.  Pt notes she is walking wth out her cane 50-60% of the time.  She notes R sided LBP and fatigue limits amb  without cane.       Objective: See treatment diary below. She completed strength of L hip and core with out pain sx. L hip abduction completed with 5# proximal to knee with no pain sx this date.  Pt able to amb without SLC A 70 ft. Prior to gait decline requiring a brief pause and then able to complete  40 ft. Prior to gait decline requiring use of SLC secondary to R LB Fatigue reported.       Assessment: Tolerated treatment well. Patient would benefit from continued PT to achieve goals outlined.       Plan: Continue per plan of care.      Precautions: DOS:  L THR ANT approach 25,  DOS 2/15/24 (R hip trochanteric bursectomy with gluteus medius repair and IT band transfer), Lumbar surgery, R THR, HTN      Daily Treatment Diary:     Initial Evaluation Date: 25  Compliance    Visit Number 11 12 13 14 15 16 17 18 19 10   Re-Eval  REEVAL                 Foto Captured Y                          Manual             L Hip PROM- ant hip prec  10 m pc Pc 10m      10 min  pc 10 m pc                STM L  scar, hip / lumbar soft tissue pc pc nv nv -    - 8m pc   Ther-Ex                    nustep   Bike at home Bike at home - -  St hip 4 ways 2 x 15, TR/HR 30x nc HP warm up today 10 m l/s  spine   T abd, flex,  "leg curl - - B bridge with green tband abd iso 2 x 10 B bridge with btb 2 x 10, abd iso B bridge w/btb 2 x 10, abd iso B bridge w/btb + yplyoball at pelvis2 x 10, abd iso B bridge w/berrytb + yplyoball at pelvis2 x 10, abd iso - B bridge with berrytb 3 x 10,    BLE to U LE bridge with TA set  B 2 x 10 no UE A, R only 7 x, L only 10 x min A needed U bridge as cee 2 x 5 reps ea, cues for form U  bridge 10x ea U bridge U bridge 3 x 10 ea U bridge 3x10  U bridge yellow plyoball at abdomen 3 x 10 Min bridge 2 x 10, 3\" , hand A   LAQ/SAQ - Saq  4#R, 5# L  3 x 10, 5\" Saq 4 RandL 5# 3 x 15, 5\" Saq 4 RandL 5# 3 x 15, 5\" Saq r and L 7#, 3 x 15, 5\" Saq R  and L 7# 3 x 10 Saq R and L  8# 3 x 10  Bkfos 10x10\" stretch with ta Bkfos 10x 10\" stretch with ta//saq 4# 3 x 10 B   QSets -   Supinelift overs 3 blzpds 3 x 10 ea H/l lift overs 2-3 blz pods 2 x 10 ea H/l lift overs 4 levels of blz pods 3 x 10 H/l lift overs 4 levels of blz  H/l lift overs 3-4 levels of blz 2 x 10f! L hip flexors    ASLRS  - 2 x 10, 2 x 10 up and overs h/l H/l march with BTB 3x 10 H/l march btb 3 x 10 nc nc nc  nc H/l march with gtb 2 x 10, 2\"   Clam shells, hip abd with ext knee in s/l 3# 3 x 10, A with knee ext  3 x 10  3# 3 x 10, A with knee ext 3 x 10 4# 3x 10// hip abd with knee ext  3 x 10 4# 3 x 10, 1# str knee abd 3 x 10 4# 4 x 10, 4# fire hydr 4 x 10 1# slr knee abd 4 x 10 5# 3 x 10, 2# at thigh SLR 3 x 10 only 5# 3 x 10, and fire hydrants2# at thigh SLR 3 x 10 only  5# clam, fire hyd and hip abd  3 x 10 Clam shells 3# 2 x 10, aa hip abd knee ext 2 x 10 cues m activ   Clam shell tband h/l nv        D/c H/l btb 2 x 10, 5\"   Standing fire hydrants mod.  nv  - -  2 x 10 B 2x10 B S/l 2x10 nc Supine abd slides 2 x 15   Reviewed and issued HEP POC , tx plan, goals and aerobic ex, use of AD all times                   Neuro Re-Ed                    Mini squats       --    -       SLB floor  SLB Air Ex 3 x 10 each LE no to 1 UE A floor 4 x  B10-15 RLE 1 UE " "A nv Front lunge reaches 15x ea cues posture Front lung reaches 15 x ea cues posture  - Floor and Air Ex  3 x 10-20\"//Air ex step up plus LLE 2 x 10 -    Step ups - Lateral amb with otb 3 laps Lat amb with no tband 6 laps Lat amb with no tband 6 laps Lat amb GTB 4 laps Lat amb no tb 4laps Lat amb 6 laps no tb Lat amb gtb 6 laps 6\" R LE 10x, 4\" R LE 15x f! -   Step taps 4\"   Amb without AD 4 laps- cues longer strides Amb without AD 6 laps- cues longer strides Amb with out AD 8 laps cues posture, stride length Amb without ad wait to door 4 laps 1.5 w/slc w/gati decline R trendelen Amb w/o ad 6 laps fatigue Sofia amb -high 4 laps of 3 -    Weight shifting  F/B, S to S //bars    Long strides 4 laps //bars         Ther-Act              STS 2 x 5 sts muscle activ -   --  -         Amb/gait tr 6 min walk test - - Stair amb U HR + SLC 1 lap, non recip  -  1.5 laps fatigue! Cues long strides , m activ 2 laps f! R LB Stide length, posture/m activ parallel bars 4 laps forw/lat/back             Amb out of //bars 40 ft. X 2 cues m activ cs pc    Modalities                    CP L hip HP pre tx LB seated 10m HP pre tx 10 m HP pre tx 10m                                                                                          "

## 2025-05-09 ENCOUNTER — APPOINTMENT (OUTPATIENT)
Dept: PHYSICAL THERAPY | Facility: CLINIC | Age: 75
End: 2025-05-09
Attending: ORTHOPAEDIC SURGERY
Payer: MEDICARE

## 2025-05-09 NOTE — PROGRESS NOTES
Daily Note     Today's date: 2025  Patient name: RACHNA Pulido  : 1950  MRN: [id number]  Referring provider: Nilo Ramos MD  Dx:   Encounter Diagnosis     ICD-10-CM    1. Osteoarthritis of left hip, unspecified osteoarthritis type  M16.12       2. Acute postoperative pain of left hip  G89.18     M25.552       3. Gait difficulty  R26.9       4. History of left hip replacement  Z96.642                      Subjective: The patient states that she had an injection in her back on Saturday, she feels this has been helping with her back pain.        Objective: See treatment diary below      Assessment: Tolerated treatment well.  She was able to increase distance ambulated without AD today, needed one seated rest break 2* fatigue.  No increase in pain noted at end of session.  Patient would benefit from continued PT      Plan: Continue per plan of care.      Precautions: DOS:  L THR ANT approach 25,  DOS 2/15/24 (R hip trochanteric bursectomy with gluteus medius repair and IT band transfer), Lumbar surgery, R THR, HTN      Daily Treatment Diary:     Initial Evaluation Date: 25  Compliance    Visit Number 11 12 13 14 15 16 17 18 19 20   Re-Eval  REEVAL                 Foto Captured Y                          Manual             L Hip PROM- ant hip prec  10 m pc Pc 10m      10 min  pc 10'                STM L  scar, hip / lumbar soft tissue pc pc nv nv -    -    Ther-Ex                    nustep   Bike at home Bike at home - -  St hip 4 ways 2 x 15, TR/HR 30x nc    T abd, flex, leg curl - - B bridge with green tband abd iso 2 x 10 B bridge with btb 2 x 10, abd iso B bridge w/btb 2 x 10, abd iso B bridge w/btb + yplyoball at pelvis2 x 10, abd iso B bridge w/berrytb + yplyoball at pelvis2 x 10, abd iso - B bridge with berrytb 3 x 10, B bridge with berry TB 3x10   BLE to U LE bridge with TA set  B 2 x 10  "no UE A, R only 7 x, L only 10 x min A needed U bridge as cee 2 x 5 reps ea, cues for form U  bridge 10x ea U bridge U bridge 3 x 10 ea U bridge 3x10  U bridge yellow plyoball at abdomen 3 x 10 U bridge yellow plyoball at abdomen 3x10   LAQ/SAQ - Saq  4#R, 5# L  3 x 10, 5\" Saq 4 RandL 5# 3 x 15, 5\" Saq 4 RandL 5# 3 x 15, 5\" Saq r and L 7#, 3 x 15, 5\" Saq R  and L 7# 3 x 10 Saq R and L  8# 3 x 10  Bkfos 10x10\" stretch with ta Bkfos 10x 10\" stretch with ta   QSets -   Supinelift overs 3 blzpds 3 x 10 ea H/l lift overs 2-3 blz pods 2 x 10 ea H/l lift overs 4 levels of blz pods 3 x 10 H/l lift overs 4 levels of blz  H/l lift overs 3-4 levels of blz 2 x 10f! L hip flexors H/L lift overs 3 L   5 R levels of blz 2x10 hip flexors   ASLRS  - 2 x 10, 2 x 10 up and overs h/l H/l march with BTB 3x 10 H/l march btb 3 x 10 nc nc nc  nc    Clam shells, hip abd with ext knee in s/l 3# 3 x 10, A with knee ext  3 x 10  3# 3 x 10, A with knee ext 3 x 10 4# 3x 10// hip abd with knee ext  3 x 10 4# 3 x 10, 1# str knee abd 3 x 10 4# 4 x 10, 4# fire hydr 4 x 10 1# slr knee abd 4 x 10 5# 3 x 10, 2# at thigh SLR 3 x 10 only 5# 3 x 10, and fire hydrants2# at thigh SLR 3 x 10 only  5# clam, fire hyd and hip abd  3 x 10 5# clam, fire hyd and hip abd (0# for abd) 3x10 ea   Clam shell tband h/l nv        D/c    Standing fire hydrants mod.  nv  - -  2 x 10 B 2x10 B S/l 2x10 nc    Reviewed and issued HEP POC , tx plan, goals and aerobic ex, use of AD all times                   Neuro Re-Ed                    Mini squats       --    -       SLB floor  SLB Air Ex 3 x 10 each LE no to 1 UE A floor 4 x  B10-15 RLE 1 UE A nv Front lunge reaches 15x ea cues posture Front lung reaches 15 x ea cues posture  - Floor and Air Ex  3 x 10-20\"//Air ex step up plus LLE 2 x 10 -    Step ups - Lateral amb with otb 3 laps Lat amb with no tband 6 laps Lat amb with no tband 6 laps Lat amb GTB 4 laps Lat amb no tb 4laps Lat amb 6 laps no tb Lat amb gtb 6 laps 6\" R LE " "10x, 4\" R LE 15x f! 6\" RLE & LLE  10x ea   Step taps 4\"   Amb without AD 4 laps- cues longer strides Amb without AD 6 laps- cues longer strides Amb with out AD 8 laps cues posture, stride length Amb without ad wait to door 4 laps 1.5 w/slc w/gati decline R trendelen Amb w/o ad 6 laps fatigue Sofia amb -high 4 laps of 3 -    Weight shifting  F/B, S to S //bars    Long strides 4 laps //bars         Ther-Act              STS 2 x 5 sts muscle activ -   --  -         Amb/gait tr 6 min walk test - - Stair amb U HR + SLC 1 lap, non recip  -  1.5 laps fatigue! Cues long strides , m activ 2 laps f! R LB 3 laps with one seated rest break 2* fatigue                  Modalities                    CP L hip HP pre tx LB seated 10m HP pre tx 10 m HP pre tx 10m                                                                                            "

## 2025-05-12 ENCOUNTER — OFFICE VISIT (OUTPATIENT)
Dept: PHYSICAL THERAPY | Facility: CLINIC | Age: 75
End: 2025-05-12
Attending: ORTHOPAEDIC SURGERY
Payer: MEDICARE

## 2025-05-12 DIAGNOSIS — R26.9 GAIT DIFFICULTY: ICD-10-CM

## 2025-05-12 DIAGNOSIS — Z96.642 HISTORY OF LEFT HIP REPLACEMENT: ICD-10-CM

## 2025-05-12 DIAGNOSIS — G89.18 ACUTE POSTOPERATIVE PAIN OF LEFT HIP: ICD-10-CM

## 2025-05-12 DIAGNOSIS — M16.12 OSTEOARTHRITIS OF LEFT HIP, UNSPECIFIED OSTEOARTHRITIS TYPE: Primary | ICD-10-CM

## 2025-05-12 DIAGNOSIS — M25.552 ACUTE POSTOPERATIVE PAIN OF LEFT HIP: ICD-10-CM

## 2025-05-12 PROCEDURE — 97530 THERAPEUTIC ACTIVITIES: CPT | Performed by: PHYSICAL THERAPIST

## 2025-05-12 PROCEDURE — 97140 MANUAL THERAPY 1/> REGIONS: CPT | Performed by: PHYSICAL THERAPIST

## 2025-05-12 PROCEDURE — 97110 THERAPEUTIC EXERCISES: CPT | Performed by: PHYSICAL THERAPIST

## 2025-05-14 ENCOUNTER — OFFICE VISIT (OUTPATIENT)
Dept: PHYSICAL THERAPY | Facility: CLINIC | Age: 75
End: 2025-05-14
Attending: ORTHOPAEDIC SURGERY
Payer: MEDICARE

## 2025-05-14 DIAGNOSIS — R26.9 GAIT DIFFICULTY: ICD-10-CM

## 2025-05-14 DIAGNOSIS — Z96.642 HISTORY OF LEFT HIP REPLACEMENT: ICD-10-CM

## 2025-05-14 DIAGNOSIS — M16.12 OSTEOARTHRITIS OF LEFT HIP, UNSPECIFIED OSTEOARTHRITIS TYPE: Primary | ICD-10-CM

## 2025-05-14 DIAGNOSIS — G89.18 ACUTE POSTOPERATIVE PAIN OF LEFT HIP: ICD-10-CM

## 2025-05-14 DIAGNOSIS — M25.552 ACUTE POSTOPERATIVE PAIN OF LEFT HIP: ICD-10-CM

## 2025-05-14 PROCEDURE — 97140 MANUAL THERAPY 1/> REGIONS: CPT | Performed by: PHYSICAL THERAPIST

## 2025-05-14 PROCEDURE — 97110 THERAPEUTIC EXERCISES: CPT | Performed by: PHYSICAL THERAPIST

## 2025-05-14 NOTE — PROGRESS NOTES
Daily Note     Today's date: 2025  Patient name: RACHNA Pulido  : 1950  MRN: [id number]  Referring provider: Nilo Ramos MD  Dx:   Encounter Diagnosis     ICD-10-CM    1. Osteoarthritis of left hip, unspecified osteoarthritis type  M16.12       2. Acute postoperative pain of left hip  G89.18     M25.552       3. Gait difficulty  R26.9       4. History of left hip replacement  Z96.642                      Subjective: Pt notes she has not used her cane in 2 days. She had pain management procedure which has helped her back pain.      Objective: See treatment diary below. Progressed with amb with weighted ball milan completing 1 lap x  2 with 2m rest between. Pt fatigued at 3/4 of one lap with minor gait decline and ability to complete with out pain, just note of fatigue. Pt now completing 5# proximal to knee for lateral hip strength LLE in R s/l.      Assessment: Tolerated treatment well. Patient demonstrated fatigue post treatment. Pt progressing well toward outlined goals of care.  She is progressing toward d/c to HEP in next 1 - 2 weeks.       Plan: Potential discharge next visit.     Precautions: DOS:  L THR ANT approach 25,  DOS 2/15/24 (R hip trochanteric bursectomy with gluteus medius repair and IT band transfer), Lumbar surgery, R THR, HTN      Daily Treatment Diary:     Initial Evaluation Date: 25  Compliance    Visit Number [id number] 13 14 15 16 17 18 19 20   Re-Eval   Reeval needed                Foto Captured                           Manual             L Hip PROM- ant hip prec 10m 10 m pc Pc 10m      10 min  pc 10'                STM L  scar, hip / lumbar soft tissue - pc nv nv -    -    Ther-Ex                    nustep   Bike at home Bike at home - -  St hip 4 ways 2 x 15, TR/HR 30x nc    T abd, flex, leg curl B leg bridge berry  with blue plyoball 3 x 10 - B bridge with green tband  "abd iso 2 x 10 B bridge with btb 2 x 10, abd iso B bridge w/btb 2 x 10, abd iso B bridge w/btb + yplyoball at pelvis2 x 10, abd iso B bridge w/berrytb + yplyoball at pelvis2 x 10, abd iso - B bridge with berrytb 3 x 10, B bridge with berry TB 3x10   BLE to U LE bridge with TA set U bridge with blue plyoball 3 x 10 B 2 x 10 no UE A, R only 7 x, L only 10 x min A needed U bridge as cee 2 x 5 reps ea, cues for form U  bridge 10x ea U bridge U bridge 3 x 10 ea U bridge 3x10  U bridge yellow plyoball at abdomen 3 x 10 U bridge yellow plyoball at abdomen 3x10   LAQ/SAQ Bkfos 10x10\" Saq  4#R, 5# L  3 x 10, 5\" Saq 4 RandL 5# 3 x 15, 5\" Saq 4 RandL 5# 3 x 15, 5\" Saq r and L 7#, 3 x 15, 5\" Saq R  and L 7# 3 x 10 Saq R and L  8# 3 x 10  Bkfos 10x10\" stretch with ta Bkfos 10x 10\" stretch with ta   QSets -H/L lift overs 6 B 3 x 10   Supinelift overs 3 blzpds 3 x 10 ea H/l lift overs 2-3 blz pods 2 x 10 ea H/l lift overs 4 levels of blz pods 3 x 10 H/l lift overs 4 levels of blz  H/l lift overs 3-4 levels of blz 2 x 10f! L hip flexors H/L lift overs 3 L   5 R levels of blz 2x10 hip flexors   ASLRS  - 2 x 10, 2 x 10 up and overs h/l H/l march with BTB 3x 10 H/l march btb 3 x 10 nc nc nc  nc    Clam shells, hip abd with ext knee in s/l 5# weight proximal to knee  3 x 10  3# 3 x 10, A with knee ext 3 x 10 4# 3x 10// hip abd with knee ext  3 x 10 4# 3 x 10, 1# str knee abd 3 x 10 4# 4 x 10, 4# fire hydr 4 x 10 1# slr knee abd 4 x 10 5# 3 x 10, 2# at thigh SLR 3 x 10 only 5# 3 x 10, and fire hydrants2# at thigh SLR 3 x 10 only  5# clam, fire hyd and hip abd  3 x 10 5# clam, fire hyd and hip abd (0# for abd) 3x10 ea   Clam shell tband h/l nv        D/c    Standing fire hydrants mod.  nv  - -  2 x 10 B 2x10 B S/l 2x10 nc    Reviewed and issued HEP POC , tx plan, goals and aerobic ex, use of AD all times                   Neuro Re-Ed                    Mini squats   Amb 1 lap, 1 lap x 2 yellow pllyoball 2m rest b/w weighted amb    --    " "-       SLB floor  SLB Air Ex  4 x  B10-15 RLE 1 UE A nv Front lunge reaches 15x ea cues posture Front lung reaches 15 x ea cues posture  - Floor and Air Ex  3 x 10-20\"//Air ex step up plus LLE 2 x 10 -    Step ups 6\" r and L le 3 x 10 Lateral amb with otb 3 laps Lat amb with no tband 6 laps Lat amb with no tband 6 laps Lat amb GTB 4 laps Lat amb no tb 4laps Lat amb 6 laps no tb Lat amb gtb 6 laps 6\" R LE 10x, 4\" R LE 15x f! 6\" RLE & LLE  10x ea   Step taps 4\"   Amb without AD 4 laps- cues longer strides Amb without AD 6 laps- cues longer strides Amb with out AD 8 laps cues posture, stride length Amb without ad wait to door 4 laps 1.5 w/slc w/gati decline R trendelen Amb w/o ad 6 laps fatigue Sofia amb -high 4 laps of 3 -    Weight shifting  F/B, S to S //bars    Long strides 4 laps //bars         Ther-Act              STS  -   --  -         Amb/gait tr  - - Stair amb U HR + SLC 1 lap, non recip  -  1.5 laps fatigue! Cues long strides , m activ 2 laps f! R LB 3 laps with one seated rest break 2* fatigue                  Modalities                    CP L hip HPLB seated 10m HP pre tx 10 m HP pre tx 10m                                                                                              "

## 2025-05-15 ENCOUNTER — APPOINTMENT (OUTPATIENT)
Dept: PHYSICAL THERAPY | Facility: CLINIC | Age: 75
End: 2025-05-15
Attending: ORTHOPAEDIC SURGERY
Payer: MEDICARE

## 2025-05-16 ENCOUNTER — APPOINTMENT (OUTPATIENT)
Dept: PHYSICAL THERAPY | Facility: CLINIC | Age: 75
End: 2025-05-16
Attending: ORTHOPAEDIC SURGERY
Payer: MEDICARE

## 2025-05-19 ENCOUNTER — EVALUATION (OUTPATIENT)
Dept: PHYSICAL THERAPY | Facility: CLINIC | Age: 75
End: 2025-05-19
Attending: ORTHOPAEDIC SURGERY
Payer: MEDICARE

## 2025-05-19 DIAGNOSIS — R26.9 GAIT DIFFICULTY: ICD-10-CM

## 2025-05-19 DIAGNOSIS — M25.552 ACUTE POSTOPERATIVE PAIN OF LEFT HIP: ICD-10-CM

## 2025-05-19 DIAGNOSIS — Z96.642 HISTORY OF LEFT HIP REPLACEMENT: ICD-10-CM

## 2025-05-19 DIAGNOSIS — M16.12 OSTEOARTHRITIS OF LEFT HIP, UNSPECIFIED OSTEOARTHRITIS TYPE: Primary | ICD-10-CM

## 2025-05-19 DIAGNOSIS — G89.18 ACUTE POSTOPERATIVE PAIN OF LEFT HIP: ICD-10-CM

## 2025-05-19 PROCEDURE — 97110 THERAPEUTIC EXERCISES: CPT | Performed by: PHYSICAL THERAPIST

## 2025-05-19 NOTE — LETTER
May 20, 2025    Nilo Ramos MD  [address]  [facility]     Patient: RACHNA Pulido   YOB: 1950   Date of Visit: 2025     Encounter Diagnosis     ICD-10-CM    1. Osteoarthritis of left hip, unspecified osteoarthritis type  M16.12       2. Acute postoperative pain of left hip  G89.18     M25.552       3. Gait difficulty  R26.9       4. History of left hip replacement  Z96.642           Dear Dr. Nilo Ramos MD:    Thank you for your recent referral of RACHNA Pulido. Please review the attached evaluation summary from A Porsche's recent visit.     Please verify that you agree with the plan of care by signing the attached order.     If you have any questions or concerns, please do not hesitate to call.     I sincerely appreciate the opportunity to share in the care of one of your patients and hope to have another opportunity to work with you in the near future.       Sincerely,    Charito Nunn, PT      Referring Provider:      I certify that I have read the below Plan of Care and certify the need for these services furnished under this plan of treatment while under my care.                    Nilo Ramos MD  [address]  [facility]   Via Fax: [phone]          PT Re-Evaluation     Today's date: 2024  Patient name: RACHNA Pulido  : 1950  MRN: [id number]  Referring provider: Nilo Ramos MD  Dx:   Encounter Diagnosis     ICD-10-CM    1. Osteoarthritis of left hip, unspecified osteoarthritis type  M16.12       2. Acute postoperative pain of left hip  G89.18     M25.552       3. Gait difficulty  R26.9       4. History of left hip replacement  Z96.642                       Start Time: 1115  Stop Time: 1200  Total time in clinic (min): 45 minutes    Assessment  Impairments: abnormal gait, abnormal or restricted ROM, impaired physical strength, poor posture  and endurance  Functional limitations: 5 x sit to stand  20.6  with use  of B UE on chair seat, hesitancy with initation of amb following sit to stand  Symptom irritability: low  Irritability comments: left hip    Assessment details: The patient is a 73 y/o female who presents to PT s/p L THR  2/20/25 after discharge from home PT 3/7/2025. Pt presents with gait dysfunction involving RLE Greater than L,  healing incision, decrease in L hip ROM and strength, balance of single limb, decrease in amb endurance, gait dysfunction on levels and stairs, decrease in standing time and ability to complete ADLs.  Pt has hx of chronic back pain, lumbar surgery including fusion and current thoracolumbar scoliosis, R hip surgery (R hip trochanteric bursectomy with gluteus medius repair and IT band transfer) on 2/15/24.  She has complaints of intermittent pulling/pain in her  L hip.  She demonstrates deficits with decreased ROM and strength, decreased flexibility, decreased balance and proprioception and edema.  These deficits lead to difficulty with stair negotiation, gait dysfunction and pain with completing her ADLs and tasks at home.  Pt amb   with SLC  60ft distance with dec NIMESH, dec stride length, dec RLE ankle df/pf, decrease trunk rotation and RUE arm swing.  Pt will benefit from  skilled care to achieve PT goals of care.         Update 4/9/2025- Pt has been treated in PT 2-3 x per week since IE and is progressing toward all goals for L hip. She demonstrates improved hip ROM, improved hip strength, improved core strength, improved scar mobility, decrease in pain. She notes no specific functional limitations with L hip at this time but finds R LBP as limiting with standing and walking function. She cont to use SLC in LUE to assist with gait mechanics and RLBP.  SLB LLE current =  6-7   seconds.  Unable RLE without L UE A secondary to + Trendelenberg. Pt will cont to benefit from skilled care to achieve outlined goals of care. Potential for pain management interventions to assist with management of  chronic back pain with cont strength of core and B Hips anticipated to improved functional WB tolerances.     Update 5/19/2025- Pt progressing with L hip strength current level 4+/5 for flexion, extension and abduction. Pt LLE SLB up to 12 seconds without UE A prior to sway and level pelvis demonstrated, no pain sx.  RLE SLB with finger tip A 10-15 seconds- without finger tip A demonstrates minimal Trendelenberg R LE. Pt amb. Distances of 80-90 ft. With out AD with upright posture, decrease in trunk rotation and R arm swing, heel toe gait. Pt notes very good pain control Lhip and improved lower back discomfort recently which she also notes improved her WB tolerances in addition to L hip status. Pt notes she is progressing toward her goals but does feel she has not achieved ambulatory goals  and confidence to return to her WB functional tolerances.     Understanding of Dx/Px/POC: excellent     Prognosis: excellent    Goals  STGs:  1.  Initiate and complete HEP with verbal cues- achieved  2.  Improve L hip ROM by 5-10 degrees flexion, abduction, R ankle pf, df- achieved  3.  Improve L and R  LE strength by 1/2 grade  for hip flexion, extension, abduction, R and L knee flex, ext, B ankle pf,df achieved  4.  Decrease L hip pain by 25% -achieved  5. SLB either LE = 10-15 seconds- current SLB LLE  12  seconds achieved for L , RLE SLB requires finger tip assist with 10-15 sec tolerance  LTGs: progressing  1.  Patient to be I with HEP in 12 weeks.  2.  Improve L  Hip ROM to WFLs  t/o in 12 weeks to improve function. achieved  3.  Improve L  and R  LE strength 4 to 4+/5 t/o in 12 weeks to improve function. Achieved LLE but does present with strength R hip abd, extension 4-/5  4.  Decrease L  hip pain to < or = to 1/10 with activity in 12 weeks to improve function.  achieved  5.  Patient to ambulate with normalized gait pattern without AD in 12 weeks.- 6 minute gait 3.5  hallway laps SLC fatigue today amb 80-90 ft.  Without  "AD, 6 min walk test not completed t!  6.  Stair negotiation is improved to reciprocal  with U HR A in 12 weeks - achieved with UHR and SLC use , not assessed today t!              Plan  Patient would benefit from: skilled physical therapy  Planned modality interventions: cryotherapy    Planned therapy interventions: abdominal trunk stabilization, gait training, functional ROM exercises, stretching, strengthening, neuromuscular re-education, manual therapy, therapeutic exercise, postural training, patient education, self care and balance/weight bearing training    Frequency: 2x week  Duration in weeks: 4  Plan of Care beginning date: 5/6/2025  Plan of Care expiration date: 6/3/2025  Treatment plan discussed with: patient  Plan details:          Subjective Evaluation    History of Present Illness  Mechanism of injury:  Pt underwent L THR 2/20/25 and discharged to home 2/21/25. She is using shower chair , toilet frame, walker to amb long distances.  She is using medications of  tramadol and tylenol x strength to manage her pain. Pt notes her legs \"finally feel equal length\" for first time since undergoing R THR years ago. Pt did receive home care PT and was discharged Friday, March 7. Pt notes she ihas resolve of pre operative  L hip pain sx.  She notes some chronic LBP and prior hx of spinal surgery. Pt notes she does feel less endurance for standing and walking since surgery but is slowly improving.  She notes sleeping well.  She follows with surgeon tomorrow. She is using cane for all amb in L UE, stating  tried to use in RUE but gait is not good with use in RUE.  Pt states she is amb on stairs nonreciprocal gait. She notes  assists with  all home keeping tasks.     Update 4/9/2025- Pt reports left hip is doing well and becoming stronger. She notes good pain control of L hip and improving scar mobility. Pt notes standing and walking not limited by L hip. Pt notes she is amb on stairs with reciprocal gait and " BUE A of HR or U UE HR and SLC assist. Pt notes R sided LBP as limiting with weight bearing function. She notes being very pleased with improved ability regarding L hip.  She is riding stationary bike 2 x per day at home and notes some improvements with endurance. She is  not walking long distances or for exercise at this time secondary to LBP. Pt is working with her PCP and upcoming appt with pain management specialist for her LBP.     Update 25- Pt notes she has made good progress since last evaluation by her surgeon. She has undergoing pain management procedure helping her back pain and finds with her increase in L hip strength, amb is becoming better but is still concerned about fatigue and longer distance ambulation. She notes compliance with her HEP. Pt notes she is very pleased with progress with L hip for functional WB. She notes no L hip pain. Pt notes some weakness in R hip as limiting with WB function. She notes not using a cane since last week but does hold 's arm when outdoors prn ie [place].   Quality of life: excellent    Patient Goals  Patient goals for therapy: decreased pain, increased strength, increased motion and independence with ADLs/IADLs  Patient goal: to amb witho ut A device, improve on conditioning and fitness level to walk and standing  longer,  to stand to complete cooking, cleaning, ambulation longer and on stairs improved  Pain  Current pain ratin  At best pain ratin  At worst pain ratin  Location: R lower back pain, pt denies any L hip pain  Quality: pressure and pulling  Relieving factors: change in position, medications, rest and heat  Aggravating factors: standing and walking  Progression: improved    Social Support  Steps to enter house: yes  1  Stairs in house: yes   Lives in: multiple-level home  Lives with: spouse    Employment status: not working (retired)  Hand dominance: right    Treatments  Previous treatment: physical therapy, medication and  injection treatment  Discharged from (in last 30 days) comments: discahrged post op day 2 from hospital.         Objective     Palpation   Left   Tenderness of the iliopsoas and lumbar paraspinals.     Right   Tenderness of the lumbar paraspinals.     Lumbar Screen  Lumbar range of motion within normal limits with the following exceptions:Decrease in lumbar extension mobility  severe, chronic    Neurological Testing     Sensation     Hip   Left Hip   Intact: light touch    Right Hip   Intact: light touch    Additional Neurological Details  Intermittent tremor RLE noted, pt reports she has spoken with her pcp regarding this   Update 5/19/25- no tremor noted today    Active Range of Motion   Left Hip   Flexion: 105 degrees   Extension: 5 degrees   Abduction: 30 degrees     Right Hip   Flexion: 100 degrees   Extension: 5 degrees   Abduction: 30 degrees     Strength/Myotome Testing     Left Hip   Planes of Motion   Flexion: 4+  Extension: 4+  Abduction: 4+  Adduction: 4+    Right Hip   Planes of Motion   Flexion: 4+  Extension: 4-  Abduction: 4-    Left Knee   Flexion: 4+  Extension: 4+    Right Knee   Flexion: 3-  Extension: 3-  Quadriceps contraction: fair    Left Ankle/Foot   Dorsiflexion: 4+  Plantar flexion: 4+  Inversion: 4+  Eversion: 4+  Great toe flexion: 4+  Great toe extension: 4+    Right Ankle/Foot   Dorsiflexion: 4+  Plantar flexion: 4+  Eversion: 4+  Great toe flexion: 4+  Great toe extension: 4+    Additional Strength Details  R knee ext 4/5, flex 4/5  L knee ext 4+/5, flex 4/+5    Ambulation   Weight-Bearing Status   Assistive device used: single point cane and two-wheeled walker    Additional Weight-Bearing Status Details  Using SLC for household and short community distances, in LUE.  NBOS demonstrated with dec in R ankle df, pf through gait cycle.  Pt demonstrates safety with amb in tx  60 ft x 2 but does note some fatigue of Les at that distance with pre 3-4/10.   Pt reports uses walker at home for  longer distances.   Update 4/9/2025- Pt is amb with SLC in LUE to assist with gait mechanics and pain regarding RLE/R LB. Left hip with negative trendelenberg. Pt trunk in flexion and rigid with no arm swing or trunk rotation during amb. Dec stance RLE. Pt able to amb short distances with AD use but gait deteriorates secondary to pain R LB.   Update 5/19/2025- Pt amb with out AD to tx. She present with improved trunk posturing, no longer flexed. Decrease in trunk rotation and arm swing, rigid trunk and RUE with amb.  Pt demonstrates good bashir. Pt amb distances of 80-90 ft. With heel toe gait demonstrated, no AD use.             Ambulation: Stairs   Ascend stairs: independent  Pattern: reciprocal  Railings: one rail  Descend stairs: independent  Railings: one rail    Observational Gait   Decreased walking speed, stride length, left step length and right step length.   Left foot contact pattern: heel to toe  Right foot contact pattern: foot flat  Left arm swing: decreased  Right arm swing: decreased  Base of support: decreased    Additional Observational Gait Details  Pt has slight trunk flexed posture with amb. Scoliosis through thoracolumbar spine observed.    General Comments:      Hip Comments   6 minute walk test 3.5 laps hallway fatigue, no pain  Update 5/19/25 not tested, t!         Precautions: DOS:  L THR ANT approach 2/20/25,  DOS 2/15/24 (R hip trochanteric bursectomy with gluteus medius repair and IT band transfer), Lumbar surgery, R THR, HTN      Daily Treatment Diary:     Initial Evaluation Date: 03/11/25  Compliance 5/14 5/19 4/14 4/16 4/18 4/23 4/25 4/30 5/5 5/12   Visit Number 21 22 13 14 15 16 17 18 19 20   Re-Eval   Y                Foto Captured                        5/14 5/19 4/14 4/16 4/18 4/23 4/25 4/30 5/5 5/12   Manual             L Hip PROM- ant hip prec 10m - Pc 10m      10 min  pc 10'                STM L  scar, hip / lumbar soft tissue - - nv nv -    -    Ther-Ex                   "  nustep   Bike at home Bike at home - -  St hip 4 ways 2 x 15, TR/HR 30x nc    T abd, flex, leg curl B leg bridge berry  with blue plyoball 3 x 10 B leg bridge berry /blue plyoball 3 x 10 B bridge with green tband abd iso 2 x 10 B bridge with btb 2 x 10, abd iso B bridge w/btb 2 x 10, abd iso B bridge w/btb + yplyoball at pelvis2 x 10, abd iso B bridge w/berrytb + yplyoball at pelvis2 x 10, abd iso - B bridge with berrytb 3 x 10, B bridge with berry TB 3x10   BLE to U LE bridge with TA set U bridge with blue plyoball 3 x 10 U bridge with blue plyoball 3 x 10 U bridge as cee 2 x 5 reps ea, cues for form U  bridge 10x ea U bridge U bridge 3 x 10 ea U bridge 3x10  U bridge yellow plyoball at abdomen 3 x 10 U bridge yellow plyoball at abdomen 3x10   LAQ/SAQ Bkfos 10x10\" Bkfo 10x10\" Saq 4 RandL 5# 3 x 15, 5\" Saq 4 RandL 5# 3 x 15, 5\" Saq r and L 7#, 3 x 15, 5\" Saq R  and L 7# 3 x 10 Saq R and L  8# 3 x 10  Bkfos 10x10\" stretch with ta Bkfos 10x 10\" stretch with ta   QSets -H/L lift overs 6 B 3 x 10 H/l lift overs 4-5 blzpods 3 x 10  Supinelift overs 3 blzpds 3 x 10 ea H/l lift overs 2-3 blz pods 2 x 10 ea H/l lift overs 4 levels of blz pods 3 x 10 H/l lift overs 4 levels of blz  H/l lift overs 3-4 levels of blz 2 x 10f! L hip flexors H/L lift overs 3 L   5 R levels of blz 2x10 hip flexors   ASLRS  - - H/l march with BTB 3x 10 H/l march btb 3 x 10 nc nc nc  nc    Clam shells, hip abd with ext knee in s/l 5# weight proximal to knee  3 x 10  6# 3 x 10 prox to knee 4# 3x 10// hip abd with knee ext  3 x 10 4# 3 x 10, 1# str knee abd 3 x 10 4# 4 x 10, 4# fire hydr 4 x 10 1# slr knee abd 4 x 10 5# 3 x 10, 2# at thigh SLR 3 x 10 only 5# 3 x 10, and fire hydrants2# at thigh SLR 3 x 10 only  5# clam, fire hyd and hip abd  3 x 10 5# clam, fire hyd and hip abd (0# for abd) 3x10 ea   Clam shell tband h/l nv -       D/c    Standing fire hydrants mod.  nv - - -  2 x 10 B 2x10 B S/l 2x10 nc    Reviewed and issued HEP POC , tx plan, " "goals and aerobic ex, use of AD all times                   Neuro Re-Ed                    Mini squats   Amb 1 lap, 1 lap x 2 yellow pllyoball 2m rest b/w weighted amb Amb  1 lap    --    -       SLB floor  SLB Air Ex  4 x  B10-15 RLE 1 UE A nv Front lunge reaches 15x ea cues posture Front lung reaches 15 x ea cues posture  - Floor and Air Ex  3 x 10-20\"//Air ex step up plus LLE 2 x 10 -    Step ups 6\" r and L le 3 x 10 - Lat amb with no tband 6 laps Lat amb with no tband 6 laps Lat amb GTB 4 laps Lat amb no tb 4laps Lat amb 6 laps no tb Lat amb gtb 6 laps 6\" R LE 10x, 4\" R LE 15x f! 6\" RLE & LLE  10x ea   Step taps 4\"   Amb without AD 4 laps- cues longer strides Amb without AD 6 laps- cues longer strides Amb with out AD 8 laps cues posture, stride length Amb without ad wait to door 4 laps 1.5 w/slc w/gati decline R trendelen Amb w/o ad 6 laps fatigue Sofia amb -high 4 laps of 3 -    Weight shifting  F/B, S to S //bars    Long strides 4 laps //bars         Ther-Act              STS  -   --  -         Amb/gait tr  - - Stair amb U HR + SLC 1 lap, non recip  -  1.5 laps fatigue! Cues long strides , m activ 2 laps f! R LB 3 laps with one seated rest break 2* fatigue                  Modalities                    CP L hip HPLB seated 10m HP pre tx 10 m HP pre tx 10m                                                                                                                  "

## 2025-05-19 NOTE — PROGRESS NOTES
PT Re-Evaluation     Today's date: 2024  Patient name: RACHNA Pulido  : 1950  MRN: [id number]  Referring provider: Nilo Ramos MD  Dx:   Encounter Diagnosis     ICD-10-CM    1. Osteoarthritis of left hip, unspecified osteoarthritis type  M16.12       2. Acute postoperative pain of left hip  G89.18     M25.552       3. Gait difficulty  R26.9       4. History of left hip replacement  Z96.642                       Start Time: 1115  Stop Time: 1200  Total time in clinic (min): 45 minutes    Assessment  Impairments: abnormal gait, abnormal or restricted ROM, impaired physical strength, poor posture  and endurance  Functional limitations: 5 x sit to stand  20.6  with use of B UE on chair seat, hesitancy with initation of amb following sit to stand  Symptom irritability: low  Irritability comments: left hip    Assessment details: The patient is a 73 y/o female who presents to PT s/p L THR  25 after discharge from home PT 3/7/2025. Pt presents with gait dysfunction involving RLE Greater than L,  healing incision, decrease in L hip ROM and strength, balance of single limb, decrease in amb endurance, gait dysfunction on levels and stairs, decrease in standing time and ability to complete ADLs.  Pt has hx of chronic back pain, lumbar surgery including fusion and current thoracolumbar scoliosis, R hip surgery (R hip trochanteric bursectomy with gluteus medius repair and IT band transfer) on 2/15/24.  She has complaints of intermittent pulling/pain in her  L hip.  She demonstrates deficits with decreased ROM and strength, decreased flexibility, decreased balance and proprioception and edema.  These deficits lead to difficulty with stair negotiation, gait dysfunction and pain with completing her ADLs and tasks at home.  Pt amb   with SLC  60ft distance with dec NIMESH, dec stride length, dec RLE ankle df/pf, decrease trunk rotation and RUE arm swing.  Pt will benefit from  skilled care to achieve PT goals  of care.         Update 4/9/2025- Pt has been treated in PT 2-3 x per week since IE and is progressing toward all goals for L hip. She demonstrates improved hip ROM, improved hip strength, improved core strength, improved scar mobility, decrease in pain. She notes no specific functional limitations with L hip at this time but finds R LBP as limiting with standing and walking function. She cont to use SLC in LUE to assist with gait mechanics and RLBP.  SLB LLE current =  6-7   seconds.  Unable RLE without L UE A secondary to + Trendelenberg. Pt will cont to benefit from skilled care to achieve outlined goals of care. Potential for pain management interventions to assist with management of chronic back pain with cont strength of core and B Hips anticipated to improved functional WB tolerances.     Update 5/19/2025- Pt progressing with L hip strength current level 4+/5 for flexion, extension and abduction. Pt LLE SLB up to 12 seconds without UE A prior to sway and level pelvis demonstrated, no pain sx.  RLE SLB with finger tip A 10-15 seconds- without finger tip A demonstrates minimal Trendelenberg R LE. Pt amb. Distances of 80-90 ft. With out AD with upright posture, decrease in trunk rotation and R arm swing, heel toe gait. Pt notes very good pain control Lhip and improved lower back discomfort recently which she also notes improved her WB tolerances in addition to L hip status. Pt notes she is progressing toward her goals but does feel she has not achieved ambulatory goals  and confidence to return to her WB functional tolerances.     Understanding of Dx/Px/POC: excellent     Prognosis: excellent    Goals  STGs:  1.  Initiate and complete HEP with verbal cues- achieved  2.  Improve L hip ROM by 5-10 degrees flexion, abduction, R ankle pf, df- achieved  3.  Improve L and R  LE strength by 1/2 grade  for hip flexion, extension, abduction, R and L knee flex, ext, B ankle pf,df achieved  4.  Decrease L hip pain by 25%  "-achieved  5. SLB either LE = 10-15 seconds- current SLB LLE  12  seconds achieved for L , RLE SLB requires finger tip assist with 10-15 sec tolerance  LTGs: progressing  1.  Patient to be I with HEP in 12 weeks.  2.  Improve L  Hip ROM to WFLs  t/o in 12 weeks to improve function. achieved  3.  Improve L  and R  LE strength 4 to 4+/5 t/o in 12 weeks to improve function. Achieved LLE but does present with strength R hip abd, extension 4-/5  4.  Decrease L  hip pain to < or = to 1/10 with activity in 12 weeks to improve function.  achieved  5.  Patient to ambulate with normalized gait pattern without AD in 12 weeks.- 6 minute gait 3.5  hallway laps SLC fatigue today amb 80-90 ft.  Without AD, 6 min walk test not completed t!  6.  Stair negotiation is improved to reciprocal  with U HR A in 12 weeks - achieved with UHR and SLC use , not assessed today t!              Plan  Patient would benefit from: skilled physical therapy  Planned modality interventions: cryotherapy    Planned therapy interventions: abdominal trunk stabilization, gait training, functional ROM exercises, stretching, strengthening, neuromuscular re-education, manual therapy, therapeutic exercise, postural training, patient education, self care and balance/weight bearing training    Frequency: 2x week  Duration in weeks: 4  Plan of Care beginning date: 5/6/2025  Plan of Care expiration date: 6/3/2025  Treatment plan discussed with: patient  Plan details:          Subjective Evaluation    History of Present Illness  Mechanism of injury:  Pt underwent L THR 2/20/25 and discharged to home 2/21/25. She is using shower chair , toilet frame, walker to amb long distances.  She is using medications of  tramadol and tylenol x strength to manage her pain. Pt notes her legs \"finally feel equal length\" for first time since undergoing R THR years ago. Pt did receive home care PT and was discharged Friday, March 7. Pt notes she ihas resolve of pre operative  L hip " pain sx.  She notes some chronic LBP and prior hx of spinal surgery. Pt notes she does feel less endurance for standing and walking since surgery but is slowly improving.  She notes sleeping well.  She follows with surgeon tomorrow. She is using cane for all amb in L UE, stating  tried to use in RUE but gait is not good with use in RUE.  Pt states she is amb on stairs nonreciprocal gait. She notes  assists with  all home keeping tasks.     Update 4/9/2025- Pt reports left hip is doing well and becoming stronger. She notes good pain control of L hip and improving scar mobility. Pt notes standing and walking not limited by L hip. Pt notes she is amb on stairs with reciprocal gait and BUE A of HR or U UE HR and SLC assist. Pt notes R sided LBP as limiting with weight bearing function. She notes being very pleased with improved ability regarding L hip.  She is riding stationary bike 2 x per day at home and notes some improvements with endurance. She is  not walking long distances or for exercise at this time secondary to LBP. Pt is working with her PCP and upcoming appt with pain management specialist for her LBP.     Update 5/19/25- Pt notes she has made good progress since last evaluation by her surgeon. She has undergoing pain management procedure helping her back pain and finds with her increase in L hip strength, amb is becoming better but is still concerned about fatigue and longer distance ambulation. She notes compliance with her HEP. Pt notes she is very pleased with progress with L hip for functional WB. She notes no L hip pain. Pt notes some weakness in R hip as limiting with WB function. She notes not using a cane since last week but does hold 's arm when outdoors prn ie [place].   Quality of life: excellent    Patient Goals  Patient goals for therapy: decreased pain, increased strength, increased motion and independence with ADLs/IADLs  Patient goal: to amb witho ut A device, improve on  conditioning and fitness level to walk and standing  longer,  to stand to complete cooking, cleaning, ambulation longer and on stairs improved  Pain  Current pain ratin  At best pain ratin  At worst pain ratin  Location: R lower back pain, pt denies any L hip pain  Quality: pressure and pulling  Relieving factors: change in position, medications, rest and heat  Aggravating factors: standing and walking  Progression: improved    Social Support  Steps to enter house: yes  1  Stairs in house: yes   Lives in: multiple-level home  Lives with: spouse    Employment status: not working (retired)  Hand dominance: right    Treatments  Previous treatment: physical therapy, medication and injection treatment  Discharged from (in last 30 days) comments: discahrged post op day 2 from hospital.         Objective     Palpation   Left   Tenderness of the iliopsoas and lumbar paraspinals.     Right   Tenderness of the lumbar paraspinals.     Lumbar Screen  Lumbar range of motion within normal limits with the following exceptions:Decrease in lumbar extension mobility  severe, chronic    Neurological Testing     Sensation     Hip   Left Hip   Intact: light touch    Right Hip   Intact: light touch    Additional Neurological Details  Intermittent tremor RLE noted, pt reports she has spoken with her pcp regarding this   Update 25- no tremor noted today    Active Range of Motion   Left Hip   Flexion: 105 degrees   Extension: 5 degrees   Abduction: 30 degrees     Right Hip   Flexion: 100 degrees   Extension: 5 degrees   Abduction: 30 degrees     Strength/Myotome Testing     Left Hip   Planes of Motion   Flexion: 4+  Extension: 4+  Abduction: 4+  Adduction: 4+    Right Hip   Planes of Motion   Flexion: 4+  Extension: 4-  Abduction: 4-    Left Knee   Flexion: 4+  Extension: 4+    Right Knee   Flexion: 3-  Extension: 3-  Quadriceps contraction: fair    Left Ankle/Foot   Dorsiflexion: 4+  Plantar flexion: 4+  Inversion:  4+  Eversion: 4+  Great toe flexion: 4+  Great toe extension: 4+    Right Ankle/Foot   Dorsiflexion: 4+  Plantar flexion: 4+  Eversion: 4+  Great toe flexion: 4+  Great toe extension: 4+    Additional Strength Details  R knee ext 4/5, flex 4/5  L knee ext 4+/5, flex 4/+5    Ambulation   Weight-Bearing Status   Assistive device used: single point cane and two-wheeled walker    Additional Weight-Bearing Status Details  Using SLC for household and short community distances, in LUE.  NBOS demonstrated with dec in R ankle df, pf through gait cycle.  Pt demonstrates safety with amb in tx  60 ft x 2 but does note some fatigue of Les at that distance with pre 3-4/10.   Pt reports uses walker at home for longer distances.   Update 4/9/2025- Pt is amb with SLC in LUE to assist with gait mechanics and pain regarding RLE/R LB. Left hip with negative trendelenberg. Pt trunk in flexion and rigid with no arm swing or trunk rotation during amb. Dec stance RLE. Pt able to amb short distances with AD use but gait deteriorates secondary to pain R LB.   Update 5/19/2025- Pt amb with out AD to tx. She present with improved trunk posturing, no longer flexed. Decrease in trunk rotation and arm swing, rigid trunk and RUE with amb.  Pt demonstrates good bashir. Pt amb distances of 80-90 ft. With heel toe gait demonstrated, no AD use.             Ambulation: Stairs   Ascend stairs: independent  Pattern: reciprocal  Railings: one rail  Descend stairs: independent  Railings: one rail    Observational Gait   Decreased walking speed, stride length, left step length and right step length.   Left foot contact pattern: heel to toe  Right foot contact pattern: foot flat  Left arm swing: decreased  Right arm swing: decreased  Base of support: decreased    Additional Observational Gait Details  Pt has slight trunk flexed posture with amb. Scoliosis through thoracolumbar spine observed.    General Comments:      Hip Comments   6 minute walk test 3.5  "[facility] hallway fatigue, no pain  Update 5/19/25 not tested, t!         Precautions: DOS:  L THR ANT approach 2/20/25,  DOS 2/15/24 (R hip trochanteric bursectomy with gluteus medius repair and IT band transfer), Lumbar surgery, R THR, HTN      Daily Treatment Diary:     Initial Evaluation Date: 03/11/25  Compliance 5/14 5/19 4/14 4/16 4/18 4/23 4/25 4/30 5/5 5/12   Visit Number [id number] 13 14 15 16 17 18 19 20   Re-Eval   Y                Foto Captured                        5/14 5/19 4/14 4/16 4/18 4/23 4/25 4/30 5/5 5/12   Manual             L Hip PROM- ant hip prec 10m - Pc 10m      10 min  pc 10'                STM L  scar, hip / lumbar soft tissue - - nv nv -    -    Ther-Ex                    nustep   Bike at home Bike at home - -  St hip 4 ways 2 x 15, TR/HR 30x nc    T abd, flex, leg curl B leg bridge berry  with blue plyoball 3 x 10 B leg bridge berry /blue plyoball 3 x 10 B bridge with green tband abd iso 2 x 10 B bridge with btb 2 x 10, abd iso B bridge w/btb 2 x 10, abd iso B bridge w/btb + yplyoball at pelvis2 x 10, abd iso B bridge w/berrytb + yplyoball at pelvis2 x 10, abd iso - B bridge with berrytb 3 x 10, B bridge with berry TB 3x10   BLE to U LE bridge with TA set U bridge with blue plyoball 3 x 10 U bridge with blue plyoball 3 x 10 U bridge as cee 2 x 5 reps ea, cues for form U  bridge 10x ea U bridge U bridge 3 x 10 ea U bridge 3x10  U bridge yellow plyoball at abdomen 3 x 10 U bridge yellow plyoball at abdomen 3x10   LAQ/SAQ Bkfos 10x10\" Bkfo 10x10\" Saq 4 RandL 5# 3 x 15, 5\" Saq 4 RandL 5# 3 x 15, 5\" Saq r and L 7#, 3 x 15, 5\" Saq R  and L 7# 3 x 10 Saq R and L  8# 3 x 10  Bkfos 10x10\" stretch with ta Bkfos 10x 10\" stretch with ta   QSets -H/L lift overs 6 B 3 x 10 H/l lift overs 4-5 blzpods 3 x 10  Supinelift overs 3 blzpds 3 x 10 ea H/l lift overs 2-3 blz pods 2 x 10 ea H/l lift overs 4 levels of blz pods 3 x 10 H/l lift overs 4 levels of blz  H/l lift overs 3-4 levels of blz 2 x 10f! L hip " "flexors H/L lift overs 3 L   5 R levels of blz 2x10 hip flexors   ASLRS  - - H/l march with BTB 3x 10 H/l march btb 3 x 10 nc nc nc  nc    Clam shells, hip abd with ext knee in s/l 5# weight proximal to knee  3 x 10  6# 3 x 10 prox to knee 4# 3x 10// hip abd with knee ext  3 x 10 4# 3 x 10, 1# str knee abd 3 x 10 4# 4 x 10, 4# fire hydr 4 x 10 1# slr knee abd 4 x 10 5# 3 x 10, 2# at thigh SLR 3 x 10 only 5# 3 x 10, and fire hydrants2# at thigh SLR 3 x 10 only  5# clam, fire hyd and hip abd  3 x 10 5# clam, fire hyd and hip abd (0# for abd) 3x10 ea   Clam shell tband h/l nv -       D/c    Standing fire hydrants mod.  nv - - -  2 x 10 B 2x10 B S/l 2x10 nc    Reviewed and issued HEP POC , tx plan, goals and aerobic ex, use of AD all times                   Neuro Re-Ed                    Mini squats   Amb 1 lap, 1 lap x 2 yellow pllyoball 2m rest b/w weighted amb Amb  1 lap    --    -       SLB floor  SLB Air Ex  4 x  B10-15 RLE 1 UE A nv Front lunge reaches 15x ea cues posture Front lung reaches 15 x ea cues posture  - Floor and Air Ex  3 x 10-20\"//Air ex step up plus LLE 2 x 10 -    Step ups 6\" r and L le 3 x 10 - Lat amb with no tband 6 laps Lat amb with no tband 6 laps Lat amb GTB 4 laps Lat amb no tb 4laps Lat amb 6 laps no tb Lat amb gtb 6 laps 6\" R LE 10x, 4\" R LE 15x f! 6\" RLE & LLE  10x ea   Step taps 4\"   Amb without AD 4 laps- cues longer strides Amb without AD 6 laps- cues longer strides Amb with out AD 8 laps cues posture, stride length Amb without ad wait to door 4 laps 1.5 w/slc w/gati decline R trendelen Amb w/o ad 6 laps fatigue Sofia amb -high 4 laps of 3 -    Weight shifting  F/B, S to S //bars    Long strides 4 laps //bars         Ther-Act              STS  -   --  -         Amb/gait tr  - - Stair amb U HR + SLC 1 lap, non recip  -  1.5 laps fatigue! Cues long strides , m activ 2 laps f! R LB 3 laps with one seated rest break 2* fatigue                  Modalities                    CP L hip HPLB " seated 10m HP pre tx 10 m HP pre tx 10m

## 2025-05-28 ENCOUNTER — APPOINTMENT (OUTPATIENT)
Dept: PHYSICAL THERAPY | Facility: CLINIC | Age: 75
End: 2025-05-28
Attending: ORTHOPAEDIC SURGERY
Payer: MEDICARE

## 2025-06-02 ENCOUNTER — OFFICE VISIT (OUTPATIENT)
Dept: PHYSICAL THERAPY | Facility: CLINIC | Age: 75
End: 2025-06-02
Attending: ORTHOPAEDIC SURGERY
Payer: MEDICARE

## 2025-06-02 DIAGNOSIS — R26.9 GAIT DIFFICULTY: ICD-10-CM

## 2025-06-02 DIAGNOSIS — M25.552 ACUTE POSTOPERATIVE PAIN OF LEFT HIP: ICD-10-CM

## 2025-06-02 DIAGNOSIS — Z96.642 HISTORY OF LEFT HIP REPLACEMENT: ICD-10-CM

## 2025-06-02 DIAGNOSIS — G89.18 ACUTE POSTOPERATIVE PAIN OF LEFT HIP: ICD-10-CM

## 2025-06-02 DIAGNOSIS — M16.12 OSTEOARTHRITIS OF LEFT HIP, UNSPECIFIED OSTEOARTHRITIS TYPE: Primary | ICD-10-CM

## 2025-06-02 PROCEDURE — 97140 MANUAL THERAPY 1/> REGIONS: CPT | Performed by: PHYSICAL THERAPIST

## 2025-06-02 PROCEDURE — 97110 THERAPEUTIC EXERCISES: CPT | Performed by: PHYSICAL THERAPIST

## 2025-06-02 NOTE — PROGRESS NOTES
Daily Note     Today's date: 2025  Patient name: RACHNA Pulido  : 1950  MRN: [id number]  Referring provider: Nilo Ramos MD  Dx:   Encounter Diagnosis     ICD-10-CM    1. Osteoarthritis of left hip, unspecified osteoarthritis type  M16.12       2. Acute postoperative pain of left hip  G89.18     M25.552       3. Gait difficulty  R26.9       4. History of left hip replacement  Z96.642           Start Time: 1530          Subjective: Pt reports she has some pain in R LB rated 3-4/10 this date but notes less pain in am possibly with increase in dose of cymbalta she stated.  Pt notes recent injections by pain management did not help her. Overall with R LBP sx and feels she cont to be limited with amb and standing secondary to her back pain. She notes occasional mild left lateral hip pain sx  1/10 mostly if lies onto L side.  Pt notes having upcoming cardiac testing  of heart monitor  and referral to spine specialist as directed per PCP.        Objective: See treatment diary below. Pt cont with tx plan this date. Pre tx HR 88 bpm with BP elevation 157/103 mmHg.  Pt stated she was hurrying to appt and with brief rest  x 5min BP dec to 138/89 mmHg. Pt denied any SOB, chest pain or tightness, palpitations pre , during or post tx. Pt care cont as documented. Pt amb with no AD with dec LLE  stride length, dec stance on RLE with trunk flexed and note of R LBP with upright posture. Pt able to complete higher level strength ex for L hip and core  with good tolerance, no pain sx in L hip or LB during or following ex.   Pt amb with improved gait following care and noted about the same R LBP sx.       Assessment: Tolerated treatment well. Patient would benefit from continued PT, skilled care to achieve goals outlined.       Plan: Continue per plan of care.      Precautions: DOS:  L THR ANT approach 25,  DOS 2/15/24 (R hip trochanteric bursectomy with gluteus medius repair and IT band transfer), Lumbar  "surgery, R THR, HTN      Daily Treatment Diary:     Initial Evaluation Date: 03/11/25  Compliance 5/14 5/19 6/2 4/16 4/18 4/23 4/25 4/30 5/5 5/12   Visit Number [id number] 23 14 15 16 17 18 19 20   Re-Eval   Y                Foto Captured                        5/14 5/19 6/2 4/16 4/18 4/23 4/25 4/30 5/5 5/12   Manual             L Hip PROM- ant hip prec 10m - Pc 10m      10 min  pc 10'                STM L  scar, hip / lumbar soft tissue - - - nv -    -    Ther-Ex                    nustep   Bike at home Bike at home - -  St hip 4 ways 2 x 15, TR/HR 30x nc    T abd, flex, leg curl B leg bridge berry  with blue plyoball 3 x 10 B leg bridge berry /blue plyoball 3 x 10 B bridge with berry tband/blue plyoball abd iso 3x 10 B bridge with btb 2 x 10, abd iso B bridge w/btb 2 x 10, abd iso B bridge w/btb + yplyoball at pelvis2 x 10, abd iso B bridge w/berrytb + yplyoball at pelvis2 x 10, abd iso - B bridge with berrytb 3 x 10, B bridge with berry TB 3x10   BLE to U LE bridge with TA set U bridge with blue plyoball 3 x 10 U bridge with blue plyoball 3 x 10 U bridge as cee 3x 10 reps ea, cues for form U  bridge 10x ea U bridge U bridge 3 x 10 ea U bridge 3x10  U bridge yellow plyoball at abdomen 3 x 10 U bridge yellow plyoball at abdomen 3x10   LAQ/SAQ Bkfos 10x10\" Bkfo 10x10\" Bkfos 10x10\" Saq 4 RandL 5# 3 x 15, 5\" Saq r and L 7#, 3 x 15, 5\" Saq R  and L 7# 3 x 10 Saq R and L  8# 3 x 10  Bkfos 10x10\" stretch with ta Bkfos 10x 10\" stretch with ta   QSets -H/L lift overs 6 B 3 x 10 H/l lift overs 4-5 blzpods 3 x 10 MREs SLRs hooklying 2 x 15 Supinelift overs 3 blzpds 3 x 10 ea H/l lift overs 2-3 blz pods 2 x 10 ea H/l lift overs 4 levels of blz pods 3 x 10 H/l lift overs 4 levels of blz  H/l lift overs 3-4 levels of blz 2 x 10f! L hip flexors H/L lift overs 3 L   5 R levels of blz 2x10 hip flexors   ASLRS  - - - H/l march btb 3 x 10 nc nc nc  nc    Clam shells, hip abd with ext knee in s/l 5# weight proximal to knee  3 x 10  6# 3 x " "10 prox to knee 7# 3x 10 4# 3 x 10, 1# str knee abd 3 x 10 4# 4 x 10, 4# fire hydr 4 x 10 1# slr knee abd 4 x 10 5# 3 x 10, 2# at thigh SLR 3 x 10 only 5# 3 x 10, and fire hydrants2# at thigh SLR 3 x 10 only  5# clam, fire hyd and hip abd  3 x 10 5# clam, fire hyd and hip abd (0# for abd) 3x10 ea   Clam shell tband h/l nv -       D/c    Standing fire hydrants mod.  nv - - -  2 x 10 B 2x10 B S/l 2x10 nc    Reviewed and issued HEP POC , tx plan, goals and aerobic ex, use of AD all times                   Neuro Re-Ed                    Mini squats   Amb 1 lap, 1 lap x 2 yellow pllyoball 2m rest b/w weighted amb Amb  1 lap    --    -       SLB floor  SLB Air Ex  4 x  B10-15 RLE 1 UE A nv Front lunge reaches 15x ea cues posture Front lung reaches 15 x ea cues posture  - Floor and Air Ex  3 x 10-20\"//Air ex step up plus LLE 2 x 10 -    Step ups 6\" r and L le 3 x 10 - - Lat amb with no tband 6 laps Lat amb GTB 4 laps Lat amb no tb 4laps Lat amb 6 laps no tb Lat amb gtb 6 laps 6\" R LE 10x, 4\" R LE 15x f! 6\" RLE & LLE  10x ea   Step taps 4\"   - Amb without AD 6 laps- cues longer strides Amb with out AD 8 laps cues posture, stride length Amb without ad wait to door 4 laps 1.5 w/slc w/gati decline R trendelen Amb w/o ad 6 laps fatigue Sofia amb -high 4 laps of 3 -    Weight shifting  F/B, S to S //bars    Long strides 4 laps //bars         Ther-Act              STS  -   --  -         Amb/gait tr  - - Stair amb U HR + SLC 1 lap, non recip  -  1.5 laps fatigue! Cues long strides , m activ 2 laps f! R LB 3 laps with one seated rest break 2* fatigue                  Modalities                    CP L hip HPLB seated 10m HP pre tx 10 m -                                                                                                    "

## 2025-06-04 ENCOUNTER — OFFICE VISIT (OUTPATIENT)
Dept: PHYSICAL THERAPY | Facility: CLINIC | Age: 75
End: 2025-06-04
Attending: ORTHOPAEDIC SURGERY
Payer: MEDICARE

## 2025-06-04 DIAGNOSIS — G89.18 ACUTE POSTOPERATIVE PAIN OF LEFT HIP: ICD-10-CM

## 2025-06-04 DIAGNOSIS — Z96.642 HISTORY OF LEFT HIP REPLACEMENT: ICD-10-CM

## 2025-06-04 DIAGNOSIS — M16.12 OSTEOARTHRITIS OF LEFT HIP, UNSPECIFIED OSTEOARTHRITIS TYPE: Primary | ICD-10-CM

## 2025-06-04 DIAGNOSIS — R26.9 GAIT DIFFICULTY: ICD-10-CM

## 2025-06-04 DIAGNOSIS — M25.552 ACUTE POSTOPERATIVE PAIN OF LEFT HIP: ICD-10-CM

## 2025-06-04 PROCEDURE — 97530 THERAPEUTIC ACTIVITIES: CPT | Performed by: PHYSICAL THERAPIST

## 2025-06-04 PROCEDURE — 97110 THERAPEUTIC EXERCISES: CPT | Performed by: PHYSICAL THERAPIST

## 2025-06-04 NOTE — PROGRESS NOTES
Daily Note     Today's date: 2025  Patient name: RACHNA Pulido  : 1950  MRN: [id number]  Referring provider: Nilo Ramos MD  Dx:   Encounter Diagnosis     ICD-10-CM    1. Osteoarthritis of left hip, unspecified osteoarthritis type  M16.12       2. Acute postoperative pain of left hip  G89.18     M25.552       3. Gait difficulty  R26.9       4. History of left hip replacement  Z96.642                      Subjective: Pt notes L hip is doing very well with mild pain in am getting up and moving after lying on it for sleep which does resolve and does not feel like it limits her in any way, rated 1/10 at worst. Pt notes R LBP and decrease in strength in RLE more functionally limiting her at this time and has appointment  with pain management and spine specialist upcoming for further assist with pain managements and improved weight bearing tolerances. Pt notes she is amb long distance with use of SLC in LUE for assist with posture and gait mechanics.       Objective: See treatment diary below  . Pt notes pre tx and post tx RLBP 2-3/10. Pt amb with no AD with dec stance RLE  and mild trendelenberg gait on RLE. No issues with L Hip. Completed PREs in R S/L for left hip abd strength with 7# today with note of mild fatigue in L hip mm.  Educated pt I feel she is at optimal levels with L hip s/p THR and is at appropriate levels for discharge to [facility] at this time and she verbalized understanding and agreement. She was not in agreement with discharge to [facility] today and wishes to complete other medical appts prior to d/c.     Assessment: Tolerated treatment well. Patient demonstrated fatigue post treatment and would benefit from continued PT. Discharge planning initiated today with plan to d/c in next week.       Plan: Continue per plan of care.      Precautions: DOS:  L THR ANT approach 25,  DOS 2/15/24 (R hip trochanteric bursectomy with gluteus medius repair and IT band transfer), Lumbar surgery, R THR,  "HTN      Daily Treatment Diary:     Initial Evaluation Date: 03/11/25  Compliance 5/14 5/19 6/2 6/4 4/18 4/23 4/25 4/30 5/5 5/12   Visit Number 21 22 23 24 15 16 17 18 19 20   Re-Eval   Y                Foto Captured                        5/14 5/19 6/2 6/4 4/18 4/23 4/25 4/30 5/5 5/12   Manual             L Hip PROM- ant hip prec 10m - Pc 10m -     10 min  pc 10'                STM L  scar, hip / lumbar soft tissue - - - - -    -    Ther-Ex                    nustep   Bike at home Bike at home - -  St hip 4 ways 2 x 15, TR/HR 30x nc    T abd, flex, leg curl B leg bridge berry  with blue plyoball 3 x 10 B leg bridge berry /blue plyoball 3 x 10 B bridge with berry tband/blue plyoball abd iso 3x 10 B bridge with berrytb/blue ball 3x 10, abd iso B bridge w/btb 2 x 10, abd iso B bridge w/btb + yplyoball at pelvis2 x 10, abd iso B bridge w/berrytb + yplyoball at pelvis2 x 10, abd iso - B bridge with berrytb 3 x 10, B bridge with berry TB 3x10   BLE to U LE bridge with TA set U bridge with blue plyoball 3 x 10 U bridge with blue plyoball 3 x 10 U bridge as cee 3x 10 reps ea, cues for form U  bridge 10x 3ea blue plyoball  U bridge U bridge 3 x 10 ea U bridge 3x10  U bridge yellow plyoball at abdomen 3 x 10 U bridge yellow plyoball at abdomen 3x10   LAQ/SAQ Bkfos 10x10\" Bkfo 10x10\" Bkfos 10x10\" Bkfos 10x10\" Saq r and L 7#, 3 x 15, 5\" Saq R  and L 7# 3 x 10 Saq R and L  8# 3 x 10  Bkfos 10x10\" stretch with ta Bkfos 10x 10\" stretch with ta   QSets -H/L lift overs 6 B 3 x 10 H/l lift overs 4-5 blzpods 3 x 10 MREs SLRs hooklying 2 x 15 Supinelift overs wide blzpds 3 x 10 ea/R LE with 2# 1x10 H/l lift overs 2-3 blz pods 2 x 10 ea H/l lift overs 4 levels of blz pods 3 x 10 H/l lift overs 4 levels of blz  H/l lift overs 3-4 levels of blz 2 x 10f! L hip flexors H/L lift overs 3 L   5 R levels of blz 2x10 hip flexors   ASLRS  - - - H/l march btb 3 x 10 nc nc nc  nc    Clam shells, hip abd with ext knee in s/l 5# weight proximal to " "knee  3 x 10  6# 3 x 10 prox to knee 7# 3x 10 7# 3 x 10 4# 4 x 10, 4# fire hydr 4 x 10 1# slr knee abd 4 x 10 5# 3 x 10, 2# at thigh SLR 3 x 10 only 5# 3 x 10, and fire hydrants2# at thigh SLR 3 x 10 only  5# clam, fire hyd and hip abd  3 x 10 5# clam, fire hyd and hip abd (0# for abd) 3x10 ea   Clam shell tband h/l nv -       D/c    Standing fire hydrants mod.  nv - - -  2 x 10 B 2x10 B S/l 2x10 nc    Reviewed and issued HEP POC , tx plan, goals and aerobic ex, use of AD all times                   Neuro Re-Ed                    Mini squats   Amb 1 lap, 1 lap x 2 yellow pllyoball 2m rest b/w weighted amb Amb  1 lap    --    -       SLB floor  SLB Air Ex  4 x  B10-15 RLE 1 UE A nv - Front lung reaches 15 x ea cues posture  - Floor and Air Ex  3 x 10-20\"//Air ex step up plus LLE 2 x 10 -    Step ups 6\" r and L le 3 x 10 - - - Lat amb GTB 4 laps Lat amb no tb 4laps Lat amb 6 laps no tb Lat amb gtb 6 laps 6\" R LE 10x, 4\" R LE 15x f! 6\" RLE & LLE  10x ea   Step taps 4\"   - - Amb with out AD 8 laps cues posture, stride length Amb without ad wait to door 4 laps 1.5 w/slc w/gati decline R trendelen Amb w/o ad 6 laps fatigue Sofia amb -high 4 laps of 3 -    Weight shifting  F/B, S to S //bars             Ther-Act              STS  -   --  -         Amb/gait tr  - - Pt educ d/c planning, goals of care progress demonstrated, tx plan success for L THR, gait and AD use in regard to back sx and dec str R hip pc//HEP  -  1.5 laps fatigue! Cues long strides , m activ 2 laps f! R LB 3 laps with one seated rest break 2* fatigue                  Modalities                    CP L hip HPLB seated 10m HP pre tx 10 m - HP pre tx 10 m                                                                                                     "

## 2025-06-06 ENCOUNTER — APPOINTMENT (OUTPATIENT)
Dept: PHYSICAL THERAPY | Facility: CLINIC | Age: 75
End: 2025-06-06
Attending: ORTHOPAEDIC SURGERY
Payer: MEDICARE

## 2025-07-01 ENCOUNTER — EVALUATION (OUTPATIENT)
Dept: PHYSICAL THERAPY | Facility: CLINIC | Age: 75
End: 2025-07-01
Payer: MEDICARE

## 2025-07-01 VITALS — OXYGEN SATURATION: 98 % | HEART RATE: 86 BPM | DIASTOLIC BLOOD PRESSURE: 66 MMHG | SYSTOLIC BLOOD PRESSURE: 113 MMHG

## 2025-07-01 DIAGNOSIS — M70.60 GREATER TROCHANTERIC BURSITIS, UNSPECIFIED LATERALITY: ICD-10-CM

## 2025-07-01 DIAGNOSIS — G89.29 CHRONIC LOW BACK PAIN WITHOUT SCIATICA, UNSPECIFIED BACK PAIN LATERALITY: Primary | ICD-10-CM

## 2025-07-01 DIAGNOSIS — R26.9 GAIT DIFFICULTY: ICD-10-CM

## 2025-07-01 DIAGNOSIS — M54.50 CHRONIC LOW BACK PAIN WITHOUT SCIATICA, UNSPECIFIED BACK PAIN LATERALITY: Primary | ICD-10-CM

## 2025-07-01 DIAGNOSIS — M53.3 SACROILIAC JOINT PAIN: ICD-10-CM

## 2025-07-01 PROCEDURE — 97112 NEUROMUSCULAR REEDUCATION: CPT | Performed by: PHYSICAL THERAPIST

## 2025-07-01 PROCEDURE — 97162 PT EVAL MOD COMPLEX 30 MIN: CPT | Performed by: PHYSICAL THERAPIST

## 2025-07-01 NOTE — LETTER
2025    No Recipients    Patient: RACHNA Pulido   YOB: 1950   Date of Visit: 2025     Encounter Diagnosis     ICD-10-CM    1. Chronic low back pain without sciatica, unspecified back pain laterality  M54.50     G89.29       2. Sacroiliac joint pain  M53.3       3. Greater trochanteric bursitis, unspecified laterality  M70.60       4. Gait difficulty  R26.9           Dear Dr. Wilkinson Recipients:    Thank you for your recent referral of RACHNA Pulido. Please review the attached evaluation summary from A Porsche's recent visit.     Please verify that you agree with the plan of care by signing the attached order.     If you have any questions or concerns, please do not hesitate to call.     I sincerely appreciate the opportunity to share in the care of one of your patients and hope to have another opportunity to work with you in the near future.       Sincerely,    Charito Nunn, PT      Referring Provider:      I certify that I have read the below Plan of Care and certify the need for these services furnished under this plan of treatment while under my care.                    No Recipients          PT Evaluation     Today's date: 2025  Patient name: RACHNA Pulido  : 1950  MRN: [id number]  Referring provider: No ref. provider found  Dx:   Encounter Diagnosis     ICD-10-CM    1. Chronic low back pain without sciatica, unspecified back pain laterality  M54.50     G89.29       2. Sacroiliac joint pain  M53.3       3. Greater trochanteric bursitis, unspecified laterality  M70.60       4. Gait difficulty  R26.9           Start Time: 1605  Stop Time: 1710  Total time in clinic (min): 65 minutes    Assessment  Impairments: abnormal gait, abnormal or restricted ROM, impaired physical strength, lacks appropriate home exercise program, pain with function, poor posture , poor body mechanics and endurance  Other impairment: 5 x sit to stand  16 seconds with UE use required - no pain but notes dec  strength  Symptom irritability: moderate    Assessment details: 75 year old female with chronic LBP,  B SI joint pain, and B greater trochanteric bursitis, posture dysfunction, thoracic and lumbar extension mobility dysfunction,  and PMH of lumbar fusion L4-5, hx of pain management with recent injection, hx of RTHR and gluteus medius repair, LTHR, scoliosis and chronic LBP.  Pt deficits limiting tolerances for standing, sitting, ambulation and function of homekeeping, exercise tolerances, recreation and ADLs.  Pt goals of care to amb long distances with improved posture and ease for exercise and community outings with out AD, improve ease of sit to stand, improved ability with homekeeping , ADLs with decrease in back pain sx.  Pt was educated in use of AD to improve posture  and gait mechanics and to reduce fall risk - min increase in fall risk with 5 x sit to stand.   Understanding of Dx/Px/POC: good     Prognosis: good  Prognosis details: Pt chronic pain, scoliosis, prior lumbar surgery and hip surgery may limit ability to achieve goals or increase time to achieve goals.     Goals  STG   2-3 weeks   Patient to develop lumbar  AROM extension to 0 degrees   Patient to develop  strength  RA/TA 4/5   Increase R hip abduction and B hip extension strength 1/2 MMT  Increase RLE SLB 15-20 seconds  Decrease pain B SI joint, thoracolumbar spine and hips     Patient to develop  independence with HEP to assist with goal achievements  Pt to develop improved LE flexibility of HS, adductor, calf, and hip flexor musculature BLEs   Pt to develop improved gait mechanics   Decrease lumbar, B hip  soft tissue hypertonicity and pain    LTG  6-8 weeks  Patient to develop lumbar extension ROM 0 to 10 degrees to amb with improved gait mechanics for exercise and community outings         Patient to develop TA/RA  and  R hip abduction, B hip extension strength  4+/5 to 5/5 to  amb 15 minutes without difficulty with heel toe gait and no  A  Patient to develop LE flexibility to WFLs of HS, adductors, hip flexors  and calf mm to return to walking with  good gait mechanics   Patient to develop improved ability for SLB on R LE to 30 seconds for improved gait mechanics  Pt to develop proper body mechanics to lift and carry light to medium weights, to return to homekeeping with out difficulty and reduce likelihood of recurrence      Plan  Patient would benefit from: skilled physical therapy  Planned modality interventions: ultrasound, cryotherapy and thermotherapy: hydrocollator packs    Planned therapy interventions: abdominal trunk stabilization, manual therapy, neuromuscular re-education, therapeutic exercise, therapeutic activities, strengthening, stretching, home exercise program, gait training, functional ROM exercises, body mechanics training, patient/caregiver education and postural training    Frequency: 2x week  Treatment plan discussed with: patient      Subjective Evaluation    History of Present Illness  Mechanism of injury: 74 yo female with chronic LBP aggravation, B SI joint pain, L hip pain and difficulty with walking long distances, difficulty with upright posture. Known hx of scoliosis and spinal fusion. R THR, R gluteus medius hip tear/repair.  Recent L THR earlier this year and completed PT with good response to care. Pt notes lateral hip pain, tenderness to palpation, R lateral and ant hip pain with sitting long durations 3 hours. Daily chronic back pain worse in am, improves with medications, CP and worsens with longer duration of amb when fatigues and trunk moves into flexion more difficulty with upright posture, improves with use of AD of SLC for longer distance amb.  Pt ordered to pain management by her spinal specialist earlier this year and did receive one injection with 24-36 hr relief reported and is  scheduled to have  second injection on 7/18/25 with Dr. Venegas. Pt did seek care for her back and hip pain from  her PCP and  hip surgeon,  Dr. Ramos. Pt was ordered to attend PT for her chronic back pain , posture dysfunction, si joints  and hip trochanteric bursitis. Pt does see PCP in 4 months. She will follow with Dr. Crawford  but  with  Dr. Ramos in one year or sooner if needed.  Pt does having current use of cymbalta for pain managements with dose increasing to 120 mg. Pt notes some tremors in R UE which concern her and has discussed with her PCP and if persists may seek neurology consult.   Patient Goals  Patient goals for therapy: decreased pain and increased motion  Patient goal: , to walk long distances with out AD with out pain, to dec the pain on the L side of hip,  Pain  Current pain ratin  At best pain ratin  At worst pain ratin  Location: left lateral hip 6-10 , back R lumbar to thoracic radiating, L LB,  Quality: discomfort, dull ache and sharp  Relieving factors: ice, change in position, relaxation, rest and medications  Aggravating factors: sitting, walking and standing (groin pain on R with prolonged sitting,  not with other activity only,)  Symptom course: back pain improved, L lateral hip pain worse.    Social Support  Stairs in house: yes   Lives in: multiple-level home  Lives with: spouse    Employment status: not working  Exercise history: Pt ntoes walking for exercise 1/2 to 3/4 of a mile 3 days per week using cane, PT hip exercises, bike 2-3 x per week 5 miles    Treatments  Previous treatment: physical therapy and injection treatment  Current treatment: injection treatment and medication        Objective     Concurrent Complaints  Negative for night pain, disturbed sleep, bladder dysfunction, bowel dysfunction, saddle (S4) numbness, history of cancer, history of trauma and infection    Static Posture     Comments  Pt presents with thoracolumbar scoliosis with lateral shift to L, R knee in flexion during stance. Moderate increase in thoracic kyphosis and moderate forward head posture.  Demonstrates tremor RUE and occasionally RLE  with rest and with intentional movements.    Palpation   Left   Tenderness of the quadratus lumborum.     Right   Hypertonic in the lumbar paraspinals.   Muscle spasm in the erector spinae.   Tenderness of the erector spinae, lumbar paraspinals and quadratus lumborum.     Cervical Spine Comments  Right erector spinae: thoracic and lumbar levedls.     Additional Palpation Details  Tender to palpation of B greater trochanters, proximal ITB, R gluteals    Neurological Testing     Sensation     Lumbar   Left   Intact: light touch    Right   Intact: light touch    Active Range of Motion   Cervical/Thoracic Spine       Thoracic    Flexion:  WFL  Extension:  Restriction level: maximal  Left lateral flexion:  Restriction level: moderate  Right lateral flexion:  Restriction level: moderate  Left rotation:  Restriction level: moderate  Right rotation:  Restriction level: moderate    Lumbar   Flexion:  Restriction level: minimal  Extension:  with pain Restriction level: maximal  Left lateral flexion:  Restriction level: moderate  Right lateral flexion:  Restriction level: moderate  Left rotation:  Restriction level: moderate  Right rotation:  Restriction level: minimal    Joint Play     Hypomobile: T8, T9, T10, T11, T12, L1, L2, L3, L5 and S1     Pain: L5 and S1   L5 comments: fusion    Strength/Myotome Testing     Left Hip   Planes of Motion   Flexion: 4  Extension: 4  Abduction: 5  Adduction: 5    Right Hip   Planes of Motion   Flexion: 4+  Extension: 4  Abduction: 3+  Adduction: 5    Left Knee   Flexion: 4+  Extension: 4+    Right Knee   Flexion: 4+  Extension: 4+    Left Ankle/Foot   Dorsiflexion: 5  Plantar flexion: 5    Right Ankle/Foot   Dorsiflexion: 4  Plantar flexion: 4    Additional Strength Details  SLB RLE 5-7 seconds with + trendelenberg  R, inc pain R LB    SLB LLE 60 seconds with mild increase in R lumbar pain  at 55-60 sec    Ambulation     Observational Gait      Additional Observational Gait Details  Pt amb with out AD with NBOS, decreased in B stride length,  no active trunk rotation or arm swing either UE, flexed posture and cervical  flexion with ambulation. Decrease in stance RLE, flexed knee posturing.     Quality of Movement During Gait   Trunk  Forward lean.   Trunk (Left): Positive left lateral lean over stance limb.     Pelvis    Pelvis (Right): Positive Trendelenburg.     Knee    Knee (Right): Positive increased flexion during stance.     General Comments:      Hip Comments   L hip extension ROM decreased neutral extension, R hip extension decreased to lacks 5-7 deg from neutral extension.  Hip abduction AROM 15-20 deg. Tightness of adductor mm.    HS tightness, quad tightness mod BLEs    Knee Comments  B Knee ROM WFLs with crepitus through mid arc to TKE B with out pain sx- Pt notes hx of knee OA and intermittent knee pain sx. B knee strength 4/5 to 4+/5 flex, ext    Ankle/Foot Comments   Calf tightness B moderate       Flowsheet Rows      Flowsheet Row Most Recent Value   PT/OT G-Codes    Current Score 40   Projected Score 51             Precautions:  HTN, essential tremor, r/o  afib -current cardiac monitor in place, under care PCP  L4/L5 laminectomy  2019, LTHR 2/20/25, RTHR  2021, L4-5 fusion  2022, R gluteus medius/minimus repair and trochanteric bursectomy 2024   Daily Treatment Diary:      Initial Evaluation Date: 07/01/25  Compliance 7/1                     Visit Number 1                    Re-Eval  IE                 MC   Foto Captured Y                           7/1                     Manual                      STM T/L/SI joints                      Passive stretch B hip flexors L Ant THR, HS, adds                      Oscllations L (fusion L4-5) Gr I t/L spine                      Ther-Ex                      TA with hip add, abd isos                      Dead bugs                      B Bridge , U bridge, bridge from sw ball at lower legs                       Open books                      Prone posturing to press up to elbows                      Prone glut sets, knee flex, hip ext                      St opp UE flex, LE ext at wall                      Leg press                      Resisted gait training                                            Neuro Re-Ed                      Lunges with reaches fwd, back, lat Static upirght posture training with m activ cueing pc                     Sit to stands A to no A, resisted 10x  B UE A cues            Squat holds with hip abd iso, add iso -            Slb floor/air ex RLE 3 x 15 B UE A, posture training and m activ            Static stabs RLE             Ther-Act                                                               Modalities                      HP/CP t/l/si             U/S B Si s

## 2025-07-01 NOTE — PROGRESS NOTES
PT Evaluation     Today's date: 2025  Patient name: RACHNA Pulido  : 1950  MRN: [id number]  Referring provider: No ref. provider found  Dx:   Encounter Diagnosis     ICD-10-CM    1. Chronic low back pain without sciatica, unspecified back pain laterality  M54.50     G89.29       2. Sacroiliac joint pain  M53.3       3. Greater trochanteric bursitis, unspecified laterality  M70.60       4. Gait difficulty  R26.9           Start Time: 1605  Stop Time: 1710  Total time in clinic (min): 65 minutes    Assessment  Impairments: abnormal gait, abnormal or restricted ROM, impaired physical strength, lacks appropriate home exercise program, pain with function, poor posture , poor body mechanics and endurance  Other impairment: 5 x sit to stand  16 seconds with UE use required - no pain but notes dec strength  Symptom irritability: moderate    Assessment details: 75 year old female with chronic LBP,  B SI joint pain, and B greater trochanteric bursitis, posture dysfunction, thoracic and lumbar extension mobility dysfunction,  and PMH of lumbar fusion L4-5, hx of pain management with recent injection, hx of RTHR and gluteus medius repair, LTHR, scoliosis and chronic LBP.  Pt deficits limiting tolerances for standing, sitting, ambulation and function of homekeeping, exercise tolerances, recreation and ADLs.  Pt goals of care to amb long distances with improved posture and ease for exercise and community outings with out AD, improve ease of sit to stand, improved ability with homekeeping , ADLs with decrease in back pain sx.  Pt was educated in use of AD to improve posture  and gait mechanics and to reduce fall risk - min increase in fall risk with 5 x sit to stand.   Understanding of Dx/Px/POC: good     Prognosis: good  Prognosis details: Pt chronic pain, scoliosis, prior lumbar surgery and hip surgery may limit ability to achieve goals or increase time to achieve goals.     Goals  STG   2-3 weeks   Patient to  develop lumbar  AROM extension to 0 degrees   Patient to develop  strength  RA/TA 4/5   Increase R hip abduction and B hip extension strength 1/2 MMT  Increase RLE SLB 15-20 seconds  Decrease pain B SI joint, thoracolumbar spine and hips     Patient to develop  independence with HEP to assist with goal achievements  Pt to develop improved LE flexibility of HS, adductor, calf, and hip flexor musculature BLEs   Pt to develop improved gait mechanics   Decrease lumbar, B hip  soft tissue hypertonicity and pain    LTG  6-8 weeks  Patient to develop lumbar extension ROM 0 to 10 degrees to amb with improved gait mechanics for exercise and community outings         Patient to develop TA/RA  and  R hip abduction, B hip extension strength  4+/5 to 5/5 to  amb 15 minutes without difficulty with heel toe gait and no A  Patient to develop LE flexibility to WFLs of HS, adductors, hip flexors  and calf mm to return to walking with  good gait mechanics   Patient to develop improved ability for SLB on R LE to 30 seconds for improved gait mechanics  Pt to develop proper body mechanics to lift and carry light to medium weights, to return to homekeeping with out difficulty and reduce likelihood of recurrence      Plan  Patient would benefit from: skilled physical therapy  Planned modality interventions: ultrasound, cryotherapy and thermotherapy: hydrocollator packs    Planned therapy interventions: abdominal trunk stabilization, manual therapy, neuromuscular re-education, therapeutic exercise, therapeutic activities, strengthening, stretching, home exercise program, gait training, functional ROM exercises, body mechanics training, patient/caregiver education and postural training    Frequency: 2x week  Treatment plan discussed with: patient      Subjective Evaluation    History of Present Illness  Mechanism of injury: 74 yo female with chronic LBP aggravation, B SI joint pain, L hip pain and difficulty with walking long distances,  difficulty with upright posture. Known hx of scoliosis and spinal fusion. R THR, R gluteus medius hip tear/repair.  Recent L THR earlier this year and completed PT with good response to care. Pt notes lateral hip pain, tenderness to palpation, R lateral and ant hip pain with sitting long durations 3 hours. Daily chronic back pain worse in am, improves with medications, CP and worsens with longer duration of amb when fatigues and trunk moves into flexion more difficulty with upright posture, improves with use of AD of SLC for longer distance amb.  Pt ordered to pain management by her spinal specialist earlier this year and did receive one injection with 24-36 hr relief reported and is  scheduled to have  second injection on 25 with Dr. Venegas. Pt did seek care for her back and hip pain from  her PCP and hip surgeon,  Dr. Ramos. Pt was ordered to attend PT for her chronic back pain , posture dysfunction, si joints  and hip trochanteric bursitis. Pt does see PCP in 4 months. She will follow with Dr. Crawford  but  with  Dr. Ramos in one year or sooner if needed.  Pt does having current use of cymbalta for pain managements with dose increasing to 120 mg. Pt notes some tremors in R UE which concern her and has discussed with her PCP and if persists may seek neurology consult.   Patient Goals  Patient goals for therapy: decreased pain and increased motion  Patient goal: , to walk long distances with out AD with out pain, to dec the pain on the L side of hip,  Pain  Current pain ratin  At best pain ratin  At worst pain ratin  Location: left lateral hip 6-7/10 , back R lumbar to thoracic radiating, L LB,  Quality: discomfort, dull ache and sharp  Relieving factors: ice, change in position, relaxation, rest and medications  Aggravating factors: sitting, walking and standing (groin pain on R with prolonged sitting,  not with other activity only,)  Symptom course: back pain improved, L lateral hip pain  worse.    Social Support  Stairs in house: yes   Lives in: multiple-level home  Lives with: spouse    Employment status: not working  Exercise history: Pt ntoes walking for exercise 1/2 to 3/4 of a mile 3 days per week using cane, PT hip exercises, bike 2-3 x per week 5 miles    Treatments  Previous treatment: physical therapy and injection treatment  Current treatment: injection treatment and medication        Objective     Concurrent Complaints  Negative for night pain, disturbed sleep, bladder dysfunction, bowel dysfunction, saddle (S4) numbness, history of cancer, history of trauma and infection    Static Posture     Comments  Pt presents with thoracolumbar scoliosis with lateral shift to L, R knee in flexion during stance. Moderate increase in thoracic kyphosis and moderate forward head posture. Demonstrates tremor RUE and occasionally RLE  with rest and with intentional movements.    Palpation   Left   Tenderness of the quadratus lumborum.     Right   Hypertonic in the lumbar paraspinals.   Muscle spasm in the erector spinae.   Tenderness of the erector spinae, lumbar paraspinals and quadratus lumborum.     Cervical Spine Comments  Right erector spinae: thoracic and lumbar levedls.     Additional Palpation Details  Tender to palpation of B greater trochanters, proximal ITB, R gluteals    Neurological Testing     Sensation     Lumbar   Left   Intact: light touch    Right   Intact: light touch    Active Range of Motion   Cervical/Thoracic Spine       Thoracic    Flexion:  WFL  Extension:  Restriction level: maximal  Left lateral flexion:  Restriction level: moderate  Right lateral flexion:  Restriction level: moderate  Left rotation:  Restriction level: moderate  Right rotation:  Restriction level: moderate    Lumbar   Flexion:  Restriction level: minimal  Extension:  with pain Restriction level: maximal  Left lateral flexion:  Restriction level: moderate  Right lateral flexion:  Restriction level:  moderate  Left rotation:  Restriction level: moderate  Right rotation:  Restriction level: minimal    Joint Play     Hypomobile: T8, T9, T10, T11, T12, L1, L2, L3, L5 and S1     Pain: L5 and S1   L5 comments: fusion    Strength/Myotome Testing     Left Hip   Planes of Motion   Flexion: 4  Extension: 4  Abduction: 5  Adduction: 5    Right Hip   Planes of Motion   Flexion: 4+  Extension: 4  Abduction: 3+  Adduction: 5    Left Knee   Flexion: 4+  Extension: 4+    Right Knee   Flexion: 4+  Extension: 4+    Left Ankle/Foot   Dorsiflexion: 5  Plantar flexion: 5    Right Ankle/Foot   Dorsiflexion: 4  Plantar flexion: 4    Additional Strength Details  SLB RLE 5-7 seconds with + trendelenberg  R, inc pain R LB    SLB LLE 60 seconds with mild increase in R lumbar pain  at 55-60 sec    Ambulation     Observational Gait     Additional Observational Gait Details  Pt amb with out AD with NBOS, decreased in B stride length,  no active trunk rotation or arm swing either UE, flexed posture and cervical  flexion with ambulation. Decrease in stance RLE, flexed knee posturing.     Quality of Movement During Gait   Trunk  Forward lean.   Trunk (Left): Positive left lateral lean over stance limb.     Pelvis    Pelvis (Right): Positive Trendelenburg.     Knee    Knee (Right): Positive increased flexion during stance.     General Comments:      Hip Comments   L hip extension ROM decreased neutral extension, R hip extension decreased to lacks 5-7 deg from neutral extension.  Hip abduction AROM 15-20 deg. Tightness of adductor mm.    HS tightness, quad tightness mod BLEs    Knee Comments  B Knee ROM WFLs with crepitus through mid arc to TKE B with out pain sx- Pt notes hx of knee OA and intermittent knee pain sx. B knee strength 4/5 to 4+/5 flex, ext    Ankle/Foot Comments   Calf tightness B moderate       Flowsheet Rows      Flowsheet Row Most Recent Value   PT/OT G-Codes    Current Score 40   Projected Score 51             Precautions:   HTN, essential tremor, r/o  afib -current cardiac monitor in place, under care PCP  L4/L5 laminectomy  2019, LTHR 2/20/25, RTHR  2021, L4-5 fusion  2022, R gluteus medius/minimus repair and trochanteric bursectomy 2024   Daily Treatment Diary:      Initial Evaluation Date: 07/01/25  Compliance 7/1                     Visit Number 1                    Re-Eval  IE                 MC   Foto Captured Y                           7/1                     Manual                      STM T/L/SI joints                      Passive stretch B hip flexors L Ant THR, HS, adds                      Oscllations L (fusion L4-5) Gr I t/L spine                      Ther-Ex                      TA with hip add, abd isos                      Dead bugs                      B Bridge , U bridge, bridge from sw ball at lower legs                      Open books                      Prone posturing to press up to elbows                      Prone glut sets, knee flex, hip ext                      St opp UE flex, LE ext at wall                      Leg press                      Resisted gait training                                            Neuro Re-Ed                      Lunges with reaches fwd, back, lat Static upirght posture training with m activ cueing pc                     Sit to stands A to no A, resisted 10x  B UE A cues            Squat holds with hip abd iso, add iso -            Slb floor/air ex RLE 3 x 15 B UE A, posture training and m activ            Static stabs RLE             Ther-Act                                                               Modalities                      HP/CP t/l/si             U/S B Si s

## 2025-07-08 ENCOUNTER — OFFICE VISIT (OUTPATIENT)
Dept: PHYSICAL THERAPY | Facility: CLINIC | Age: 75
End: 2025-07-08
Payer: MEDICARE

## 2025-07-08 DIAGNOSIS — M70.60 GREATER TROCHANTERIC BURSITIS, UNSPECIFIED LATERALITY: ICD-10-CM

## 2025-07-08 DIAGNOSIS — R26.9 GAIT DIFFICULTY: ICD-10-CM

## 2025-07-08 DIAGNOSIS — M53.3 SACROILIAC JOINT PAIN: ICD-10-CM

## 2025-07-08 DIAGNOSIS — G89.29 CHRONIC LOW BACK PAIN WITHOUT SCIATICA, UNSPECIFIED BACK PAIN LATERALITY: Primary | ICD-10-CM

## 2025-07-08 DIAGNOSIS — M54.50 CHRONIC LOW BACK PAIN WITHOUT SCIATICA, UNSPECIFIED BACK PAIN LATERALITY: Primary | ICD-10-CM

## 2025-07-08 PROCEDURE — 97140 MANUAL THERAPY 1/> REGIONS: CPT | Performed by: PHYSICAL THERAPIST

## 2025-07-08 PROCEDURE — 97110 THERAPEUTIC EXERCISES: CPT | Performed by: PHYSICAL THERAPIST

## 2025-07-08 NOTE — PROGRESS NOTES
Daily Note     Today's date: 2025  Patient name: RACHNA Pulido  : 1950  MRN: [id number]  Referring provider: No ref. provider found  Dx:   Encounter Diagnosis     ICD-10-CM    1. Chronic low back pain without sciatica, unspecified back pain laterality  M54.50     G89.29       2. Sacroiliac joint pain  M53.3       3. Greater trochanteric bursitis, unspecified laterality  M70.60       4. Gait difficulty  R26.9                      Subjective: Pt notes her am pain sx are improving and relates to use of diclofenac as per Dr. Ramos. Pt notes pain earlier today in R lower lumbar to lower thoracic related to increase in activity homekeeping, baking and packing suitcase. Pt notes she is not having hip pain sx this date but did       Objective: See treatment diary below. Pre tx pain 4-5/10 R lower back.  Post tx pain 0/10.  Initiated tx with CP to R T/L spine seated x 10 min.  Pt noted dec in pain and progressed with core stabilization ex as documented. Finished with STM to R T/L soft tissue. Pt completed standing posture training, encouraged to use SLC to assist with posture and gait mechanics as demonstrates dec stance on RLE , flexed lumbar spine and dec B stride length with out AD.       Assessment: Tolerated treatment well. Addressed pain and muscle hypertonicity with STM, CP. Patient would benefit from continued PT to achieve goals outlined      Plan: Continue per plan of care.      Precautions:  HTN, essential tremor, r/o  afib -current cardiac monitor in place, under care PCP  L4/L5 laminectomy  , LTHR 25, RTHR  , L4-5 fusion  , R gluteus medius/minimus repair and trochanteric bursectomy    Daily Treatment Diary:      Initial Evaluation Date: 25  Compliance                    Visit Number 1 2                   Re-Eval  IE                 MC   Foto Captured Y                                              Manual                      STM T/L/SI joints   PC 15 m R  "T/L/S                   Passive stretch B hip flexors L Ant THR, HS, adds                      Oscllations L (fusion L4-5) Gr I t/L spine                      Ther-Ex                      TA with hip add, abd isos                      Dead bugs   20x                   B Bridge , U bridge, bridge from sw ball at lower legs  nc                   Open books                      Prone posturing to press up to elbows                      Prone glut sets, knee flex, hip ext   h/l RA/TA activ ball presses  10x5\" 3 dir                   St opp UE flex, LE ext at wall                      Leg press                      Resisted gait training   LE taps from table top 2 x 15 , cues form                   DKC curl ups rswball   15x                   Neuro Re-Ed                      Lunges with reaches fwd, back, lat Static upirght posture training with m activ cueing pc  Static upirght posture training with m activ cueing pc                   Sit to stands A to no A, resisted 10x  B UE A cues            Squat holds with hip abd iso, add iso -            Slb floor/air ex RLE 3 x 15 B UE A, posture training and m activ            Static stabs RLE             Ther-Act                                                               Modalities                      HP/CP t/l/si  CP seated T t/l 10 m            U/S B Si s                                                 "

## 2025-07-15 ENCOUNTER — OFFICE VISIT (OUTPATIENT)
Dept: PHYSICAL THERAPY | Facility: CLINIC | Age: 75
End: 2025-07-15
Payer: MEDICARE

## 2025-07-15 DIAGNOSIS — R26.9 GAIT DIFFICULTY: ICD-10-CM

## 2025-07-15 DIAGNOSIS — G89.29 CHRONIC LOW BACK PAIN WITHOUT SCIATICA, UNSPECIFIED BACK PAIN LATERALITY: Primary | ICD-10-CM

## 2025-07-15 DIAGNOSIS — M54.50 CHRONIC LOW BACK PAIN WITHOUT SCIATICA, UNSPECIFIED BACK PAIN LATERALITY: Primary | ICD-10-CM

## 2025-07-15 DIAGNOSIS — M53.3 SACROILIAC JOINT PAIN: ICD-10-CM

## 2025-07-15 DIAGNOSIS — M70.60 GREATER TROCHANTERIC BURSITIS, UNSPECIFIED LATERALITY: ICD-10-CM

## 2025-07-15 PROCEDURE — 97110 THERAPEUTIC EXERCISES: CPT

## 2025-07-15 PROCEDURE — 97140 MANUAL THERAPY 1/> REGIONS: CPT

## 2025-07-16 ENCOUNTER — HOSPITAL ENCOUNTER (OUTPATIENT)
Dept: NON INVASIVE DIAGNOSTICS | Facility: HOSPITAL | Age: 75
Discharge: HOME | End: 2025-07-16
Attending: INTERNAL MEDICINE
Payer: MEDICARE

## 2025-07-16 VITALS
HEIGHT: 66 IN | SYSTOLIC BLOOD PRESSURE: 120 MMHG | BODY MASS INDEX: 27.32 KG/M2 | HEART RATE: 80 BPM | WEIGHT: 170 LBS | DIASTOLIC BLOOD PRESSURE: 60 MMHG

## 2025-07-16 DIAGNOSIS — I10 ESSENTIAL (PRIMARY) HYPERTENSION: ICD-10-CM

## 2025-07-16 DIAGNOSIS — Z82.49 FAMILY HX OF ISCHEM HEART DIS AND OTH DIS OF THE CIRC SYS: ICD-10-CM

## 2025-07-16 LAB
AORTIC ROOT: 2.9 CM
ASCENDING AORTA: 3.1 CM
BSA FOR ECHO PROCEDURE: 1.87 M2
DOP CALC MV VTI: 38.47 CM
FRACTIONAL SHORTENING: 31 (ref 28–44)
INTERVENTRICULAR SEPTUM IN DIASTOLE (PARASTERNAL SHORT AXIS VIEW): 1 CM
INTERVENTRICULAR SEPTUM: 1 CM (ref 0.6–1.1)
LEFT ATRIUM SIZE: 4.3 CM
LEFT INTERNAL DIMENSION IN SYSTOLE: 2.7 CM (ref 2.1–4)
LEFT VENTRICLE DIASTOLIC VOLUME (MOD BIPLANE): 52 ML
LEFT VENTRICLE DIASTOLIC VOLUME INDEX (MOD BIPLANE): 27.8 ML/M2
LEFT VENTRICLE SYSTOLIC VOLUME (MOD BIPLANE): 21 ML
LEFT VENTRICLE SYSTOLIC VOLUME INDEX (MOD BIPLANE): 11.2 ML/M2
LEFT VENTRICULAR INTERNAL DIMENSION IN DIASTOLE: 3.9 CM (ref 3.5–6)
LEFT VENTRICULAR POSTERIOR WALL IN END DIASTOLE: 1 CM
LEFT VENTRICULAR STROKE VOLUME: 41 ML
LV EF BIPLANE MOD: 61 %
LV EF US.2D.A4C+ESTIMATED: 67 %
LVSV (TEICH): 41 ML
MV MEAN GRADIENT: 6 MMHG
MV PEAK GRADIENT: 11 MMHG
MV STENOSIS PRESSURE HALF TIME: 65 MS
MV VALVE AREA P 1/2 METHOD: 3.38
SL CV PED ECHO LEFT VENTRICLE DIASTOLIC VOLUME (MOD BIPLANE) 2D: 67 ML
SL CV PED ECHO LEFT VENTRICLE SYSTOLIC VOLUME (MOD BIPLANE) 2D: 26 ML
TR MAX PG: 30 MMHG
TR PEAK VELOCITY: 2.7 M/S
TRICUSPID ANNULAR PLANE SYSTOLIC EXCURSION: 2.2 CM
TRICUSPID VALVE PEAK REGURGITATION VELOCITY: 2.74 M/S

## 2025-07-16 PROCEDURE — 93306 TTE W/DOPPLER COMPLETE: CPT

## 2025-07-16 PROCEDURE — 93306 TTE W/DOPPLER COMPLETE: CPT | Performed by: INTERNAL MEDICINE

## 2025-07-17 ENCOUNTER — OFFICE VISIT (OUTPATIENT)
Dept: PHYSICAL THERAPY | Facility: CLINIC | Age: 75
End: 2025-07-17
Payer: MEDICARE

## 2025-07-17 DIAGNOSIS — M53.3 SACROILIAC JOINT PAIN: ICD-10-CM

## 2025-07-17 DIAGNOSIS — M70.60 GREATER TROCHANTERIC BURSITIS, UNSPECIFIED LATERALITY: ICD-10-CM

## 2025-07-17 DIAGNOSIS — M54.50 CHRONIC LOW BACK PAIN WITHOUT SCIATICA, UNSPECIFIED BACK PAIN LATERALITY: Primary | ICD-10-CM

## 2025-07-17 DIAGNOSIS — R26.9 GAIT DIFFICULTY: ICD-10-CM

## 2025-07-17 DIAGNOSIS — G89.29 CHRONIC LOW BACK PAIN WITHOUT SCIATICA, UNSPECIFIED BACK PAIN LATERALITY: Primary | ICD-10-CM

## 2025-07-17 PROCEDURE — 97110 THERAPEUTIC EXERCISES: CPT | Performed by: PHYSICAL THERAPIST

## 2025-07-17 PROCEDURE — 97140 MANUAL THERAPY 1/> REGIONS: CPT | Performed by: PHYSICAL THERAPIST

## 2025-07-17 PROCEDURE — 97112 NEUROMUSCULAR REEDUCATION: CPT | Performed by: PHYSICAL THERAPIST

## 2025-07-17 NOTE — PROGRESS NOTES
Daily Note     Today's date: 2025  Patient name: RACHNA Pulido  : 1950  MRN: [id number]  Referring provider: Jovana Burks CR*  Dx:   Encounter Diagnosis     ICD-10-CM    1. Chronic low back pain without sciatica, unspecified back pain laterality  M54.50     G89.29       2. Sacroiliac joint pain  M53.3       3. Greater trochanteric bursitis, unspecified laterality  M70.60       4. Gait difficulty  R26.9                      Subjective: Pt notes having one day at daughters home  where she had no back pain 7:30 am to 12 noon and relates to harder mattress.  Pt notes pain fluctuates in R LLB, R and L hip.       Objective: See treatment diary below. Pre tx pain R lateral hip 3/10, L hip 0/10, Lower back 0/10 but states this am pre tylenol 5-6/10. Pt progressed with care plan today and tolerated session well with resolve of pain reported. Pt did require moderate cueing for form, posture and muscle activation with standing  neuroreed ex.       Assessment: Tolerated treatment well. Patient would benefit from continued PT, skilled care to achieve goals.       Plan: Continue per plan of care.      Precautions:  HTN, essential tremor, r/o  afib -current cardiac monitor in place, under care PCP  L4/L5 laminectomy  , LTHR 25, RTHR  , L4-5 fusion  , R gluteus medius/minimus repair and trochanteric bursectomy    Daily Treatment Diary:      Initial Evaluation Date: 25  Compliance 7/1  7/8  7/15  7/17               Visit Number 1 2  3  4               Re-Eval  IE                 MC   Foto Captured Y     Y                      7/1  7/8  7/15  7/17               Manual                      STM T/L/SI joints   PC 15 m R T/L/S  15' LT  15min  PC               Passive stretch B hip flexors L Ant THR, HS, adds                      Oscllations L (fusion L4-5) Gr I t/L spine                      Ther-Ex                      TA with hip add, abd isos       nustep 10 m L4               Dead  "bugs   20x  20x   30x               B Bridge , U bridge, bridge from sw ball at lower legs  nc    Bridge from sw ball at calves 20x                Open books                      Prone posturing to press up to elbows                      Prone glut sets, knee flex, hip ext   h/l RA/TA activ ball presses  10x5\" 3 dir  H/L RA/TA activ ball press 10x5\" 3 dir  H/L RA/TA activ ball press 10x5\", 3 dir               St opp UE flex, LE ext at wall                      Leg press                      Resisted gait training   LE taps from table top 2 x 15 , cues form  LE taps from table top 2x15   LE taps from table top 2 x 15               DKC curl ups rswball   15x  20x  20x               Neuro Re-Ed                      Lunges with reaches fwd, back, lat Static upirght posture training with m activ cueing pc  Static upirght posture training with m activ cueing pc  static upright posture training with m active cueing   sw ball press st m activ  and posture cueing 15x5\"//multifid iso 15x5\"               Sit to stands A to no A, resisted 10x  B UE A cues            Squat holds with hip abd iso, add iso -            Slb floor/air ex RLE 3 x 15 B UE A, posture training and m activ            Static stabs RLE             Ther-Act                                                               Modalities                      HP/CP t/l/si  CP seated T t/l 10 m            U/S B Si s                                                     "

## 2025-07-18 ENCOUNTER — APPOINTMENT (OUTPATIENT)
Dept: PHYSICAL THERAPY | Facility: CLINIC | Age: 75
End: 2025-07-18
Payer: MEDICARE

## 2025-07-20 ENCOUNTER — OFFICE VISIT (OUTPATIENT)
Dept: URGENT CARE | Facility: CLINIC | Age: 75
End: 2025-07-20
Payer: MEDICARE

## 2025-07-20 VITALS
HEIGHT: 66 IN | SYSTOLIC BLOOD PRESSURE: 128 MMHG | WEIGHT: 175 LBS | BODY MASS INDEX: 28.12 KG/M2 | RESPIRATION RATE: 16 BRPM | HEART RATE: 97 BPM | TEMPERATURE: 98 F | DIASTOLIC BLOOD PRESSURE: 78 MMHG | OXYGEN SATURATION: 100 %

## 2025-07-20 DIAGNOSIS — Z23 ENCOUNTER FOR IMMUNIZATION: ICD-10-CM

## 2025-07-20 DIAGNOSIS — S81.811A LACERATION OF RIGHT LOWER LEG, INITIAL ENCOUNTER: Primary | ICD-10-CM

## 2025-07-20 PROCEDURE — G0463 HOSPITAL OUTPT CLINIC VISIT: HCPCS

## 2025-07-20 PROCEDURE — 90715 TDAP VACCINE 7 YRS/> IM: CPT

## 2025-07-20 PROCEDURE — 99203 OFFICE O/P NEW LOW 30 MIN: CPT

## 2025-07-20 PROCEDURE — 12002 RPR S/N/AX/GEN/TRNK2.6-7.5CM: CPT

## 2025-07-20 RX ORDER — CEPHALEXIN 500 MG/1
500 CAPSULE ORAL EVERY 12 HOURS SCHEDULED
Qty: 14 CAPSULE | Refills: 0 | Status: SHIPPED | OUTPATIENT
Start: 2025-07-20 | End: 2025-07-27

## 2025-07-20 NOTE — PROGRESS NOTES
[facility]  Name: RACHNA Pulido      : 1950      MRN: [id number]  Encounter Provider: BRAVO [place]  Encounter Date: 2025   Encounter department: [facility]  :  Assessment & Plan  Laceration of right lower leg, initial encounter  5 cm crescent shaped laceration to the right shin closed with glue and Steri-Strips.  Tdap updated.  Will start patient on Keflex given the dirty nature of the injury.   Orders:    Tdap Vaccine greater than or equal to 6yo    cephalexin (KEFLEX) 500 mg capsule; Take 1 capsule (500 mg total) by mouth every 12 (twelve) hours for 7 days    Encounter for immunization    Orders:    Tdap Vaccine greater than or equal to 6yo        Patient Instructions  1. Keep initial dressing on and dry for approx. 24 hours.  If bleeds through, add additional dressing over initial dressing; elevate wound site if able and apply cold compress to lessen bleeding.      2.  After approx. 24 hours may remove initial dressing.   If dressing is stuck to wound, you may moisten dressing and gently work it off.  Avoid ripping dressing off.    3.  Avoid submersing wound in water (tub, sink, pool, ocean, lake).  May shower and get area wet after 24 hours.    4. Gently wash area with plain water and small amount of soap.  Do not use peroxide for cleansing.  After cleaning, pat dry and apply small amount of topical antibiotic ointment over wound site and clean dressing.    5. Change dressing daily (also change dressing if it becomes soiled or wet.)    6. May leave wound open to air after 48 hours if not longer oozing and when relaxing at home.    7. If you develop any pain, swelling at wound site, you may try to elevate site if possble and use cold compresses over area.  If concerned about possible infection (see below), seek further medical evaluation.    8. Call your PCP today or tomorrow (or as soon as possible) to make appoint to have sutures removed in 9-10  days.    9. Watch for signs of infection which would include increased pain at wound site, swelling, redness and purulent drainage.  Seek further evaluation as soon as possible.    Follow up with PCP in 3-5 days.  Proceed to  ER if symptoms worsen.    If tests are performed, our office will contact you with results only if changes need to made to the care plan discussed with you at the visit. You can [facility]    Chief Complaint:   Chief Complaint   Patient presents with    laceration     Cut right lower leg on car door this morning  Last tetanus 10/2013     History of Present Illness   Patient is a 75-year-old female presents to the office today for a laceration to the right lower leg.  She states this morning around 1130 they were on their way to [facility] when she got excited open the car door too fast causing the bottom of the door to strike against her leg sustaining a laceration.  She was able to go for [facility] and presented here afterwards.  She does not take any blood thinners.  She is unsure when her last tetanus shot was.      History obtained from: patient    Review of Systems   Skin:  Positive for wound.   All other systems reviewed and are negative.    Past Medical History   Past Medical History[1]  Past Surgical History[2]  Family History[3]  she reports that she has never smoked. She has never used smokeless tobacco. She reports current alcohol use. She reports that she does not use drugs.  Current Outpatient Medications   Medication Instructions    amLODIPine (NORVASC) 5 mg, Daily    aspirin (ECOTRIN LOW STRENGTH) 81 mg, 2 times daily    BIOTIN PO Daily    Calcium-Phosphorus-Vitamin D (CALCIUM GUMMIES PO) Take by mouth    cephalexin (KEFLEX) 500 mg, Oral, Every 12 hours scheduled    ergocalciferol (VITAMIN D2) 50,000 Units    famotidine (PEPCID) 40 MG tablet No dose, route, or frequency recorded.    Latanoprost 0.005 % EMUL No dose, route, or frequency recorded.     "levothyroxine 100 mcg, Daily    loratadine (CLARITIN) 10 mg, Daily    Multiple Vitamin (MULTIVITAMIN PO) 1 tablet, Oral, Daily    Multiple Vitamin (multivitamin) capsule 1 capsule, Daily    naproxen (NAPROSYN) 500 mg, Oral, 2 times daily with meals    Omega-3 Fatty Acids (FISH OIL ADULT GUMMIES PO) Take by mouth    pantoprazole (PROTONIX) 40 mg, Daily    pravastatin (PRAVACHOL) 20 mg, Oral, Daily    traMADol (ULTRAM) 50 mg, Oral, Every 8 hours PRN   Allergies[4]     Objective   /78   Pulse 97   Temp 98 °F (36.7 °C)   Resp 16   Ht 5' 6\" (1.676 m)   Wt 79.4 kg (175 lb)   SpO2 100%   BMI 28.25 kg/m²      Physical Exam  Vitals and nursing note reviewed.   Constitutional:       Appearance: Normal appearance. She is normal weight.     Cardiovascular:      Rate and Rhythm: Normal rate and regular rhythm.      Pulses: Normal pulses.   Pulmonary:      Effort: Pulmonary effort is normal.     Skin:     General: Skin is warm.      Capillary Refill: Capillary refill takes less than 2 seconds.      Findings: Laceration present.          Neurological:      Mental Status: She is alert.         Universal Protocol:  procedure performed by consultantConsent: Verbal consent obtained  Risks and benefits: risks, benefits and alternatives were discussed  Consent given by: patient  Time out: Immediately prior to procedure a \"time out\" was called to verify the correct patient, procedure, equipment, support staff and site/side marked as required.  Patient understanding: patient states understanding of the procedure being performed  Patient identity confirmed: verbally with patient  Laceration repair    Date/Time: 7/20/2025 1:00 PM    Performed by: BRAVO Montana  Authorized by: BRAVO [place]  Body area: lower extremity  Location details: right lower leg  Laceration length: 5 cm  Foreign bodies: no foreign bodies  Tendon involvement: none  Nerve involvement: none  Vascular damage: no  Anesthesia method: " "none.    Sedation:  Patient sedated: no        Procedure Details:  Preparation: Patient was prepped and draped in the usual sterile fashion.  Irrigation solution: saline  Irrigation method: syringe  Amount of cleaning: standard  Skin closure: glue and Steri-Strips  Approximation: close  Approximation difficulty: simple  Dressing: non-adhesive packing strip and gauze roll  Patient tolerance: patient tolerated the procedure well with no immediate complications            Portions of the record may have been created with voice recognition software.  Occasional wrong word or \"sound a like\" substitutions may have occurred due to the inherent limitations of voice recognition software.  Read the chart carefully and recognize, using context, where substitutions have occurred.         [1]   Past Medical History:  Diagnosis Date    Chronic pain disorder     Disease of thyroid gland     GERD (gastroesophageal reflux disease)     Hyperlipidemia     Hypertension     Seasonal allergies    [2]   Past Surgical History:  Procedure Laterality Date    BACK SURGERY  2019    laminectomy    COLONOSCOPY      FL GUIDED NEEDLE PLAC BX/ASP/INJ  1/12/2024    JOINT REPLACEMENT Right     RTHR    NERVE BLOCK Right 8/26/2021    Procedure: BLOCK MEDIAL BRANCH L3, L4, L5;  Surgeon: Vle Hou MD;  Location: [facility];  Service: Pain Management     NERVE BLOCK Right 9/21/2021    Procedure: BLOCK MEDIAL BRANCH Right L3, L4, L5 #2;  Surgeon: Vel Hou MD;  Location: [facility];  Service: Pain Management     RADIOFREQUENCY ABLATION Right 10/14/2021    Procedure: L3 L4 L5 RADIO FREQUENCY ABLATION;  Surgeon: Vel Hou MD;  Location: [facility];  Service: Pain Management    [3]   Family History  Problem Relation Name Age of Onset    No Known Problems Mother      No Known Problems Father     [4]   Allergies  Allergen Reactions    Amoxicillin Abdominal Pain, Other (See Comments), Diarrhea, GI Intolerance and Nausea Only    Azithromycin " Abdominal Pain and Other (See Comments)

## 2025-07-22 ENCOUNTER — OFFICE VISIT (OUTPATIENT)
Dept: PHYSICAL THERAPY | Facility: CLINIC | Age: 75
End: 2025-07-22
Payer: MEDICARE

## 2025-07-22 DIAGNOSIS — G89.29 CHRONIC LOW BACK PAIN WITHOUT SCIATICA, UNSPECIFIED BACK PAIN LATERALITY: Primary | ICD-10-CM

## 2025-07-22 DIAGNOSIS — R26.9 GAIT DIFFICULTY: ICD-10-CM

## 2025-07-22 DIAGNOSIS — M70.60 GREATER TROCHANTERIC BURSITIS, UNSPECIFIED LATERALITY: ICD-10-CM

## 2025-07-22 DIAGNOSIS — M53.3 SACROILIAC JOINT PAIN: ICD-10-CM

## 2025-07-22 DIAGNOSIS — M54.50 CHRONIC LOW BACK PAIN WITHOUT SCIATICA, UNSPECIFIED BACK PAIN LATERALITY: Primary | ICD-10-CM

## 2025-07-22 PROCEDURE — 97110 THERAPEUTIC EXERCISES: CPT | Performed by: PHYSICAL THERAPIST

## 2025-07-22 PROCEDURE — 97140 MANUAL THERAPY 1/> REGIONS: CPT | Performed by: PHYSICAL THERAPIST

## 2025-07-22 PROCEDURE — 97112 NEUROMUSCULAR REEDUCATION: CPT | Performed by: PHYSICAL THERAPIST

## 2025-07-22 NOTE — PROGRESS NOTES
Daily Note     Today's date: 2025  Patient name: RACHNA Pulido  : 1950  MRN: [id number]  Referring provider: No ref. provider found  Dx:   Encounter Diagnosis     ICD-10-CM    1. Chronic low back pain without sciatica, unspecified back pain laterality  M54.50     G89.29       2. Sacroiliac joint pain  M53.3       3. Greater trochanteric bursitis, unspecified laterality  M70.60       4. Gait difficulty  R26.9                      Subjective: Pt notes she was very active with family  yesterday and used her cane all day. Pt noted no hip pain but had R LBP this am 8/10, 6/10 since taking tylenol.       Objective: See treatment diary below. Pt tx progressed to prone post chain strength ex . Pt notes it has been years since laid on stomach. Felt need to add pillow under abdomen for comfort with position. Pt required cueing for form and technique. Progressed with seated core stab ex secondary to fatigue with standing ex. Completed amb heel toe gait with long strides in parallel bars. Pt noted fatigue with less pain sx post tx. HEP progressed with return demonstration  completed.       Assessment: Tolerated treatment well. Patient would benefit from continued PT, skilled care to achieve goals outlined.       Plan: Continue per plan of care.      Precautions:  HTN, essential tremor, r/o  afib, under care PCP  L4/L5 laminectomy  , LTHR 25, RTHR  , L4-5 fusion  , R gluteus medius/minimus repair and trochanteric bursectomy    Daily Treatment Diary:      Initial Evaluation Date: 25  Compliance 7/1  7/8  7/15  7/17  7/22             Visit Number 1 2  3  4  5             Re-Eval  IE                    Foto Captured Y     Y                      7/1  7/8  7/15  7/17  7/22             Manual                      STM T/L/SI joints   PC 15 m R T/L/S  15' LT  15min  PC  prone 15 m pc             Passive stretch B hip flexors L Ant THR, HS, adds                      Oscllations L (fusion L4-5)  "Gr I t/L spine         pc no L4-5 fusion             Ther-Ex                      TA with hip add, abd isos       nustep 10 m L4  pt deferred             Dead bugs   20x  20x   30x  -             B Bridge , U bridge, bridge from sw ball at lower legs  nc    Bridge from sw ball at calves 20x   -             Open books                      Prone posturing to press up to elbows                      Prone glut sets, knee flex, hip ext   h/l RA/TA activ ball presses  10x5\" 3 dir  H/L RA/TA activ ball press 10x5\" 3 dir  H/L RA/TA activ ball press 10x5\", 3 dir  prone gset 15-x5\", leg curl 2 x 10, hip ext 2 x 10, hip squeeze 2 x 10             St opp UE flex, LE ext at wall                      Leg press                      Resisted gait training   LE taps from table top 2 x 15 , cues form  LE taps from table top 2x15   LE taps from table top 2 x 15               DKC curl ups rswball   15x  20x  20x               Neuro Re-Ed                      Lunges with reaches fwd, back, lat Static upirght posture training with m activ cueing pc  Static upirght posture training with m activ cueing pc  static upright posture training with m active cueing   sw ball press st m activ  and posture cueing 15x5\"//multifid iso 15x5\"  stand sw ball press 3 dir 10x 5\" ea             Sit to stands A to no A, resisted 10x  B UE A cues    10x B UE A        Squat holds with hip abd iso, add iso -    Tband pulls flex bias seated 20x gtb        Slb floor/air ex RLE 3 x 15 B UE A, posture training and m activ    Amb //bars long  strides 4 laps f!. UEA         Static stabs RLE             Ther-Act                                                               Modalities                      HP/CP t/l/si  CP seated T t/l 10 m    HP pre tx seated 10 m t/l spine        U/S B Si s                                                       "

## 2025-07-23 NOTE — HOME EXERCISE EDUCATION
Program_ID:[id number]   Access Code: [email or web address]  URL: [email or web address]  Date: 07-  Prepared By: Charito Nunn    Program Notes      Exercises      - Prone Gluteal Sets - 2 x daily - 7 x weekly - 2 sets - 10 reps - 5 hold      - Prone Heel Squeeze - 2 x daily - 7 x weekly - 2 sets - 10 reps - 5 hold      - Prone Knee Flexion AROM - 2 x daily - 7 x weekly - 2 sets - 10 reps - 5 hold      - Prone Hip Extension - 2 x daily - 7 x weekly - 2 sets - 10 reps - 2 hold

## 2025-07-25 ENCOUNTER — OFFICE VISIT (OUTPATIENT)
Dept: PHYSICAL THERAPY | Facility: CLINIC | Age: 75
End: 2025-07-25
Payer: MEDICARE

## 2025-07-25 DIAGNOSIS — R26.9 GAIT DIFFICULTY: ICD-10-CM

## 2025-07-25 DIAGNOSIS — M70.60 GREATER TROCHANTERIC BURSITIS, UNSPECIFIED LATERALITY: ICD-10-CM

## 2025-07-25 DIAGNOSIS — M53.3 SACROILIAC JOINT PAIN: ICD-10-CM

## 2025-07-25 DIAGNOSIS — G89.29 CHRONIC LOW BACK PAIN WITHOUT SCIATICA, UNSPECIFIED BACK PAIN LATERALITY: Primary | ICD-10-CM

## 2025-07-25 DIAGNOSIS — M54.50 CHRONIC LOW BACK PAIN WITHOUT SCIATICA, UNSPECIFIED BACK PAIN LATERALITY: Primary | ICD-10-CM

## 2025-07-25 PROCEDURE — 97112 NEUROMUSCULAR REEDUCATION: CPT | Performed by: PHYSICAL THERAPIST

## 2025-07-25 PROCEDURE — 97110 THERAPEUTIC EXERCISES: CPT | Performed by: PHYSICAL THERAPIST

## 2025-07-25 PROCEDURE — 97140 MANUAL THERAPY 1/> REGIONS: CPT | Performed by: PHYSICAL THERAPIST

## 2025-07-25 NOTE — PROGRESS NOTES
Daily Note     Today's date: 2025  Patient name: RACHNA Pulido  : 1950  MRN: [id number]  Referring provider: No ref. provider found  Dx:   Encounter Diagnosis     ICD-10-CM    1. Chronic low back pain without sciatica, unspecified back pain laterality  M54.50     G89.29       2. Sacroiliac joint pain  M53.3       3. Greater trochanteric bursitis, unspecified laterality  M70.60       4. Gait difficulty  R26.9                      Subjective: Pt noted good tolerance to last PT. Notes having fatigue and a lot of pain in back standing to cook and had to sit to dec her pain. Notes pain with increased this am.       Objective: See treatment diary below. Pt cont with tx plan to further assist with pain, strength and mobility of her hips and T/L spine.       Assessment: Tolerated treatment well. Patient demonstrated fatigue post treatment and would benefit from continued PT, skilled care to achieve goals.       Plan: Continue per plan of care.      Precautions:  HTN, essential tremor, r/o  afib, under care PCP  L4/L5 laminectomy  , LTHR 25, RTHR  , L4-5 fusion  , R gluteus medius/minimus repair and trochanteric bursectomy    Daily Treatment Diary:      Initial Evaluation Date: 25  Compliance 7/1  7/8  7/15  7/17  7/22  7/25           Visit Number 1 2  3  4  5  6           Re-Eval  IE                    Foto Captured Y     Y                      7/1  7/8  7/15  7/17  7/22  7/25           Manual                      STM T/L/SI joints   PC 15 m R T/L/S  15' LT  15min  PC  prone 15 m pc  prone 15m pc           Passive stretch B hip flexors L Ant THR, HS, adds                      Oscllations L (fusion L4-5) Gr I t/L spine         pc no L4-5 fusion  pc no L4-5 fusion           Ther-Ex                      TA with hip add, abd isos       nustep 10 m L4  pt deferred Nustep 10 m L4           Dead bugs   20x  20x   30x  -  -           B Bridge , U bridge, bridge from sw ball at lower legs   "nc    Bridge from sw ball at calves 20x   -  -           Open books                      Prone posturing to press up to elbows                      Prone glut sets, knee flex, hip ext   h/l RA/TA activ ball presses  10x5\" 3 dir  H/L RA/TA activ ball press 10x5\" 3 dir  H/L RA/TA activ ball press 10x5\", 3 dir  prone gset 15-x5\", leg curl 2 x 10, hip ext 2 x 10, hip squeeze 2 x 10  prone gset 15x5\", leg curl gtb 2 x 10, hip ext with knee ext and flexed 2 x 10 ea           St opp UE flex, LE ext at wall                      Leg press                      Resisted gait training   LE taps from table top 2 x 15 , cues form  LE taps from table top 2x15   LE taps from table top 2 x 15               DKC curl ups rswball   15x  20x  20x               Neuro Re-Ed                      Lunges with reaches fwd, back, lat Static upirght posture training with m activ cueing pc  Static upirght posture training with m activ cueing pc  static upright posture training with m active cueing   sw ball press st m activ  and posture cueing 15x5\"//multifid iso 15x5\"  stand sw ball press 3 dir 10x 5\" ea  tand sw ball press 3 dir 3\" x 10 ea//posture cueing           Sit to stands A to no A, resisted 10x  B UE A cues    10x B UE A Nc f!       Squat holds with hip abd iso, add iso -    Tband pulls flex bias seated 20x gtb Band pulls flex and ext bias standing gtb  20x ea       Slb floor/air ex RLE 3 x 15 B UE A, posture training and m activ    Amb //bars long  strides 4 laps f!. UEA  Amb //bars long strides 4 laps  UE A       Static stabs RLE             Ther-Act                                                               Modalities                      HP/CP t/l/si  CP seated T t/l 10 m    HP pre tx seated 10 m t/l spine -       U/S B Si s                                                         "

## 2025-08-05 ENCOUNTER — OFFICE VISIT (OUTPATIENT)
Dept: PHYSICAL THERAPY | Facility: CLINIC | Age: 75
End: 2025-08-05
Attending: ORTHOPAEDIC SURGERY
Payer: MEDICARE

## 2025-08-05 DIAGNOSIS — M53.3 SACROILIAC JOINT PAIN: ICD-10-CM

## 2025-08-05 DIAGNOSIS — G89.29 CHRONIC LOW BACK PAIN WITHOUT SCIATICA, UNSPECIFIED BACK PAIN LATERALITY: Primary | ICD-10-CM

## 2025-08-05 DIAGNOSIS — R26.9 GAIT DIFFICULTY: ICD-10-CM

## 2025-08-05 DIAGNOSIS — M54.50 CHRONIC LOW BACK PAIN WITHOUT SCIATICA, UNSPECIFIED BACK PAIN LATERALITY: Primary | ICD-10-CM

## 2025-08-05 DIAGNOSIS — M70.60 GREATER TROCHANTERIC BURSITIS, UNSPECIFIED LATERALITY: ICD-10-CM

## 2025-08-05 PROCEDURE — 97112 NEUROMUSCULAR REEDUCATION: CPT | Performed by: PHYSICAL THERAPIST

## 2025-08-05 PROCEDURE — 97110 THERAPEUTIC EXERCISES: CPT | Performed by: PHYSICAL THERAPIST

## 2025-08-05 PROCEDURE — 97140 MANUAL THERAPY 1/> REGIONS: CPT | Performed by: PHYSICAL THERAPIST

## 2025-08-08 ENCOUNTER — OFFICE VISIT (OUTPATIENT)
Dept: PHYSICAL THERAPY | Facility: CLINIC | Age: 75
End: 2025-08-08
Attending: ORTHOPAEDIC SURGERY
Payer: MEDICARE

## 2025-08-08 DIAGNOSIS — G89.29 CHRONIC LOW BACK PAIN WITHOUT SCIATICA, UNSPECIFIED BACK PAIN LATERALITY: Primary | ICD-10-CM

## 2025-08-08 DIAGNOSIS — M53.3 SACROILIAC JOINT PAIN: ICD-10-CM

## 2025-08-08 DIAGNOSIS — R26.9 GAIT DIFFICULTY: ICD-10-CM

## 2025-08-08 DIAGNOSIS — M70.60 GREATER TROCHANTERIC BURSITIS, UNSPECIFIED LATERALITY: ICD-10-CM

## 2025-08-08 DIAGNOSIS — M54.50 CHRONIC LOW BACK PAIN WITHOUT SCIATICA, UNSPECIFIED BACK PAIN LATERALITY: Primary | ICD-10-CM

## 2025-08-08 PROCEDURE — 97140 MANUAL THERAPY 1/> REGIONS: CPT | Performed by: PHYSICAL THERAPIST

## 2025-08-08 PROCEDURE — 97110 THERAPEUTIC EXERCISES: CPT | Performed by: PHYSICAL THERAPIST

## 2025-08-12 ENCOUNTER — OFFICE VISIT (OUTPATIENT)
Dept: PHYSICAL THERAPY | Facility: CLINIC | Age: 75
End: 2025-08-12
Attending: ORTHOPAEDIC SURGERY
Payer: MEDICARE

## 2025-08-12 ENCOUNTER — OFFICE VISIT (OUTPATIENT)
Facility: CLINIC | Age: 75
End: 2025-08-12
Payer: MEDICARE

## 2025-08-13 ENCOUNTER — TELEPHONE (OUTPATIENT)
Facility: CLINIC | Age: 75
End: 2025-08-13

## 2025-08-19 ENCOUNTER — OFFICE VISIT (OUTPATIENT)
Facility: CLINIC | Age: 75
End: 2025-08-19
Payer: MEDICARE

## 2025-08-19 ENCOUNTER — OFFICE VISIT (OUTPATIENT)
Dept: PHYSICAL THERAPY | Facility: CLINIC | Age: 75
End: 2025-08-19
Attending: ORTHOPAEDIC SURGERY
Payer: MEDICARE

## 2025-08-19 VITALS
DIASTOLIC BLOOD PRESSURE: 72 MMHG | SYSTOLIC BLOOD PRESSURE: 163 MMHG | TEMPERATURE: 98.1 F | RESPIRATION RATE: 16 BRPM | HEART RATE: 85 BPM

## 2025-08-19 DIAGNOSIS — M53.3 SACROILIAC JOINT PAIN: ICD-10-CM

## 2025-08-19 DIAGNOSIS — R26.9 GAIT DIFFICULTY: ICD-10-CM

## 2025-08-19 DIAGNOSIS — M70.60 GREATER TROCHANTERIC BURSITIS, UNSPECIFIED LATERALITY: ICD-10-CM

## 2025-08-19 DIAGNOSIS — M54.50 CHRONIC LOW BACK PAIN WITHOUT SCIATICA, UNSPECIFIED BACK PAIN LATERALITY: Primary | ICD-10-CM

## 2025-08-19 DIAGNOSIS — G89.29 CHRONIC LOW BACK PAIN WITHOUT SCIATICA, UNSPECIFIED BACK PAIN LATERALITY: Primary | ICD-10-CM

## 2025-08-19 DIAGNOSIS — S81.801A OPEN WOUND OF RIGHT LOWER LEG, INITIAL ENCOUNTER: Primary | ICD-10-CM

## 2025-08-19 PROCEDURE — 11042 DBRDMT SUBQ TIS 1ST 20SQCM/<: CPT | Performed by: FAMILY MEDICINE

## 2025-08-19 PROCEDURE — 97110 THERAPEUTIC EXERCISES: CPT | Performed by: SPECIALIST/TECHNOLOGIST

## 2025-08-19 PROCEDURE — 97140 MANUAL THERAPY 1/> REGIONS: CPT | Performed by: SPECIALIST/TECHNOLOGIST

## 2025-08-19 RX ORDER — LIDOCAINE 40 MG/G
CREAM TOPICAL ONCE
Status: COMPLETED | OUTPATIENT
Start: 2025-08-19 | End: 2025-08-19

## 2025-08-19 RX ADMIN — LIDOCAINE: 40 CREAM TOPICAL at 15:54

## 2025-08-21 ENCOUNTER — OFFICE VISIT (OUTPATIENT)
Dept: PHYSICAL THERAPY | Facility: CLINIC | Age: 75
End: 2025-08-21
Attending: ORTHOPAEDIC SURGERY
Payer: MEDICARE

## 2025-08-21 DIAGNOSIS — R26.9 GAIT DIFFICULTY: ICD-10-CM

## 2025-08-21 DIAGNOSIS — G89.29 CHRONIC LOW BACK PAIN WITHOUT SCIATICA, UNSPECIFIED BACK PAIN LATERALITY: Primary | ICD-10-CM

## 2025-08-21 DIAGNOSIS — M53.3 SACROILIAC JOINT PAIN: ICD-10-CM

## 2025-08-21 DIAGNOSIS — M70.60 GREATER TROCHANTERIC BURSITIS, UNSPECIFIED LATERALITY: ICD-10-CM

## 2025-08-21 DIAGNOSIS — M54.50 CHRONIC LOW BACK PAIN WITHOUT SCIATICA, UNSPECIFIED BACK PAIN LATERALITY: Primary | ICD-10-CM

## 2025-08-21 PROCEDURE — 97112 NEUROMUSCULAR REEDUCATION: CPT

## 2025-08-21 PROCEDURE — 97140 MANUAL THERAPY 1/> REGIONS: CPT

## 2025-08-21 PROCEDURE — 97110 THERAPEUTIC EXERCISES: CPT

## (undated) DEVICE — DRAPE TOWEL: Brand: CONVERTORS

## (undated) DEVICE — NEEDLE 25G X 1 1/2

## (undated) DEVICE — SYRINGE 10ML LL

## (undated) DEVICE — TELFA ADHESIVE ISLAND DRESSING: Brand: TELFA

## (undated) DEVICE — Device

## (undated) DEVICE — NEEDLE SPINAL 22G X 5IN QUINCKE

## (undated) DEVICE — GLOVE SRG LF STRL BGL SKNSNS 7.5 PF

## (undated) DEVICE — ELECTRODE RF 15CM

## (undated) DEVICE — CHLORAPREP HI-LITE 10.5ML ORANGE

## (undated) DEVICE — NEEDLE 18 G X 1 1/2 SAFETY

## (undated) DEVICE — GAUZE SPONGES,USP TYPE VII GAUZE, 12 PLY: Brand: CURITY

## (undated) DEVICE — DRAPE SHEET THREE QUARTER

## (undated) DEVICE — GAUZE SPONGES,16 PLY: Brand: CURITY

## (undated) DEVICE — NEEDLE BLUNT 18 G X 1 1/2IN

## (undated) DEVICE — UTILITY MARKER,BLACK WITH LABELS: Brand: DEVON

## (undated) DEVICE — SYRINGE 5ML LL

## (undated) DEVICE — PAD GROUNDING IONIC RF DISP

## (undated) DEVICE — SKIN MARKER DUAL TIP WITH RULER CAP, FLEXIBLE RULER AND LABELS: Brand: DEVON

## (undated) DEVICE — NEEDLE SPINAL 22G X 3.5IN  QUINCKE